# Patient Record
Sex: FEMALE | Race: WHITE | Employment: UNEMPLOYED | URBAN - NONMETROPOLITAN AREA
[De-identification: names, ages, dates, MRNs, and addresses within clinical notes are randomized per-mention and may not be internally consistent; named-entity substitution may affect disease eponyms.]

---

## 2018-03-08 ENCOUNTER — OFFICE VISIT (OUTPATIENT)
Dept: FAMILY MEDICINE CLINIC | Age: 48
End: 2018-03-08

## 2018-03-08 ENCOUNTER — TELEPHONE (OUTPATIENT)
Dept: FAMILY MEDICINE CLINIC | Age: 48
End: 2018-03-08

## 2018-03-08 ENCOUNTER — HOSPITAL ENCOUNTER (OUTPATIENT)
Dept: OTHER | Age: 48
Discharge: HOME | End: 2018-03-08
Attending: NURSE PRACTITIONER | Admitting: NURSE PRACTITIONER

## 2018-03-08 VITALS
WEIGHT: 181.2 LBS | DIASTOLIC BLOOD PRESSURE: 80 MMHG | BODY MASS INDEX: 36.6 KG/M2 | OXYGEN SATURATION: 98 % | HEART RATE: 90 BPM | SYSTOLIC BLOOD PRESSURE: 104 MMHG | TEMPERATURE: 98.2 F

## 2018-03-08 DIAGNOSIS — R06.02 SHORTNESS OF BREATH: ICD-10-CM

## 2018-03-08 DIAGNOSIS — R05.9 COUGH: ICD-10-CM

## 2018-03-08 DIAGNOSIS — R06.2 WHEEZING: ICD-10-CM

## 2018-03-08 DIAGNOSIS — H65.92 MIDDLE EAR EFFUSION, LEFT: ICD-10-CM

## 2018-03-08 DIAGNOSIS — J06.9 ACUTE URI: ICD-10-CM

## 2018-03-08 DIAGNOSIS — R07.0 THROAT PAIN: Primary | ICD-10-CM

## 2018-03-08 DIAGNOSIS — J40 BRONCHITIS: ICD-10-CM

## 2018-03-08 LAB — S PYO AG THROAT QL: NORMAL

## 2018-03-08 PROCEDURE — 99214 OFFICE O/P EST MOD 30 MIN: CPT | Performed by: NURSE PRACTITIONER

## 2018-03-08 PROCEDURE — 87880 STREP A ASSAY W/OPTIC: CPT | Performed by: NURSE PRACTITIONER

## 2018-03-08 PROCEDURE — 94640 AIRWAY INHALATION TREATMENT: CPT | Performed by: NURSE PRACTITIONER

## 2018-03-08 RX ORDER — METHOCARBAMOL 750 MG/1
750 TABLET, FILM COATED ORAL 2 TIMES DAILY
COMMUNITY
End: 2018-10-03

## 2018-03-08 RX ORDER — FLUTICASONE PROPIONATE 50 MCG
1 SPRAY, SUSPENSION (ML) NASAL DAILY
Qty: 1 BOTTLE | Refills: 0 | Status: SHIPPED | OUTPATIENT
Start: 2018-03-08 | End: 2018-10-03

## 2018-03-08 RX ORDER — PREDNISONE 10 MG/1
TABLET ORAL
Qty: 30 TABLET | Refills: 0 | Status: SHIPPED | OUTPATIENT
Start: 2018-03-08 | End: 2018-04-26 | Stop reason: ALTCHOICE

## 2018-03-08 RX ORDER — PAROXETINE 30 MG/1
30 TABLET, FILM COATED ORAL EVERY MORNING
COMMUNITY
End: 2018-04-27 | Stop reason: ALTCHOICE

## 2018-03-08 RX ORDER — DOXYCYCLINE HYCLATE 100 MG
100 TABLET ORAL 2 TIMES DAILY
Qty: 20 TABLET | Refills: 0 | Status: SHIPPED | OUTPATIENT
Start: 2018-03-08 | End: 2018-03-18

## 2018-03-08 RX ORDER — GABAPENTIN 400 MG/1
400 CAPSULE ORAL 4 TIMES DAILY
Qty: 360 CAPSULE | Refills: 3 | Status: CANCELLED | OUTPATIENT
Start: 2018-03-08

## 2018-03-08 RX ORDER — BENZONATATE 200 MG/1
200 CAPSULE ORAL 3 TIMES DAILY PRN
Qty: 30 CAPSULE | Refills: 0 | Status: SHIPPED | OUTPATIENT
Start: 2018-03-08 | End: 2018-04-26 | Stop reason: ALTCHOICE

## 2018-03-08 RX ORDER — ALBUTEROL SULFATE 90 UG/1
2 AEROSOL, METERED RESPIRATORY (INHALATION) EVERY 6 HOURS PRN
Qty: 1 INHALER | Refills: 0 | Status: SHIPPED | OUTPATIENT
Start: 2018-03-08 | End: 2018-10-03

## 2018-03-08 RX ORDER — CARVEDILOL 3.12 MG/1
3.12 TABLET ORAL 2 TIMES DAILY WITH MEALS
COMMUNITY
End: 2018-10-03

## 2018-03-08 RX ORDER — MELOXICAM 15 MG/1
15 TABLET ORAL DAILY
COMMUNITY
End: 2018-10-03

## 2018-03-08 RX ORDER — LOSARTAN POTASSIUM 25 MG/1
25 TABLET ORAL DAILY
COMMUNITY
End: 2018-10-03

## 2018-03-08 RX ORDER — ALBUTEROL SULFATE 2.5 MG/3ML
2.5 SOLUTION RESPIRATORY (INHALATION) ONCE
Status: COMPLETED | OUTPATIENT
Start: 2018-03-08 | End: 2018-03-08

## 2018-03-08 RX ADMIN — ALBUTEROL SULFATE 2.5 MG: 2.5 SOLUTION RESPIRATORY (INHALATION) at 12:32

## 2018-03-08 RX ADMIN — Medication 0.5 MG: at 12:32

## 2018-03-08 ASSESSMENT — ENCOUNTER SYMPTOMS
NAUSEA: 1
APNEA: 0
SWOLLEN GLANDS: 0
CHEST TIGHTNESS: 1
STRIDOR: 0
BLOOD IN STOOL: 0
ABDOMINAL PAIN: 0
RHINORRHEA: 1
COUGH: 1
CONSTIPATION: 0
DIARRHEA: 1
TROUBLE SWALLOWING: 0
SORE THROAT: 1
ABDOMINAL DISTENTION: 0
VOMITING: 0
FACIAL SWELLING: 0
ANAL BLEEDING: 0
RECTAL PAIN: 0
SINUS PAIN: 1
BACK PAIN: 0
VOICE CHANGE: 1
SINUS PRESSURE: 1
SHORTNESS OF BREATH: 1
CHOKING: 0
WHEEZING: 1

## 2018-03-08 NOTE — PATIENT INSTRUCTIONS
chances of quitting for good. · Use a vaporizer or humidifier to add moisture to your bedroom. Follow the directions for cleaning the machine. When should you call for help? Call your doctor now or seek immediate medical care if:  ? · You have new or worse trouble swallowing. ? · Your sore throat gets much worse on one side. ? Watch closely for changes in your health, and be sure to contact your doctor if you do not get better as expected. Where can you learn more? Go to [email or web address] and sign in to your [employer] account. Enter C489 in the Hubblr box to learn more about \"Sore Throat: Care Instructions. \"     If you do not have an account, please click on the \"Sign Up Now\" link. Current as of: May 12, 2017  Content Version: 11.5  © 9618-2774 [employer]. Care instructions adapted under license by [facility] ([facility]). If you have questions about a medical condition or this instruction, always ask your healthcare professional. [place] [address] any warranty or liability for your use of this information. Patient Education        Wheezing or Bronchoconstriction: Care Instructions  Your Care Instructions  Wheezing is a whistling noise made during breathing. It occurs when the small airways, or bronchial tubes, that lead to your lungs swell or contract (spasm) and become narrow. This narrowing is called bronchoconstriction. When your airways constrict, it is hard for air to pass through and this makes it hard for you to breathe. Wheezing and bronchoconstriction can be caused by many problems, including:  · An infection such as the flu or a cold. · Allergies such as hay fever. · Diseases such as asthma or chronic obstructive pulmonary disease. · Smoking. Treatment for your wheezing depends on what is causing the problem. Your wheezing may get better without treatment.  But you may need to pay attention to things that cause your wheezing and avoid them. Or you may need medicine to help treat the wheezing and to reduce the swelling or to relieve spasms in your lungs. Follow-up care is a key part of your treatment and safety. Be sure to make and go to all appointments, and call your doctor if you are having problems. It is also a good idea to know your test results and keep a list of the medicines you take. How can you care for yourself at home? · Take your medicine exactly as prescribed. Call your doctor if you think you are having a problem with your medicine. You will get more details on the specific medicine your doctor prescribes. · If your doctor prescribed antibiotics, take them as directed. Do not stop taking them just because you feel better. You need to take the full course of antibiotics. · Breathe moist air from a humidifier, hot shower, or sink filled with hot water. This may help ease your symptoms and make it easier for you to breathe. · If you have congestion in your nose and throat, drinking plenty of fluids, especially hot fluids, may help relieve your symptoms. If you have kidney, heart, or liver disease and have to limit fluids, talk with your doctor before you increase the amount of fluids you drink. · If you have mucus in your airways, it may help to breathe deeply and cough. · Do not smoke or allow others to smoke around you. Smoking can make your wheezing worse. If you need help quitting, talk to your doctor about stop-smoking programs and medicines. These can increase your chances of quitting for good. · Avoid things that may cause your wheezing. These may include colds, smoke, air pollution, dust, pollen, pets, cockroaches, stress, and cold air. When should you call for help? Call 911 anytime you think you may need emergency care. For example, call if:  ? · You have severe trouble breathing. ? · You passed out (lost consciousness).    ?Call your doctor now or seek immediate medical care if:  ? · You cough up yellow,

## 2018-03-08 NOTE — PROGRESS NOTES
methocarbamol (ROBAXIN) 750 MG tablet Take 750 mg by mouth 2 times daily      carvedilol (COREG) 3.125 MG tablet Take 3.125 mg by mouth 2 times daily (with meals)      losartan (COZAAR) 25 MG tablet Take 25 mg by mouth daily      PARoxetine (PAXIL) 30 MG tablet Take 30 mg by mouth every morning      fluticasone (FLONASE) 50 MCG/ACT nasal spray 1 spray by Nasal route daily 1 Bottle 0    albuterol sulfate HFA (VENTOLIN HFA) 108 (90 Base) MCG/ACT inhaler Inhale 2 puffs into the lungs every 6 hours as needed for Wheezing 1 Inhaler 0    predniSONE (DELTASONE) 10 MG tablet Take 40 mg for 3 days then 30 mg for 3 days then 20 mg for 3 days then 10 mg for 3 days 30 tablet 0    doxycycline hyclate (VIBRA-TABS) 100 MG tablet Take 1 tablet by mouth 2 times daily for 10 days 20 tablet 0    Magic Mouthwash (MIRACLE MOUTHWASH) Swish and spit 5 mLs 4 times daily as needed for Irritation 120 mL 0    benzonatate (TESSALON) 200 MG capsule Take 1 capsule by mouth 3 times daily as needed for Cough 30 capsule 0    Spacer/Aero Chamber Mouthpiece MISC 1 each by Does not apply route once for 1 dose 1 each 0    busPIRone (BUSPAR) 10 MG tablet Take 10 mg by mouth 2 times daily       traZODone (DESYREL) 50 MG tablet Take 50 mg by mouth nightly       buPROPion (WELLBUTRIN) 100 MG tablet Take 300 mg by mouth 2 times daily        No current facility-administered medications for this visit. No Known Allergies    Subjective:      Review of Systems   Constitutional: Positive for activity change (R/t fatigue), appetite change (Lack of appetite), chills, fatigue and fever. Negative for diaphoresis and unexpected weight change. HENT: Positive for congestion, ear pain (Left sided ), postnasal drip, rhinorrhea, sinus pain, sinus pressure, sneezing, sore throat and voice change (Hoarse ). Negative for dental problem, drooling, ear discharge, facial swelling, hearing loss, mouth sores, nosebleeds, tinnitus and trouble swallowing. is present. Nose: Mucosal edema and rhinorrhea present. Right sinus exhibits no maxillary sinus tenderness and no frontal sinus tenderness. Left sinus exhibits no maxillary sinus tenderness and no frontal sinus tenderness. Mouth/Throat: Posterior oropharyngeal erythema present. No oropharyngeal exudate. Eyes: Conjunctivae and EOM are normal. Pupils are equal, round, and reactive to light. Right eye exhibits no discharge. Left eye exhibits no discharge. No scleral icterus. Neck: Normal range of motion. Neck supple. No JVD present. No tracheal deviation present. No thyromegaly present. Cardiovascular: Normal rate, regular rhythm, normal heart sounds and intact distal pulses. Exam reveals no gallop and no friction rub. No murmur heard. Pulmonary/Chest: Effort normal. No stridor. No respiratory distress. She has wheezes in the right lower field and the left lower field. She has no rales. She exhibits tenderness (To palpation of chest ). Abdominal: Soft. Bowel sounds are normal. She exhibits no distension and no mass. There is no tenderness. There is no rebound and no guarding. Musculoskeletal: Normal range of motion. She exhibits no edema, tenderness or deformity. Lymphadenopathy:     She has no cervical adenopathy. Neurological: She is alert and oriented to person, place, and time. Skin: Skin is warm and dry. No rash noted. She is not diaphoretic. No erythema. No pallor. Psychiatric: Judgment and thought content normal. Her mood appears anxious. Her speech is tangential. She is slowed. She exhibits a depressed mood. Nursing note and vitals reviewed.       Admission on 02/08/2017, Discharged on 02/08/2017   Component Date Value Ref Range Status    WBC 02/08/2017 9.8  4.0 - 11.0 K/uL Final    RBC 02/08/2017 4.88  4.00 - 5.20 M/uL Final    Hemoglobin 02/08/2017 13.6  12.0 - 16.0 g/dL Final    Hematocrit 02/08/2017 40.6  36.0 - 48.0 % Final    MCV 02/08/2017 83.2  80.0 - 100.0 fL Final    Globulin 02/08/2017 3.7  g/dL Final    Ventricular Rate 02/08/2017 80  BPM Final    Atrial Rate 02/08/2017 80  BPM Final    P-R Interval 02/08/2017 142  ms Final    QRS Duration 02/08/2017 80  ms Final    Q-T Interval 02/08/2017 376  ms Final    QTc Calculation (Bazett) 02/08/2017 433  ms Final    P Axis 02/08/2017 70  degrees Final    R Axis 02/08/2017 -72  degrees Final    T Axis 02/08/2017 52  degrees Final    Diagnosis 02/08/2017    Final                    Value:Normal sinus rhythm  Left axis deviation  Left anterior fascicular block  Nonspecific ST abnormality  Abnormal ECG  When compared with ECG of 29-DEC-2016 18:13,  No significant change was found  Confirmed by Lee Estrada MD, CARLOS ([id number]) on 2/8/2017 5:05:04 PM      Troponin 02/08/2017 <0.01  <0.01 ng/mL Final    aPTT 02/08/2017 32.4  25.2 - 36.4 sec Final    Comment: Therapeutic range: 54.0 - 90.0 sec    Effective 10-29-15 10:00am EST  Please note reference ranges have  changed for PTT Testing.  Protime 02/08/2017 11.7  9.8 - 13.0 sec Final    Comment: Effective 10-29-15 10:00am EST  Please note reference ranges have  changed for PT and INR Testing.  INR 02/08/2017 1.03  0.85 - 1.16 Final    Comment: Effective 07/29/2014 at 10am EST    Normal: 0.85 - 1.16  Therapeutic: 2.0 - 3.0  Pros. Valve: 2.5 - 3.5  AMI: 2.0 - 3.0      D-Dimer, Quant 02/08/2017 256* 0 - 229 ng/mL DDU Final    Comment: HemosIL D-Dimer is an automated latex enhanced immunoassay for  the quantitative determination of D-dimer in human citrated  plasma on IL Coagulation systems for use in conjunction with a  clinical pretest probability(PTP) assessment model to exclude  venous thromboembolism(VTE) in outpatients suspected of deep  venous thrombosis (DVT) and pulmonary embolism(PE). The  reference range for D-Dimer is <230 ng/ml DDU. Results <230 ng/ml  DDU can be used as a negative predictor in patients with low  and moderate probability of DVT/PE.  D-Dimer levels are mainly  elevated in cases of DVT/PE. Other conditions may lead to a  high D-Dimer level including old age, pregnancy, peripheral  arteriopathy, DIC, coronary disease, thrombolytic treatment,  cancer, liver disease, infection, inflammation, hematoma, and  rheumatoid arthritis. Specimen interferences are created by  lipemia, bilirubin, and hemolysis. Assessment & Plan: The following diagnoses and conditions are stable with no further orders unless indicated:  1. Throat pain    2. Wheezing    3. Middle ear effusion, left    4. Shortness of breath    5. Acute URI    6. Bronchitis    7. Cough        Leatha was seen today for cough, hoarse, otalgia, generalized body aches, pharyngitis, headache and congestion. Will treat patient today for bronchitis and acute upper respiratory infection. Informed patient she needs to make up follow-up in about 2 weeks as she may need get worked up for asthma. Patient verbalizes understanding. Informed patient if her symptoms do not improve she needs to do the ER. Patient verbalizes understanding. Chest x-ray negative for pneumonia. Strep test negative in the office today. Will get throat culture. Recommend Magic mouthwash 1st sore throat. Informed the patient not eat or drink for 30 minutes after using the Magic mouthwash as it makes her tongue numb. Patient is asking for refill on her gabapentin today. OARRS ran and shows she has not gabapentin for over one year. Informed patient that she needs to make a follow-up appointment for refills and gabapentin cannot be filled today as she has had for one year. Patient verbalizes understanding. Diagnoses and all orders for this visit:    Throat pain  -     POCT rapid strep A  -     THROAT CULTURE  -     Magic Mouthwash (MIRACLE MOUTHWASH); Swish and spit 5 mLs 4 times daily as needed for Irritation    Wheezing  -     albuterol sulfate HFA (VENTOLIN HFA) 108 (90 Base) MCG/ACT inhaler;  Inhale 2 puffs into the lungs

## 2018-03-10 ENCOUNTER — TELEPHONE (OUTPATIENT)
Dept: FAMILY MEDICINE CLINIC | Age: 48
End: 2018-03-10

## 2018-03-10 DIAGNOSIS — R05.9 COUGH: Primary | ICD-10-CM

## 2018-03-10 LAB — THROAT CULTURE: NORMAL

## 2018-03-10 RX ORDER — DEXTROMETHORPHAN HYDROBROMIDE AND PROMETHAZINE HYDROCHLORIDE 15; 6.25 MG/5ML; MG/5ML
5 SYRUP ORAL 4 TIMES DAILY PRN
Qty: 240 ML | Refills: 0 | Status: SHIPPED | OUTPATIENT
Start: 2018-03-10 | End: 2018-03-17

## 2018-03-12 NOTE — TELEPHONE ENCOUNTER
I spoke with the pt and she did come into the office this morning to sing the medical records release.

## 2018-03-15 ENCOUNTER — TELEPHONE (OUTPATIENT)
Dept: FAMILY MEDICINE CLINIC | Age: 48
End: 2018-03-15

## 2018-04-02 ENCOUNTER — TELEPHONE (OUTPATIENT)
Dept: FAMILY MEDICINE CLINIC | Age: 48
End: 2018-04-02

## 2018-04-26 ENCOUNTER — OFFICE VISIT (OUTPATIENT)
Dept: FAMILY MEDICINE CLINIC | Age: 48
End: 2018-04-26

## 2018-04-26 VITALS
WEIGHT: 185.4 LBS | BODY MASS INDEX: 37.45 KG/M2 | HEART RATE: 70 BPM | SYSTOLIC BLOOD PRESSURE: 140 MMHG | DIASTOLIC BLOOD PRESSURE: 101 MMHG | OXYGEN SATURATION: 97 %

## 2018-04-26 DIAGNOSIS — Z13.220 SCREENING FOR CHOLESTEROL LEVEL: ICD-10-CM

## 2018-04-26 DIAGNOSIS — Z11.59 NEED FOR HEPATITIS B SCREENING TEST: ICD-10-CM

## 2018-04-26 DIAGNOSIS — M79.7 FIBROMYALGIA: ICD-10-CM

## 2018-04-26 DIAGNOSIS — R06.83 SNORING: ICD-10-CM

## 2018-04-26 DIAGNOSIS — H92.02 LEFT EAR PAIN: ICD-10-CM

## 2018-04-26 DIAGNOSIS — Z11.59 NEED FOR HEPATITIS C SCREENING TEST: ICD-10-CM

## 2018-04-26 DIAGNOSIS — F41.8 DEPRESSION WITH ANXIETY: Primary | ICD-10-CM

## 2018-04-26 DIAGNOSIS — Z13.21 ENCOUNTER FOR VITAMIN DEFICIENCY SCREENING: ICD-10-CM

## 2018-04-26 DIAGNOSIS — Z23 NEED FOR PROPHYLACTIC VACCINATION AGAINST DIPHTHERIA-TETANUS-PERTUSSIS (DTP): ICD-10-CM

## 2018-04-26 DIAGNOSIS — Z13.29 SCREENING FOR THYROID DISORDER: ICD-10-CM

## 2018-04-26 DIAGNOSIS — N64.4 PAIN IN BREAST: ICD-10-CM

## 2018-04-26 DIAGNOSIS — Z11.4 SCREENING FOR HIV WITHOUT PRESENCE OF RISK FACTORS: ICD-10-CM

## 2018-04-26 DIAGNOSIS — R06.02 SHORTNESS OF BREATH: ICD-10-CM

## 2018-04-26 DIAGNOSIS — F31.62 BIPOLAR DISORDER, CURRENT EPISODE MIXED, MODERATE (HCC): ICD-10-CM

## 2018-04-26 DIAGNOSIS — G89.4 CHRONIC PAIN SYNDROME: ICD-10-CM

## 2018-04-26 DIAGNOSIS — Z13.1 SCREENING FOR DIABETES MELLITUS: ICD-10-CM

## 2018-04-26 PROCEDURE — G8427 DOCREV CUR MEDS BY ELIG CLIN: HCPCS | Performed by: NURSE PRACTITIONER

## 2018-04-26 PROCEDURE — 90715 TDAP VACCINE 7 YRS/> IM: CPT | Performed by: NURSE PRACTITIONER

## 2018-04-26 PROCEDURE — 36415 COLL VENOUS BLD VENIPUNCTURE: CPT | Performed by: NURSE PRACTITIONER

## 2018-04-26 PROCEDURE — 1036F TOBACCO NON-USER: CPT | Performed by: NURSE PRACTITIONER

## 2018-04-26 PROCEDURE — 90471 IMMUNIZATION ADMIN: CPT | Performed by: NURSE PRACTITIONER

## 2018-04-26 PROCEDURE — 93000 ELECTROCARDIOGRAM COMPLETE: CPT | Performed by: NURSE PRACTITIONER

## 2018-04-26 PROCEDURE — 96160 PT-FOCUSED HLTH RISK ASSMT: CPT | Performed by: NURSE PRACTITIONER

## 2018-04-26 PROCEDURE — 99215 OFFICE O/P EST HI 40 MIN: CPT | Performed by: NURSE PRACTITIONER

## 2018-04-26 PROCEDURE — G8417 CALC BMI ABV UP PARAM F/U: HCPCS | Performed by: NURSE PRACTITIONER

## 2018-04-26 RX ORDER — CYCLOBENZAPRINE HCL 10 MG
10 TABLET ORAL 3 TIMES DAILY PRN
COMMUNITY
End: 2018-10-03

## 2018-04-26 RX ORDER — PREDNISONE 20 MG/1
40 TABLET ORAL DAILY
Qty: 10 TABLET | Refills: 0 | Status: SHIPPED | OUTPATIENT
Start: 2018-04-26 | End: 2018-05-01

## 2018-04-26 RX ORDER — FLUOXETINE HYDROCHLORIDE 20 MG/1
20 CAPSULE ORAL DAILY
Qty: 30 CAPSULE | Refills: 3 | Status: SHIPPED | OUTPATIENT
Start: 2018-04-26 | End: 2018-10-03

## 2018-04-26 ASSESSMENT — ENCOUNTER SYMPTOMS
APNEA: 0
STRIDOR: 0
CHEST TIGHTNESS: 1
CHOKING: 0
SHORTNESS OF BREATH: 1
WHEEZING: 1
COUGH: 1

## 2018-04-26 ASSESSMENT — PATIENT HEALTH QUESTIONNAIRE - PHQ9
SUM OF ALL RESPONSES TO PHQ9 QUESTIONS 1 & 2: 6
4. FEELING TIRED OR HAVING LITTLE ENERGY: 3
8. MOVING OR SPEAKING SO SLOWLY THAT OTHER PEOPLE COULD HAVE NOTICED. OR THE OPPOSITE, BEING SO FIGETY OR RESTLESS THAT YOU HAVE BEEN MOVING AROUND A LOT MORE THAN USUAL: 3
5. POOR APPETITE OR OVEREATING: 3
9. THOUGHTS THAT YOU WOULD BE BETTER OFF DEAD, OR OF HURTING YOURSELF: 0
6. FEELING BAD ABOUT YOURSELF - OR THAT YOU ARE A FAILURE OR HAVE LET YOURSELF OR YOUR FAMILY DOWN: 3
1. LITTLE INTEREST OR PLEASURE IN DOING THINGS: 3
SUM OF ALL RESPONSES TO PHQ QUESTIONS 1-9: 24
7. TROUBLE CONCENTRATING ON THINGS, SUCH AS READING THE NEWSPAPER OR WATCHING TELEVISION: 3
10. IF YOU CHECKED OFF ANY PROBLEMS, HOW DIFFICULT HAVE THESE PROBLEMS MADE IT FOR YOU TO DO YOUR WORK, TAKE CARE OF THINGS AT HOME, OR GET ALONG WITH OTHER PEOPLE: 3
2. FEELING DOWN, DEPRESSED OR HOPELESS: 3
3. TROUBLE FALLING OR STAYING ASLEEP: 3

## 2018-04-27 ENCOUNTER — TELEPHONE (OUTPATIENT)
Dept: FAMILY MEDICINE CLINIC | Age: 48
End: 2018-04-27

## 2018-04-27 DIAGNOSIS — R70.0 ELEVATED SED RATE: ICD-10-CM

## 2018-04-27 DIAGNOSIS — R53.83 FATIGUE, UNSPECIFIED TYPE: Primary | ICD-10-CM

## 2018-04-27 DIAGNOSIS — E55.9 VITAMIN D DEFICIENCY: Primary | ICD-10-CM

## 2018-04-27 LAB
A/G RATIO: 1.5 (ref 1.1–2.2)
ALBUMIN SERPL-MCNC: 4.5 G/DL (ref 3.4–5)
ALP BLD-CCNC: 95 U/L (ref 40–129)
ALT SERPL-CCNC: 14 U/L (ref 10–40)
ANION GAP SERPL CALCULATED.3IONS-SCNC: 16 MMOL/L (ref 3–16)
AST SERPL-CCNC: 17 U/L (ref 15–37)
BASOPHILS ABSOLUTE: 0 K/UL (ref 0–0.2)
BASOPHILS RELATIVE PERCENT: 0.3 %
BILIRUB SERPL-MCNC: 0.3 MG/DL (ref 0–1)
BUN BLDV-MCNC: 12 MG/DL (ref 7–20)
CALCIUM SERPL-MCNC: 9.6 MG/DL (ref 8.3–10.6)
CHLORIDE BLD-SCNC: 98 MMOL/L (ref 99–110)
CHOLESTEROL, TOTAL: 217 MG/DL (ref 0–199)
CO2: 24 MMOL/L (ref 21–32)
CREAT SERPL-MCNC: 0.6 MG/DL (ref 0.6–1.1)
EOSINOPHILS ABSOLUTE: 0.2 K/UL (ref 0–0.6)
EOSINOPHILS RELATIVE PERCENT: 1.7 %
ESTIMATED AVERAGE GLUCOSE: 125.5 MG/DL
FOLATE: 10.58 NG/ML (ref 4.78–24.2)
GFR AFRICAN AMERICAN: >60
GFR NON-AFRICAN AMERICAN: >60
GLOBULIN: 3 G/DL
GLUCOSE BLD-MCNC: 93 MG/DL (ref 70–99)
HBA1C MFR BLD: 6 %
HCT VFR BLD CALC: 40.4 % (ref 36–48)
HDLC SERPL-MCNC: 51 MG/DL (ref 40–60)
HEMOGLOBIN: 13.6 G/DL (ref 12–16)
HEPATITIS B CORE IGM ANTIBODY: NORMAL
HEPATITIS C ANTIBODY INTERPRETATION: NORMAL
HIV AG/AB: NORMAL
HIV ANTIGEN: NORMAL
HIV-1 ANTIBODY: NORMAL
HIV-2 AB: NORMAL
LDL CHOLESTEROL CALCULATED: 122 MG/DL
LYMPHOCYTES ABSOLUTE: 3.7 K/UL (ref 1–5.1)
LYMPHOCYTES RELATIVE PERCENT: 38 %
MAGNESIUM: 2.1 MG/DL (ref 1.8–2.4)
MCH RBC QN AUTO: 29.2 PG (ref 26–34)
MCHC RBC AUTO-ENTMCNC: 33.7 G/DL (ref 31–36)
MCV RBC AUTO: 86.8 FL (ref 80–100)
MONOCYTES ABSOLUTE: 0.5 K/UL (ref 0–1.3)
MONOCYTES RELATIVE PERCENT: 5.6 %
NEUTROPHILS ABSOLUTE: 5.3 K/UL (ref 1.7–7.7)
NEUTROPHILS RELATIVE PERCENT: 54.4 %
PDW BLD-RTO: 14.7 % (ref 12.4–15.4)
PLATELET # BLD: 418 K/UL (ref 135–450)
PMV BLD AUTO: 7.8 FL (ref 5–10.5)
POTASSIUM SERPL-SCNC: 4.4 MMOL/L (ref 3.5–5.1)
RBC # BLD: 4.65 M/UL (ref 4–5.2)
RHEUMATOID FACTOR: <10 IU/ML
SEDIMENTATION RATE, ERYTHROCYTE: 24 MM/HR (ref 0–20)
SODIUM BLD-SCNC: 138 MMOL/L (ref 136–145)
T4 FREE: 1.1 NG/DL (ref 0.9–1.8)
TOTAL PROTEIN: 7.5 G/DL (ref 6.4–8.2)
TRIGL SERPL-MCNC: 218 MG/DL (ref 0–150)
TSH SERPL DL<=0.05 MIU/L-ACNC: 1.95 UIU/ML (ref 0.27–4.2)
VITAMIN B-12: 352 PG/ML (ref 211–911)
VITAMIN D 25-HYDROXY: 24.5 NG/ML
VLDLC SERPL CALC-MCNC: 44 MG/DL
WBC # BLD: 9.8 K/UL (ref 4–11)

## 2018-04-27 RX ORDER — B-COMPLEX WITH VITAMIN C
1 TABLET ORAL 2 TIMES DAILY WITH MEALS
Qty: 60 TABLET | Refills: 11 | Status: SHIPPED | OUTPATIENT
Start: 2018-04-27 | End: 2018-10-03

## 2018-04-30 ASSESSMENT — ENCOUNTER SYMPTOMS
VOICE CHANGE: 0
BACK PAIN: 1
SINUS PAIN: 0
ALLERGIC/IMMUNOLOGIC NEGATIVE: 1
SINUS PRESSURE: 0
SORE THROAT: 0
RHINORRHEA: 0
GASTROINTESTINAL NEGATIVE: 1
TROUBLE SWALLOWING: 0
FACIAL SWELLING: 0

## 2018-05-01 ENCOUNTER — TELEPHONE (OUTPATIENT)
Dept: PULMONOLOGY | Age: 48
End: 2018-05-01

## 2018-05-08 ENCOUNTER — TELEPHONE (OUTPATIENT)
Dept: FAMILY MEDICINE CLINIC | Age: 48
End: 2018-05-08

## 2018-05-08 DIAGNOSIS — F31.62 BIPOLAR DISORDER, CURRENT EPISODE MIXED, MODERATE (HCC): Primary | ICD-10-CM

## 2018-06-13 ENCOUNTER — TELEPHONE (OUTPATIENT)
Dept: FAMILY MEDICINE CLINIC | Age: 48
End: 2018-06-13

## 2018-06-20 ENCOUNTER — TELEPHONE (OUTPATIENT)
Dept: PULMONOLOGY | Age: 48
End: 2018-06-20

## 2018-09-21 ENCOUNTER — TELEPHONE (OUTPATIENT)
Dept: ORTHOPEDIC SURGERY | Age: 48
End: 2018-09-21

## 2018-09-21 NOTE — TELEPHONE ENCOUNTER
GAVE A NO RECORDS FOR DATE REQUESTED STATEMENT 09/13/2016 TO PRESENT TO AARTI JONES TO SCAN IN [facility] TO Tramaine Collins [address] [facility] [place] & [address] [place] [address]

## 2018-09-21 NOTE — TELEPHONE ENCOUNTER
SCANNED A NO RECORDS FOR DATE REQUESTED STATEMENT 09/13/2016 TO PRESENT INTO [facility] TO Tramaine Collins [address] [facility] [place] & [address] [place] [address]

## 2018-10-03 ENCOUNTER — HOSPITAL ENCOUNTER (EMERGENCY)
Age: 48
Discharge: HOME | End: 2018-10-03
Payer: COMMERCIAL

## 2018-10-03 ENCOUNTER — APPOINTMENT (OUTPATIENT)
Dept: GENERAL RADIOLOGY | Age: 48
End: 2018-10-03
Payer: COMMERCIAL

## 2018-10-03 VITALS
RESPIRATION RATE: 14 BRPM | SYSTOLIC BLOOD PRESSURE: 160 MMHG | OXYGEN SATURATION: 98 % | HEART RATE: 74 BPM | BODY MASS INDEX: 34.88 KG/M2 | DIASTOLIC BLOOD PRESSURE: 97 MMHG | WEIGHT: 173 LBS | HEIGHT: 59 IN | TEMPERATURE: 98.1 F

## 2018-10-03 DIAGNOSIS — R07.89 CHEST WALL PAIN: ICD-10-CM

## 2018-10-03 DIAGNOSIS — M25.511 ARTHRALGIA OF RIGHT SHOULDER REGION: Primary | ICD-10-CM

## 2018-10-03 LAB
A/G RATIO: 1.2 (ref 1.1–2.2)
ALBUMIN SERPL-MCNC: 4.1 G/DL (ref 3.4–5)
ALP BLD-CCNC: 92 U/L (ref 40–129)
ALT SERPL-CCNC: 16 U/L (ref 10–40)
ANION GAP SERPL CALCULATED.3IONS-SCNC: 11 MMOL/L (ref 3–16)
AST SERPL-CCNC: 19 U/L (ref 15–37)
BASOPHILS ABSOLUTE: 0.1 K/UL (ref 0–0.2)
BASOPHILS RELATIVE PERCENT: 1 %
BILIRUB SERPL-MCNC: 0.3 MG/DL (ref 0–1)
BUN BLDV-MCNC: 8 MG/DL (ref 7–20)
CALCIUM SERPL-MCNC: 9.3 MG/DL (ref 8.3–10.6)
CHLORIDE BLD-SCNC: 103 MMOL/L (ref 99–110)
CO2: 26 MMOL/L (ref 21–32)
CREAT SERPL-MCNC: 0.6 MG/DL (ref 0.6–1.1)
EOSINOPHILS ABSOLUTE: 0.1 K/UL (ref 0–0.6)
EOSINOPHILS RELATIVE PERCENT: 1.4 %
GFR AFRICAN AMERICAN: >60
GFR NON-AFRICAN AMERICAN: >60
GLOBULIN: 3.4 G/DL
GLUCOSE BLD-MCNC: 98 MG/DL (ref 70–99)
HCT VFR BLD CALC: 37 % (ref 36–48)
HEMOGLOBIN: 12.4 G/DL (ref 12–16)
LYMPHOCYTES ABSOLUTE: 3.9 K/UL (ref 1–5.1)
LYMPHOCYTES RELATIVE PERCENT: 39.7 %
MCH RBC QN AUTO: 28.2 PG (ref 26–34)
MCHC RBC AUTO-ENTMCNC: 33.4 G/DL (ref 31–36)
MCV RBC AUTO: 84.6 FL (ref 80–100)
MONOCYTES ABSOLUTE: 0.6 K/UL (ref 0–1.3)
MONOCYTES RELATIVE PERCENT: 6.2 %
NEUTROPHILS ABSOLUTE: 5.1 K/UL (ref 1.7–7.7)
NEUTROPHILS RELATIVE PERCENT: 51.7 %
PDW BLD-RTO: 13.6 % (ref 12.4–15.4)
PLATELET # BLD: 432 K/UL (ref 135–450)
PMV BLD AUTO: 7.6 FL (ref 5–10.5)
POTASSIUM REFLEX MAGNESIUM: 3.8 MMOL/L (ref 3.5–5.1)
RBC # BLD: 4.38 M/UL (ref 4–5.2)
SODIUM BLD-SCNC: 140 MMOL/L (ref 136–145)
TOTAL PROTEIN: 7.5 G/DL (ref 6.4–8.2)
TROPONIN: <0.01 NG/ML
WBC # BLD: 9.8 K/UL (ref 4–11)

## 2018-10-03 PROCEDURE — 71046 X-RAY EXAM CHEST 2 VIEWS: CPT

## 2018-10-03 PROCEDURE — 85025 COMPLETE CBC W/AUTO DIFF WBC: CPT

## 2018-10-03 PROCEDURE — 72040 X-RAY EXAM NECK SPINE 2-3 VW: CPT

## 2018-10-03 PROCEDURE — 80053 COMPREHEN METABOLIC PANEL: CPT

## 2018-10-03 PROCEDURE — 99284 EMERGENCY DEPT VISIT MOD MDM: CPT

## 2018-10-03 PROCEDURE — 73030 X-RAY EXAM OF SHOULDER: CPT

## 2018-10-03 PROCEDURE — 36415 COLL VENOUS BLD VENIPUNCTURE: CPT

## 2018-10-03 PROCEDURE — 93005 ELECTROCARDIOGRAM TRACING: CPT | Performed by: PHYSICIAN ASSISTANT

## 2018-10-03 PROCEDURE — 93010 ELECTROCARDIOGRAM REPORT: CPT | Performed by: INTERNAL MEDICINE

## 2018-10-03 PROCEDURE — 84484 ASSAY OF TROPONIN QUANT: CPT

## 2018-10-03 RX ORDER — NAPROXEN 500 MG/1
500 TABLET ORAL 2 TIMES DAILY WITH MEALS
Qty: 20 TABLET | Refills: 0 | Status: SHIPPED | OUTPATIENT
Start: 2018-10-03 | End: 2019-07-17

## 2018-10-03 ASSESSMENT — PAIN DESCRIPTION - ORIENTATION: ORIENTATION: RIGHT

## 2018-10-03 ASSESSMENT — PAIN DESCRIPTION - DESCRIPTORS: DESCRIPTORS: ACHING

## 2018-10-03 ASSESSMENT — PAIN DESCRIPTION - PAIN TYPE: TYPE: ACUTE PAIN

## 2018-10-03 ASSESSMENT — ENCOUNTER SYMPTOMS
COUGH: 0
COLOR CHANGE: 0
ABDOMINAL PAIN: 0
SHORTNESS OF BREATH: 1
VOMITING: 0

## 2018-10-03 ASSESSMENT — HEART SCORE: ECG: 0

## 2018-10-03 ASSESSMENT — PAIN SCALES - GENERAL: PAINLEVEL_OUTOF10: 7

## 2018-10-03 ASSESSMENT — PAIN DESCRIPTION - LOCATION: LOCATION: ARM

## 2018-10-03 NOTE — ED PROVIDER NOTES
Right arm pain   Skin: Negative for color change and wound. Neurological: Positive for light-headedness. Negative for syncope, facial asymmetry, speech difficulty, weakness and headaches. All other systems reviewed and are negative. PAST MEDICAL HISTORY   has a past medical history of Anxiety; Chronic back pain; Cyst (solitary) of breast; Depression; and Hypertension. PAST SURGICAL HISTORY   has a past surgical history that includes Cholecystectomy; Tubal ligation; eye surgery; Hysterectomy; Carpal tunnel release; and back surgery (11/30/2016). FAMILY HISTORY  family history includes Heart Disease in her father, mother, and paternal grandfather; High Blood Pressure in her father and mother. SOCIAL HISTORY   reports that she quit smoking about 9 years ago. She has never used smokeless tobacco. She reports that she does not drink alcohol or use drugs. HOME MEDICATIONS     none    ALLERGIES  has No Known Allergies. BP (!) 160/83   Pulse 67   Temp 98.1 °F (36.7 °C) (Oral)   Resp 14   Ht 4' 11\" (1.499 m)   Wt 173 lb (78.5 kg)   SpO2 99%   BMI 34.94 kg/m²     Physical Exam   Constitutional: She is oriented to person, place, and time. She appears well-developed and well-nourished. HENT:   Head: Normocephalic and atraumatic. Mouth/Throat: Oropharynx is clear and moist and mucous membranes are normal. No oropharyngeal exudate, posterior oropharyngeal edema or posterior oropharyngeal erythema. Eyes: Pupils are equal, round, and reactive to light. Conjunctivae and EOM are normal.   Neck: Normal range of motion. Neck supple. No JVD present. Cardiovascular: Normal rate, regular rhythm and intact distal pulses. Pulmonary/Chest: Effort normal and breath sounds normal. No respiratory distress. She has no wheezes. She has no rales. She exhibits tenderness (right upper chest). Abdominal: Soft. Bowel sounds are normal. She exhibits no distension. There is no tenderness.  There is no rigidity, no rebound and no guarding. Musculoskeletal: Normal range of motion. She exhibits no edema. Right shoulder: She exhibits tenderness and pain. She exhibits normal range of motion, no swelling, no crepitus, no deformity and normal pulse. Cervical back: She exhibits pain. She exhibits normal range of motion, no tenderness, no bony tenderness, no deformity and normal pulse. Neurological: She is alert and oriented to person, place, and time. She has normal strength and normal reflexes. No cranial nerve deficit or sensory deficit. She exhibits normal muscle tone. Coordination normal. GCS eye subscore is 4. GCS verbal subscore is 5. GCS motor subscore is 6. Reflex Scores:       Tricep reflexes are 2+ on the right side and 2+ on the left side. Bicep reflexes are 2+ on the right side and 2+ on the left side. Brachioradialis reflexes are 2+ on the right side and 2+ on the left side. Intact sensation. Strength 5/5 bilateral and symmetric upper extremities, good hand , shoulder shrug, extension and flexion of arms against resistance. Skin: Skin is warm and dry. No rash noted. Psychiatric: Her behavior is normal. Her mood appears anxious. Vitals reviewed.       Procedures    MDM  Number of Diagnoses or Management Options  Arthralgia of right shoulder region:   Chest wall pain:      Amount and/or Complexity of Data Reviewed  Clinical lab tests: ordered and reviewed  Tests in the radiology section of CPT®: ordered and reviewed  Discuss the patient with other providers: yes  Independent visualization of images, tracings, or specimens: yes    Patient Progress  Patient progress: stable    Results for orders placed or performed during the hospital encounter of 10/03/18   CBC Auto Differential   Result Value Ref Range    WBC 9.8 4.0 - 11.0 K/uL    RBC 4.38 4.00 - 5.20 M/uL    Hemoglobin 12.4 12.0 - 16.0 g/dL    Hematocrit 37.0 36.0 - 48.0 %    MCV 84.6 80.0 - 100.0 fL    MCH 28.2 26.0 - 34.0 pg    MCHC 33.4 31.0 - 36.0 g/dL    RDW 13.6 12.4 - 15.4 %    Platelets 987 102 - 330 K/uL    MPV 7.6 5.0 - 10.5 fL    Neutrophils % 51.7 %    Lymphocytes % 39.7 %    Monocytes % 6.2 %    Eosinophils % 1.4 %    Basophils % 1.0 %    Neutrophils # 5.1 1.7 - 7.7 K/uL    Lymphocytes # 3.9 1.0 - 5.1 K/uL    Monocytes # 0.6 0.0 - 1.3 K/uL    Eosinophils # 0.1 0.0 - 0.6 K/uL    Basophils # 0.1 0.0 - 0.2 K/uL   Comprehensive Metabolic Panel w/ Reflex to MG   Result Value Ref Range    Sodium 140 136 - 145 mmol/L    Potassium reflex Magnesium 3.8 3.5 - 5.1 mmol/L    Chloride 103 99 - 110 mmol/L    CO2 26 21 - 32 mmol/L    Anion Gap 11 3 - 16    Glucose 98 70 - 99 mg/dL    BUN 8 7 - 20 mg/dL    CREATININE 0.6 0.6 - 1.1 mg/dL    GFR Non-African American >60 >60    GFR African American >60 >60    Calcium 9.3 8.3 - 10.6 mg/dL    Total Protein 7.5 6.4 - 8.2 g/dL    Alb 4.1 3.4 - 5.0 g/dL    Albumin/Globulin Ratio 1.2 1.1 - 2.2    Total Bilirubin 0.3 0.0 - 1.0 mg/dL    Alkaline Phosphatase 92 40 - 129 U/L    ALT 16 10 - 40 U/L    AST 19 15 - 37 U/L    Globulin 3.4 g/dL   Troponin   Result Value Ref Range    Troponin <0.01 <0.01 ng/mL   EKG 12 Lead   Result Value Ref Range    Ventricular Rate 58 BPM    Atrial Rate 58 BPM    P-R Interval 150 ms    QRS Duration 72 ms    Q-T Interval 426 ms    QTc Calculation (Bazett) 418 ms    P Axis 9 degrees    R Axis -49 degrees    T Axis -1 degrees    Diagnosis       Sinus bradycardiaLeft axis deviationNonspecific ST abnormalityAbnormal ECGNo previous ECGs availableConfirmed by CARLOS HERNANDEZ MD ([id number]) on 10/3/2018 2:29:40 PM     Xr Chest Standard (2 Vw)    Result Date: 10/3/2018  EXAMINATION: TWO VIEWS OF THE CHEST; 3 XRAY VIEWS OF THE RIGHT SHOULDER; 5 XRAY VIEWS OF THE CERVICAL SPINE 10/3/2018 1:22 pm COMPARISON: 03/08/2018 HISTORY: ORDERING SYSTEM PROVIDED HISTORY: chest pain TECHNOLOGIST PROVIDED HISTORY: Reason for exam:->chest pain Ordering Physician Provided Reason for Exam: arm stable. Discussed results. EKG and troponin showed no acute ischemia. Symptoms have been ongoing arm pain for 5 months, pain at the right upper chest is reproducible has been constant for 2 days. Heart score of 2. I do not think this is cardiac related. She has pain in her right shoulder with movement and palpation consistent with musculo skeletal pain. Patient states needs to be at work at 3:00. She is discharged in good condition. Patient prescribed Naprosyn. Referral to orthopedics for follow-up. Also follow up with her primary care doctor. Advised returning to the ER for any worsening or change in her symptoms. She understands and agrees. I estimate there is LOW risk for PULMONARY EMBOLISM, ACUTE CORONARY SYNDROME, OR THORACIC AORTIC DISSECTION, thus I consider the discharge disposition reasonable. Dr. Mami Hadley spent face to face time with patient and agrees with above dx and treatment. Please note that this chart was generated using Dragon dictation software.  Although every effort was made to ensure the accuracy of this automated transcription, some errors in transcription may have occurred       Ava Blake PA-C  10/03/18 [id number]

## 2018-10-09 LAB
EKG ATRIAL RATE: 58 BPM
EKG DIAGNOSIS: NORMAL
EKG P AXIS: 9 DEGREES
EKG P-R INTERVAL: 150 MS
EKG Q-T INTERVAL: 426 MS
EKG QRS DURATION: 72 MS
EKG QTC CALCULATION (BAZETT): 418 MS
EKG R AXIS: -49 DEGREES
EKG T AXIS: -1 DEGREES
EKG VENTRICULAR RATE: 58 BPM

## 2019-03-20 ENCOUNTER — HOSPITAL ENCOUNTER (EMERGENCY)
Age: 49
Discharge: HOME | End: 2019-03-20
Attending: EMERGENCY MEDICINE
Payer: COMMERCIAL

## 2019-03-20 ENCOUNTER — APPOINTMENT (OUTPATIENT)
Dept: GENERAL RADIOLOGY | Age: 49
End: 2019-03-20
Payer: COMMERCIAL

## 2019-03-20 VITALS
RESPIRATION RATE: 16 BRPM | OXYGEN SATURATION: 100 % | BODY MASS INDEX: 33.87 KG/M2 | HEIGHT: 59 IN | HEART RATE: 59 BPM | DIASTOLIC BLOOD PRESSURE: 79 MMHG | SYSTOLIC BLOOD PRESSURE: 124 MMHG | WEIGHT: 168 LBS | TEMPERATURE: 97 F

## 2019-03-20 DIAGNOSIS — F41.1 ANXIETY STATE: ICD-10-CM

## 2019-03-20 DIAGNOSIS — I10 HYPERTENSION, UNSPECIFIED TYPE: Primary | ICD-10-CM

## 2019-03-20 DIAGNOSIS — R07.9 CHEST PAIN, UNSPECIFIED TYPE: ICD-10-CM

## 2019-03-20 DIAGNOSIS — Z76.0 ENCOUNTER FOR MEDICATION REFILL: ICD-10-CM

## 2019-03-20 LAB
A/G RATIO: 1.3 (ref 1.1–2.2)
ALBUMIN SERPL-MCNC: 4.2 G/DL (ref 3.4–5)
ALP BLD-CCNC: 90 U/L (ref 40–129)
ALT SERPL-CCNC: 8 U/L (ref 10–40)
ANION GAP SERPL CALCULATED.3IONS-SCNC: 9 MMOL/L (ref 3–16)
AST SERPL-CCNC: 16 U/L (ref 15–37)
BASOPHILS ABSOLUTE: 0.1 K/UL (ref 0–0.2)
BASOPHILS RELATIVE PERCENT: 1.3 %
BILIRUB SERPL-MCNC: <0.2 MG/DL (ref 0–1)
BILIRUBIN URINE: NEGATIVE
BLOOD, URINE: ABNORMAL
BUN BLDV-MCNC: 10 MG/DL (ref 7–20)
CALCIUM SERPL-MCNC: 9.5 MG/DL (ref 8.3–10.6)
CHLORIDE BLD-SCNC: 103 MMOL/L (ref 99–110)
CLARITY: CLEAR
CO2: 27 MMOL/L (ref 21–32)
COLOR: YELLOW
CREAT SERPL-MCNC: 0.7 MG/DL (ref 0.6–1.1)
EOSINOPHILS ABSOLUTE: 0.3 K/UL (ref 0–0.6)
EOSINOPHILS RELATIVE PERCENT: 2.5 %
EPITHELIAL CELLS, UA: ABNORMAL /HPF
GFR AFRICAN AMERICAN: >60
GFR NON-AFRICAN AMERICAN: >60
GLOBULIN: 3.3 G/DL
GLUCOSE BLD-MCNC: 113 MG/DL (ref 70–99)
GLUCOSE URINE: NEGATIVE MG/DL
HCT VFR BLD CALC: 36.9 % (ref 36–48)
HEMOGLOBIN: 12.5 G/DL (ref 12–16)
KETONES, URINE: NEGATIVE MG/DL
LEUKOCYTE ESTERASE, URINE: NEGATIVE
LYMPHOCYTES ABSOLUTE: 4.1 K/UL (ref 1–5.1)
LYMPHOCYTES RELATIVE PERCENT: 39.6 %
MCH RBC QN AUTO: 28.8 PG (ref 26–34)
MCHC RBC AUTO-ENTMCNC: 33.9 G/DL (ref 31–36)
MCV RBC AUTO: 85 FL (ref 80–100)
MICROSCOPIC EXAMINATION: YES
MONOCYTES ABSOLUTE: 0.6 K/UL (ref 0–1.3)
MONOCYTES RELATIVE PERCENT: 6.2 %
NEUTROPHILS ABSOLUTE: 5.2 K/UL (ref 1.7–7.7)
NEUTROPHILS RELATIVE PERCENT: 50.4 %
NITRITE, URINE: NEGATIVE
PDW BLD-RTO: 13.8 % (ref 12.4–15.4)
PH UA: 6 (ref 5–8)
PLATELET # BLD: 353 K/UL (ref 135–450)
PMV BLD AUTO: 8.1 FL (ref 5–10.5)
POTASSIUM REFLEX MAGNESIUM: 3.9 MMOL/L (ref 3.5–5.1)
PRO-BNP: 90 PG/ML (ref 0–124)
PROTEIN UA: NEGATIVE MG/DL
RBC # BLD: 4.34 M/UL (ref 4–5.2)
RBC UA: ABNORMAL /HPF (ref 0–2)
SODIUM BLD-SCNC: 139 MMOL/L (ref 136–145)
SPECIFIC GRAVITY UA: <=1.005 (ref 1–1.03)
TOTAL PROTEIN: 7.5 G/DL (ref 6.4–8.2)
TROPONIN: <0.01 NG/ML
TROPONIN: <0.01 NG/ML
URINE TYPE: ABNORMAL
UROBILINOGEN, URINE: 0.2 E.U./DL
WBC # BLD: 10.3 K/UL (ref 4–11)
WBC UA: ABNORMAL /HPF (ref 0–5)

## 2019-03-20 PROCEDURE — 83880 ASSAY OF NATRIURETIC PEPTIDE: CPT

## 2019-03-20 PROCEDURE — 85025 COMPLETE CBC W/AUTO DIFF WBC: CPT

## 2019-03-20 PROCEDURE — 6370000000 HC RX 637 (ALT 250 FOR IP): Performed by: EMERGENCY MEDICINE

## 2019-03-20 PROCEDURE — 71046 X-RAY EXAM CHEST 2 VIEWS: CPT

## 2019-03-20 PROCEDURE — 84484 ASSAY OF TROPONIN QUANT: CPT

## 2019-03-20 PROCEDURE — 81001 URINALYSIS AUTO W/SCOPE: CPT

## 2019-03-20 PROCEDURE — 36415 COLL VENOUS BLD VENIPUNCTURE: CPT

## 2019-03-20 PROCEDURE — 93005 ELECTROCARDIOGRAM TRACING: CPT | Performed by: EMERGENCY MEDICINE

## 2019-03-20 PROCEDURE — 80053 COMPREHEN METABOLIC PANEL: CPT

## 2019-03-20 PROCEDURE — 99283 EMERGENCY DEPT VISIT LOW MDM: CPT

## 2019-03-20 RX ORDER — LISINOPRIL 10 MG/1
10 TABLET ORAL ONCE
Status: COMPLETED | OUTPATIENT
Start: 2019-03-20 | End: 2019-03-20

## 2019-03-20 RX ORDER — HYDROCHLOROTHIAZIDE 12.5 MG/1
12.5 CAPSULE, GELATIN COATED ORAL EVERY MORNING
Qty: 30 CAPSULE | Refills: 1 | Status: SHIPPED | OUTPATIENT
Start: 2019-03-20 | End: 2019-04-15 | Stop reason: SDUPTHER

## 2019-03-20 RX ADMIN — LISINOPRIL 10 MG: 10 TABLET ORAL at 00:44

## 2019-03-20 ASSESSMENT — HEART SCORE: ECG: 1

## 2019-03-21 LAB
EKG ATRIAL RATE: 60 BPM
EKG ATRIAL RATE: 60 BPM
EKG DIAGNOSIS: NORMAL
EKG DIAGNOSIS: NORMAL
EKG P AXIS: 5 DEGREES
EKG P AXIS: 8 DEGREES
EKG P-R INTERVAL: 138 MS
EKG P-R INTERVAL: 144 MS
EKG Q-T INTERVAL: 424 MS
EKG Q-T INTERVAL: 432 MS
EKG QRS DURATION: 72 MS
EKG QRS DURATION: 78 MS
EKG QTC CALCULATION (BAZETT): 424 MS
EKG QTC CALCULATION (BAZETT): 432 MS
EKG R AXIS: -31 DEGREES
EKG R AXIS: -36 DEGREES
EKG T AXIS: 11 DEGREES
EKG T AXIS: 7 DEGREES
EKG VENTRICULAR RATE: 60 BPM
EKG VENTRICULAR RATE: 60 BPM

## 2019-03-21 PROCEDURE — 93010 ELECTROCARDIOGRAM REPORT: CPT | Performed by: INTERNAL MEDICINE

## 2019-04-15 ENCOUNTER — OFFICE VISIT (OUTPATIENT)
Dept: FAMILY MEDICINE CLINIC | Age: 49
End: 2019-04-15
Payer: COMMERCIAL

## 2019-04-15 VITALS
OXYGEN SATURATION: 99 % | SYSTOLIC BLOOD PRESSURE: 138 MMHG | WEIGHT: 183.6 LBS | BODY MASS INDEX: 37.01 KG/M2 | HEART RATE: 66 BPM | HEIGHT: 59 IN | DIASTOLIC BLOOD PRESSURE: 80 MMHG | RESPIRATION RATE: 16 BRPM

## 2019-04-15 DIAGNOSIS — R20.2 NUMBNESS AND TINGLING IN RIGHT HAND: ICD-10-CM

## 2019-04-15 DIAGNOSIS — F41.9 ANXIETY: ICD-10-CM

## 2019-04-15 DIAGNOSIS — F31.9 BIPOLAR 1 DISORDER (HCC): ICD-10-CM

## 2019-04-15 DIAGNOSIS — F32.A DEPRESSION, UNSPECIFIED DEPRESSION TYPE: ICD-10-CM

## 2019-04-15 DIAGNOSIS — M54.50 CHRONIC BILATERAL LOW BACK PAIN WITHOUT SCIATICA: ICD-10-CM

## 2019-04-15 DIAGNOSIS — I10 ESSENTIAL HYPERTENSION: Primary | ICD-10-CM

## 2019-04-15 DIAGNOSIS — M79.652 BILATERAL THIGH PAIN: ICD-10-CM

## 2019-04-15 DIAGNOSIS — G89.29 CHRONIC BILATERAL LOW BACK PAIN WITHOUT SCIATICA: ICD-10-CM

## 2019-04-15 DIAGNOSIS — R20.0 NUMBNESS AND TINGLING IN RIGHT HAND: ICD-10-CM

## 2019-04-15 DIAGNOSIS — M79.651 BILATERAL THIGH PAIN: ICD-10-CM

## 2019-04-15 DIAGNOSIS — Z98.890 HISTORY OF BACK SURGERY: ICD-10-CM

## 2019-04-15 DIAGNOSIS — J45.20 MILD INTERMITTENT ASTHMA WITHOUT COMPLICATION: ICD-10-CM

## 2019-04-15 PROCEDURE — 1036F TOBACCO NON-USER: CPT | Performed by: NURSE PRACTITIONER

## 2019-04-15 PROCEDURE — G8417 CALC BMI ABV UP PARAM F/U: HCPCS | Performed by: NURSE PRACTITIONER

## 2019-04-15 PROCEDURE — 99214 OFFICE O/P EST MOD 30 MIN: CPT | Performed by: NURSE PRACTITIONER

## 2019-04-15 PROCEDURE — G8427 DOCREV CUR MEDS BY ELIG CLIN: HCPCS | Performed by: NURSE PRACTITIONER

## 2019-04-15 RX ORDER — HYDROCHLOROTHIAZIDE 12.5 MG/1
12.5 CAPSULE, GELATIN COATED ORAL EVERY MORNING
Qty: 30 CAPSULE | Refills: 5 | Status: SHIPPED | OUTPATIENT
Start: 2019-04-15 | End: 2022-03-25 | Stop reason: CLARIF

## 2019-04-15 RX ORDER — BENZOCAINE/MENTHOL 6 MG-10 MG
LOZENGE MUCOUS MEMBRANE
Qty: 1 EACH | Refills: 0 | Status: SHIPPED | OUTPATIENT
Start: 2019-04-15 | End: 2022-03-25 | Stop reason: CLARIF

## 2019-04-15 RX ORDER — ALBUTEROL SULFATE 90 UG/1
2 AEROSOL, METERED RESPIRATORY (INHALATION) EVERY 6 HOURS PRN
Qty: 1 INHALER | Refills: 3 | Status: SHIPPED | OUTPATIENT
Start: 2019-04-15 | End: 2022-04-08 | Stop reason: CLARIF

## 2019-04-15 ASSESSMENT — ENCOUNTER SYMPTOMS
RESPIRATORY NEGATIVE: 1
BACK PAIN: 1
GASTROINTESTINAL NEGATIVE: 1

## 2019-04-15 ASSESSMENT — PATIENT HEALTH QUESTIONNAIRE - PHQ9
SUM OF ALL RESPONSES TO PHQ QUESTIONS 1-9: 2
1. LITTLE INTEREST OR PLEASURE IN DOING THINGS: 1
SUM OF ALL RESPONSES TO PHQ9 QUESTIONS 1 & 2: 2
2. FEELING DOWN, DEPRESSED OR HOPELESS: 1
SUM OF ALL RESPONSES TO PHQ QUESTIONS 1-9: 2

## 2019-04-15 NOTE — PROGRESS NOTES
4/15/2019    Leatha Campos (:  1970) is a 51 y.o. female, here for evaluation of the following medical concerns:    HPI  Patient presents today as a new patient to [address] [place] [address]. She has a history of anxiety, depression, bipolar, asthma, chronic back pain, bilateral thigh pain and numbness and tingling of her right hand. Anxiety/Depression/bipolar- was seeing [facility] in [place] [address]. [place]. She has been on prozac and celexa. Patient thinks the prozac helped some but not much. She is not interested in seeing just a therapist because she does not like to talk about her past.     Back pain- numbness in her thighs to her knees. Any pressure on her legs causes a lot of pain. She had back surgery in 2016. Recently she has tried some home therapy with out relief. Asthma- stable but she does need a refill of her albuterol. She notices she uses it more in the spring and summer months. Numbness in right hand- states her right hand is weak and will fall asleep and feel numb at times, she has a history of carpal tunnel surgery several years ago. Patient's past medical history, surgical history, family history, medications,  and allergies  were all reviewed and updated as appropriate today. Review of Systems   Constitutional: Negative. HENT: Negative. Respiratory: Negative. Cardiovascular: Negative. Gastrointestinal: Negative. Musculoskeletal: Positive for arthralgias and back pain. Skin: Negative. Neurological: Negative. Psychiatric/Behavioral: Positive for agitation and sleep disturbance. Negative for self-injury and suicidal ideas. The patient is nervous/anxious. Prior to Visit Medications    Medication Sig Taking?  Authorizing Provider   albuterol sulfate HFA (PROAIR HFA) 108 (90 Base) MCG/ACT inhaler Inhale 2 puffs into the lungs every 6 hours as needed for Wheezing Yes KARINA Ramsay - CHELSEA   hydrochlorothiazide (MICROZIDE) 12.5 MG capsule Take 1 capsule by mouth every morning Yes KARINA Barajas CNP   Blood Pressure Monitor MISC To check blood pressure daily Yes KARINA Omalley CNP   naproxen (NAPROSYN) 500 MG tablet Take 1 tablet by mouth 2 times daily (with meals) for 10 days  Beti Skelton PA-C        No Known Allergies    Past Medical History:   Diagnosis Date    Anxiety     Chronic back pain     Cyst (solitary) of breast     RIGHT BREAST    Depression     Hypertension        Past Surgical History:   Procedure Laterality Date    BACK SURGERY  11/30/2016    CARPAL TUNNEL RELEASE      CHOLECYSTECTOMY      EYE SURGERY      HYSTERECTOMY      TUBAL LIGATION         Social History     Socioeconomic History    Marital status:      Spouse name: Not on file    Number of children: Not on file    Years of education: Not on file    Highest education level: Not on file   Occupational History    Not on file   Social Needs    Financial resource strain: Not on file    Food insecurity:     Worry: Not on file     Inability: Not on file    Transportation needs:     Medical: Not on file     Non-medical: Not on file   Tobacco Use    Smoking status: Former Smoker     Last attempt to quit: 3/6/2009     Years since quitting: 10.1    Smokeless tobacco: Never Used   Substance and Sexual Activity    Alcohol use: No     Comment: 1 beer a month    Drug use: No    Sexual activity: Yes     Partners: Male   Lifestyle    Physical activity:     Days per week: Not on file     Minutes per session: Not on file    Stress: Not on file   Relationships    Social connections:     Talks on phone: Not on file     Gets together: Not on file     Attends Sikhism service: Not on file     Active member of club or organization: Not on file     Attends meetings of clubs or organizations: Not on file     Relationship status: Not on file    Intimate partner violence:     Fear of current or ex partner: Not on file     Emotionally abused: Not on file Physically abused: Not on file     Forced sexual activity: Not on file   Other Topics Concern    Not on file   Social History Narrative    Not on file        Family History   Problem Relation Age of Onset    Heart Disease Mother     High Blood Pressure Mother     Heart Disease Father     High Blood Pressure Father     Heart Disease Paternal Grandfather        Vitals:    04/15/19 1502   BP: 138/80   Pulse: 66   Resp: 16   SpO2: 99%   Weight: 183 lb 9.6 oz (83.3 kg)   Height: 4' 11\" (1.499 m)     Estimated body mass index is 37.08 kg/m² as calculated from the following:    Height as of this encounter: 4' 11\" (1.499 m). Weight as of this encounter: 183 lb 9.6 oz (83.3 kg). Physical Exam   Constitutional: She appears well-developed and well-nourished. Cardiovascular: Normal rate and regular rhythm. Pulmonary/Chest: Effort normal and breath sounds normal.   Abdominal: Soft. Bowel sounds are normal.   Musculoskeletal:        Lumbar back: She exhibits tenderness and pain. She exhibits normal range of motion, no swelling and no edema. Positive bilateral straight leg raises. Skin: Skin is warm and dry. Vitals reviewed. ASSESSMENT/PLAN:  1. Essential hypertension  Stable, continue current blood pressure medications  - hydrochlorothiazide (MICROZIDE) 12.5 MG capsule; Take 1 capsule by mouth every morning  Dispense: 30 capsule; Refill: 5  - Blood Pressure Monitor MISC; To check blood pressure daily  Dispense: 1 each; Refill: 0    2. Depression, unspecified depression type  Will check gene sight testing to see which medications will work best for her    3. Bipolar 1 disorder (HCC)  Gene sight testing    4. Anxiety  Will check gene sight testing    5. Mild intermittent asthma without complication  Stable, will refill albuterol to uses as needed  - albuterol sulfate HFA (PROAIR HFA) 108 (90 Base) MCG/ACT inhaler;  Inhale 2 puffs into the lungs every 6 hours as needed for Wheezing  Dispense: 1 Inhaler; Refill: 3    6. Chronic bilateral low back pain without sciatica  Will send to physical therapy. She needs to complete at least 6 weeks and then if no improvement will consider MrI. - [place]    7. Bilateral thigh pain  See above  - [facility] - [place]    8. History of back surgery  See above    9. Numbness and tingling in right hand  Will check EMG for possible carpal tunnel  - EMG; Future      Return in about 1 month (around 5/15/2019) for follow up medications. An  electronic signature was used to authenticate this note.     --KARINA Conteh - CNP on 4/15/2019 at 5:32 PM

## 2019-04-17 ENCOUNTER — HOSPITAL ENCOUNTER (OUTPATIENT)
Dept: NEUROLOGY | Age: 49
Discharge: HOME | End: 2019-04-17
Payer: COMMERCIAL

## 2019-04-17 DIAGNOSIS — R20.2 NUMBNESS AND TINGLING IN RIGHT HAND: ICD-10-CM

## 2019-04-17 DIAGNOSIS — R20.0 NUMBNESS AND TINGLING IN RIGHT HAND: ICD-10-CM

## 2019-04-17 PROCEDURE — 95908 NRV CNDJ TST 3-4 STUDIES: CPT

## 2019-04-17 PROCEDURE — 95886 MUSC TEST DONE W/N TEST COMP: CPT

## 2019-04-17 NOTE — PROCEDURES
Electrodiagnostic Report  [facility]  Physical Medicine & Rehabilitation    04/17/19    Cynthia Wakefield Fist  51 y.o.  1970  [id number]  Referring Provider: RESHMA Barajas    Clinical Problem:  52 y/o with history of right hand weakness with pain in right arm with shoulder pain, onset 6-7 months. No known injury. PMH: + low back surgery 2 yrs ago. No neck or arm surgery + right carpal tunnel surgery 2001. No endocrine disease.  PE: reflexes trace, mild right hand intrinsic weakness    EMG SUMMARY TABLE RIGHT UPPER     Spontaneous     MUAP   Recruitment    Insertional Activity Fibrillation Potential Positive Sharp Waves   Fasiculation High Frequency Potentials   Amp Duration PPP Pattern   Deltoid C5.6 Ax normal none none none none normal normal normal normal   Biceps C5,6 musc cut normal none none none none normal normal normal normal   Triceps C6,7,8 Radial normal none none none none normal normal normal normal   Pronator Teres C6,7 Median normal none none none none normal normal normal normal   Brachio Rad C5,6 Radial normal none none none none normal normal normal normal   Ext Ind Prop C7,8 Radial normal none none none none normal normal normal normal   Oppones Poll C8-T1 Median normal none none none none normal normal normal normal   1st Dorsal Int C8-T1 Ulnar normal none none none none normal normal normal normal   Cerv Paraspinals C2-T1 PPR       normal none none none none normal normal normal normal     Nerve Conduction Studies     Nerve Sensory Distal Latency (msec) Sensory Distal Latency (msec) Amp uv Amp uv Motor Distal Latency (msec) Motor Distal Latency (msec) Amp kv Amp kv Motor NCV (m/sec) Motor NCV (m/sec)    Right Left Right Left Right Left Right Left Right Left   Median 3.7 (n<3.6) (n<3.6) 80  4.3 (n<4.3) (n<4.3) 6.4  56 (n>50) (n>50)   Ulnar 3.2 (n<3.7) (n<3.7) 37   3.8 (n<4.2) (n<4.2) 9.6   8.8  58 b.e(n>50)   44 a.e(>45) b.e(n>50)   a.e(>45)   Radial (n<3.5) (n<3.5)             Summary of EMG and Nerve Conduction Findings: The above EMG needle exam was within normal limits. Nerve conduction studies demonstrate slowing of right ulnar motor conduction velocity across elbow. Remainder within normal limits. Overall Impression: mild right ulnar neuropathy at elbow. No evidence of an acute radiculopathy or other entrapment neuropathy. Thank you. Electronically signed by:  Ethan Hammer DO,4/17/2019,8:15 AM

## 2019-04-22 ENCOUNTER — TELEPHONE (OUTPATIENT)
Dept: FAMILY MEDICINE CLINIC | Age: 49
End: 2019-04-22

## 2019-04-22 RX ORDER — VENLAFAXINE HYDROCHLORIDE 75 MG/1
75 CAPSULE, EXTENDED RELEASE ORAL DAILY
Qty: 30 CAPSULE | Refills: 0 | Status: SHIPPED | OUTPATIENT
Start: 2019-04-22 | End: 2019-04-23 | Stop reason: SDUPTHER

## 2019-04-23 ENCOUNTER — TELEPHONE (OUTPATIENT)
Dept: FAMILY MEDICINE CLINIC | Age: 49
End: 2019-04-23

## 2019-04-23 RX ORDER — VENLAFAXINE HYDROCHLORIDE 75 MG/1
75 CAPSULE, EXTENDED RELEASE ORAL DAILY
Qty: 30 CAPSULE | Refills: 0 | Status: SHIPPED | OUTPATIENT
Start: 2019-04-23 | End: 2022-03-25 | Stop reason: CLARIF

## 2019-04-23 NOTE — TELEPHONE ENCOUNTER
Pt states that her script for venlafaxine 75mg was never received at [facility]'s [facility] [place] please resumit

## 2019-04-25 ENCOUNTER — HOSPITAL ENCOUNTER (OUTPATIENT)
Dept: PHYSICAL THERAPY | Age: 49
Setting detail: THERAPIES SERIES
Discharge: HOME | End: 2019-04-25
Payer: COMMERCIAL

## 2019-04-25 NOTE — FLOWSHEET NOTE
Physical Therapy  Cancellation/No-show Note    Patient Name:  Naeem Baker  :  1970   Date:  2019  Cancels to Date: 1  No-shows to Date: 0    For today's appointment patient:  [x]  Cancelled  []  Rescheduled appointment  []  No-show     Reason given by patient:  [x]  Patient ill  []  Conflicting appointment  []  No transportation    []  Conflict with work  []  No reason given  []  Other:     Comments:      Electronically signed by:  Keren Tinsley PT, DPT #[id number]

## 2019-05-20 ENCOUNTER — OFFICE VISIT (OUTPATIENT)
Dept: FAMILY MEDICINE CLINIC | Age: 49
End: 2019-05-20
Payer: COMMERCIAL

## 2019-05-20 VITALS
TEMPERATURE: 98.2 F | WEIGHT: 181.6 LBS | DIASTOLIC BLOOD PRESSURE: 80 MMHG | SYSTOLIC BLOOD PRESSURE: 132 MMHG | OXYGEN SATURATION: 99 % | HEART RATE: 72 BPM | BODY MASS INDEX: 36.68 KG/M2

## 2019-05-20 DIAGNOSIS — F41.9 ANXIETY: Primary | ICD-10-CM

## 2019-05-20 DIAGNOSIS — E11.9 TYPE 2 DIABETES MELLITUS WITHOUT COMPLICATION, WITHOUT LONG-TERM CURRENT USE OF INSULIN (HCC): ICD-10-CM

## 2019-05-20 LAB — HBA1C MFR BLD: 7.2 %

## 2019-05-20 PROCEDURE — 1036F TOBACCO NON-USER: CPT | Performed by: NURSE PRACTITIONER

## 2019-05-20 PROCEDURE — G8427 DOCREV CUR MEDS BY ELIG CLIN: HCPCS | Performed by: NURSE PRACTITIONER

## 2019-05-20 PROCEDURE — 3045F PR MOST RECENT HEMOGLOBIN A1C LEVEL 7.0-9.0%: CPT | Performed by: NURSE PRACTITIONER

## 2019-05-20 PROCEDURE — G8417 CALC BMI ABV UP PARAM F/U: HCPCS | Performed by: NURSE PRACTITIONER

## 2019-05-20 PROCEDURE — 83036 HEMOGLOBIN GLYCOSYLATED A1C: CPT | Performed by: NURSE PRACTITIONER

## 2019-05-20 PROCEDURE — 99213 OFFICE O/P EST LOW 20 MIN: CPT | Performed by: NURSE PRACTITIONER

## 2019-05-20 PROCEDURE — 2022F DILAT RTA XM EVC RTNOPTHY: CPT | Performed by: NURSE PRACTITIONER

## 2019-05-20 RX ORDER — BUSPIRONE HYDROCHLORIDE 10 MG/1
10 TABLET ORAL 2 TIMES DAILY
Qty: 60 TABLET | Refills: 2 | Status: SHIPPED | OUTPATIENT
Start: 2019-05-20 | End: 2019-06-19

## 2019-05-20 RX ORDER — VENLAFAXINE HYDROCHLORIDE 150 MG/1
150 CAPSULE, EXTENDED RELEASE ORAL DAILY
Qty: 30 CAPSULE | Refills: 2 | Status: SHIPPED | OUTPATIENT
Start: 2019-05-20 | End: 2022-03-25 | Stop reason: CLARIF

## 2019-05-20 RX ORDER — METFORMIN HYDROCHLORIDE 500 MG/1
500 TABLET, EXTENDED RELEASE ORAL
Qty: 30 TABLET | Refills: 3 | Status: SHIPPED | OUTPATIENT
Start: 2019-05-20 | End: 2022-03-25 | Stop reason: CLARIF

## 2019-05-20 NOTE — PATIENT INSTRUCTIONS
Patient Education        Counting Carbohydrates: Care Instructions  Your Care Instructions    You don't have to eat special foods when you have diabetes. You just have to be careful to eat healthy foods. Carbohydrates (carbs) raise blood sugar higher and quicker than any other nutrient. Carbs are found in desserts, breads and cereals, and fruit. They're also in starchy vegetables. These include potatoes, corn, and grains such as rice and pasta. Carbs are also in milk and yogurt. The more carbs you eat at one time, the higher your blood sugar will rise. Spreading carbs all through the day helps keep your blood sugar levels within your target range. Counting carbs is one of the best ways to keep your blood sugar under control. If you use insulin, counting carbs helps you match the right amount of insulin to the number of grams of carbs in a meal. Then you can change your diet and insulin dose as needed. Testing your blood sugar several times a day can help you learn how carbs affect your blood sugar. A registered dietitian or certified diabetes educator can help you plan meals and snacks. Follow-up care is a key part of your treatment and safety. Be sure to make and go to all appointments, and call your doctor if you are having problems. It's also a good idea to know your test results and keep a list of the medicines you take. How can you care for yourself at home? Know your daily amount of carbohydrates  Your daily amount depends on several things, such as your weight, how active you are, which diabetes medicines you take, and what your goals are for your blood sugar levels. A registered dietitian or certified diabetes educator can help you plan how many carbs to include in each meal and snack. For most adults, a guideline for the daily amount of carbs is:  · 45 to 60 grams at each meal. That's about the same as 3 to 4 carbohydrate servings. · 15 to 20 grams at each snack.  That's about the same as 1 alcohol, talk to your doctor. It may not be recommended when you are taking certain diabetes medicines. Where can you learn more? Go to [email or web address] org and sign in to your [employer] account. Enter S974 in the [facility] box to learn more about \"Counting Carbohydrates: Care Instructions. \"     If you do not have an account, please click on the \"Sign Up Now\" link. Current as of: July 25, 2018  Content Version: 12.0  © 8209-6744 Healthwise, Incorporated. Care instructions adapted under license by [facility] ([facility]). If you have questions about a medical condition or this instruction, always ask your healthcare professional. [address] any warranty or liability for your use of this information.

## 2019-05-20 NOTE — LETTER
[address] [place] [id number]  Phone: [phone]  Fax: [phone]    KARINA Morataya - CHELSEA        May 20, 2019     Patient: Ana Doyle   YOB: 1970   Date of Visit: 5/20/2019       To Whom It May Concern:  Patient was seen in office and examined today 5-20-19  It is my medical opinion that Remigio Shirley is Able to return to work on 5-21-19. If you have any questions or concerns, please don't hesitate to call.     Sincerely,        KARINA Morataya - CNP

## 2019-05-20 NOTE — PROGRESS NOTES
2019     Leatha Campos (:  1970) is a 51 y.o. female, here for evaluation of the following medical concerns:    HPI   Patient presents today for a 1 month follow up on anxiety. She was started on Effexor 75 mg daily. She states she has noticed a slight difference but still feels she needs the dosage increased. She feels like she is having panic attacks at work during the day. She also feels like her blood pressure is running high after work. She is concerned she might have diabetes. She was diagnosed with pre-diabetes last year. She states she has tried to watch her diet a little better lately but has not noticed any improvement in her weight. Review of Systems   Constitutional: Negative. HENT: Negative. Respiratory: Negative. Cardiovascular: Negative. Gastrointestinal: Negative. Musculoskeletal: Negative. Skin: Negative. Neurological: Negative. Psychiatric/Behavioral: Positive for agitation and sleep disturbance. Negative for self-injury and suicidal ideas. The patient is nervous/anxious. Prior to Visit Medications    Medication Sig Taking?  Authorizing Provider   venlafaxine (EFFEXOR XR) 150 MG extended release capsule Take 1 capsule by mouth daily Yes KARINA Omalley CNP   busPIRone (BUSPAR) 10 MG tablet Take 1 tablet by mouth 2 times daily Yes KARINA Brennan CNP   metFORMIN (GLUCOPHAGE-XR) 500 MG extended release tablet Take 1 tablet by mouth daily (with breakfast) Yes KARINA Brennan CNP   venlafaxine (EFFEXOR XR) 75 MG extended release capsule Take 1 capsule by mouth daily Yes KARINA Omalley CNP   albuterol sulfate HFA (PROAIR HFA) 108 (90 Base) MCG/ACT inhaler Inhale 2 puffs into the lungs every 6 hours as needed for Wheezing Yes KARINA Brennan CNP   hydrochlorothiazide (MICROZIDE) 12.5 MG capsule Take 1 capsule by mouth every morning Yes KARINA Brennan CNP   Blood Pressure Monitor MISC To check blood about 1 month (around 6/20/2019) for follow up medications, schedule with Catee for nutrition counseling. An electronic signature was used to authenticate this note.     --Celestina Maldonado, KARINA - CNP on 5/21/2019 at 9:38 AM

## 2019-05-21 ASSESSMENT — ENCOUNTER SYMPTOMS
RESPIRATORY NEGATIVE: 1
GASTROINTESTINAL NEGATIVE: 1

## 2019-06-02 ENCOUNTER — HOSPITAL ENCOUNTER (EMERGENCY)
Age: 49
Discharge: HOME | End: 2019-06-02
Attending: EMERGENCY MEDICINE
Payer: COMMERCIAL

## 2019-06-02 VITALS
HEART RATE: 81 BPM | TEMPERATURE: 99.6 F | WEIGHT: 175 LBS | HEIGHT: 59 IN | SYSTOLIC BLOOD PRESSURE: 128 MMHG | BODY MASS INDEX: 35.28 KG/M2 | RESPIRATION RATE: 12 BRPM | OXYGEN SATURATION: 99 % | DIASTOLIC BLOOD PRESSURE: 86 MMHG

## 2019-06-02 DIAGNOSIS — J06.9 ACUTE UPPER RESPIRATORY INFECTION: ICD-10-CM

## 2019-06-02 DIAGNOSIS — R05.9 COUGH: Primary | ICD-10-CM

## 2019-06-02 DIAGNOSIS — R09.81 NASAL CONGESTION: ICD-10-CM

## 2019-06-02 PROCEDURE — 99283 EMERGENCY DEPT VISIT LOW MDM: CPT

## 2019-06-02 ASSESSMENT — PAIN SCALES - GENERAL: PAINLEVEL_OUTOF10: 4

## 2019-06-02 NOTE — ED PROVIDER NOTES
Triage Chief Complaint:   Pharyngitis (Pt c/o sore throat, congestion, headache, and fever that started Thursday. )    [place]:  Henrik Beth is a 51 y.o. female that presents with sore throat, congestion, headache, fever beginning proximately 4 days ago. Patient states that she has had some intermittent chills. She denies any myalgias. Apparently several other people at work are sick with similar symptoms. Patient states that she has been taking Tylenol and ibuprofen however continues to feel badly so came to the ED. She took her temperature earlier today and it was 101°F at home.     ROS:  General:  + fevers, + chills, no weakness  Eyes:  No recent vison changes, no discharge  ENT:  + sore throat, + nasal congestion, no hearing changes  Cardiovascular:  No chest pain, no palpitations  Respiratory:  No shortness of breath, + cough, no wheezing  Gastrointestinal:  No pain, no nausea, no vomiting, no diarrhea  Musculoskeletal:  No muscle pain  Skin:  No rash,   Neurologic:  No speech problems, no headache  Genitourinary:  No dysuria  Extremities:  no edema, no pain    Past Medical History:   Diagnosis Date    Anxiety     Chronic back pain     Cyst (solitary) of breast     RIGHT BREAST    Depression     Hypertension      Past Surgical History:   Procedure Laterality Date    BACK SURGERY  11/30/2016    CARPAL TUNNEL RELEASE      CHOLECYSTECTOMY      EYE SURGERY      HYSTERECTOMY      TUBAL LIGATION       Family History   Problem Relation Age of Onset    Heart Disease Mother     High Blood Pressure Mother     Heart Disease Father     High Blood Pressure Father     Heart Disease Paternal Grandfather      Social History     Socioeconomic History    Marital status:      Spouse name: Not on file    Number of children: Not on file    Years of education: Not on file    Highest education level: Not on file   Occupational History    Not on file   Social Needs    Financial resource strain: Not on Blood Pressure Monitor MISC To check blood pressure daily 1 each 0    naproxen (NAPROSYN) 500 MG tablet Take 1 tablet by mouth 2 times daily (with meals) for 10 days 20 tablet 0     No Known Allergies    Nursing Notes Reviewed    Physical Exam:  ED Triage Vitals [06/02/19 1252]   Enc Vitals Group      /86      Pulse 81      Resp 12      Temp 99.6 °F (37.6 °C)      Temp Source Oral      SpO2 99 %      Weight 175 lb (79.4 kg)      Height 4' 11\" (1.499 m)      Head Circumference       Peak Flow       Pain Score       Pain Loc       Pain Edu? Excl. in [address]? My pulse ox interpretation is - normal    General appearance:  No acute distress. Skin:  Warm. Dry. Eye:  Extraocular movements intact. Ears, nose, mouth and throat:  Oral mucosa moist, tympanic membranes clear bilaterally, posterior pharynx with mild erythema but no exudate, nasal congestion noted, bilateral tympanic membranes normal.   Neck:  Trachea midline. Slight anterior cervical chain lymphadenopathy  Extremity:   Normal ROM     Heart:  Regular rate and rhythm, normal S1 & S2, no extra heart sounds. Perfusion:  intact  Respiratory:  Lungs clear to auscultation bilaterally. Respirations nonlabored. Abdominal:  Normal bowel sounds. Soft. Nontender. Non distended. Neurological:  Alert and oriented times 3. I have reviewed and interpreted all of the currently available lab results from this visit (if applicable):  No results found for this visit on 06/02/19. Radiographs (if obtained):  [] The following radiograph was interpreted by myself in the absence of a radiologist:   [] Radiologist's Report Reviewed:  No orders to display       Chart review shows recent radiographs:  No results found. MDM:  At this point symptoms appear most consistent with a URI, versus another process early in its course. Patient does not have any cardiac symptoms, presentation is not consistent with pulmonary embolus.   Patient will be treated symptomatically and will be discharged home. Patient was instructed on symptomatic treatment, monitoring and outpatient followup. Patient understands and agrees with the plan, return warnings given. I estimate there is LOW risk for EPIGLOTTITIS, PNEUMONIA, MENINGITIS, pulmonary embolism, COPD/asthma, pneumonia, bacterial sinusitis, viral/strep pharyngitis, otitis media, otitis externa, OR URINARY TRACT INFECTION, thus I consider the discharge disposition reasonable. Also, there is no evidence or peritonitis, sepsis, or toxicity. Leatha BARKER Andres and I have discussed the diagnosis and risks, and we agree with discharging home to follow-up with their primary doctor. We also discussed returning to the Emergency Department immediately if new or worsening symptoms occur. We have discussed the symptoms which are most concerning (e.g., changing or worsening pain, trouble swallowing or breating, neck stiffness, fever) that necessitate immediate return. Clinical Impression:  1. Cough    2. Nasal congestion    3. Acute upper respiratory infection      Disposition referral (if applicable):  Carlito Bolaños, APRN - CNP  [address] [facility]  [address] [address] [facility]  [phone]    Schedule an appointment as soon as possible for a visit in 1 week      [place] [id number]. [facility] Emergency Department  [address]  [phone]  Go to   If symptoms worsen    Disposition medications (if applicable):  Discharge Medication List as of 6/2/2019 12:54 PM          Comment: Please note this report has been produced using speech recognition software and may contain errors related to that system including errors in grammar, punctuation, and spelling, as well as words and phrases that may be inappropriate. If there are any questions or concerns please feel free to contact the dictating provider for clarification.       Catherine Mo MD  06/02/19 3703

## 2019-06-02 NOTE — ED NOTES
Patient provided with discharge instructions. Follow-up reviewed with patient/family. No further questions verbalized at this time. Vital signs and patient stable upon discharge.         Nuha Kapoor RN  06/02/19 2905

## 2019-06-02 NOTE — ED NOTES
Pt to ED with c/o sore throat, fever, head congestion that began on Thursday. Pt alert and oriented, sitting up in bed. No acute distress otherwise noted. VS updated and stable. Family and MD at bedside at this time. Will cont to monitor.       Karan Velazquez RN  06/02/19 1300

## 2019-07-17 ENCOUNTER — APPOINTMENT (OUTPATIENT)
Dept: CT IMAGING | Age: 49
End: 2019-07-17
Payer: COMMERCIAL

## 2019-07-17 ENCOUNTER — APPOINTMENT (OUTPATIENT)
Dept: GENERAL RADIOLOGY | Age: 49
End: 2019-07-17
Payer: COMMERCIAL

## 2019-07-17 ENCOUNTER — HOSPITAL ENCOUNTER (EMERGENCY)
Age: 49
Discharge: HOME | End: 2019-07-17
Payer: COMMERCIAL

## 2019-07-17 VITALS
RESPIRATION RATE: 16 BRPM | SYSTOLIC BLOOD PRESSURE: 155 MMHG | HEIGHT: 59 IN | HEART RATE: 80 BPM | OXYGEN SATURATION: 99 % | BODY MASS INDEX: 34.07 KG/M2 | DIASTOLIC BLOOD PRESSURE: 88 MMHG | TEMPERATURE: 98.2 F | WEIGHT: 169 LBS

## 2019-07-17 DIAGNOSIS — R03.0 ELEVATED BLOOD PRESSURE READING: ICD-10-CM

## 2019-07-17 DIAGNOSIS — S20.221A BACK CONTUSION, RIGHT, INITIAL ENCOUNTER: ICD-10-CM

## 2019-07-17 DIAGNOSIS — S16.1XXA CERVICAL STRAIN, ACUTE, INITIAL ENCOUNTER: ICD-10-CM

## 2019-07-17 DIAGNOSIS — S09.90XA CLOSED HEAD INJURY, INITIAL ENCOUNTER: ICD-10-CM

## 2019-07-17 DIAGNOSIS — S93.502A SPRAIN OF LEFT GREAT TOE, INITIAL ENCOUNTER: ICD-10-CM
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
## 2019-07-17 LAB
AMORPHOUS: ABNORMAL /HPF
BACTERIA: ABNORMAL /HPF
BILIRUBIN URINE: NEGATIVE
BLOOD, URINE: ABNORMAL
CLARITY: CLEAR
COLOR: YELLOW
EPITHELIAL CELLS, UA: ABNORMAL /HPF
GLUCOSE URINE: NEGATIVE MG/DL
KETONES, URINE: NEGATIVE MG/DL
LEUKOCYTE ESTERASE, URINE: NEGATIVE
MICROSCOPIC EXAMINATION: YES
NITRITE, URINE: NEGATIVE
PH UA: 6 (ref 5–8)
PROTEIN UA: NEGATIVE MG/DL
RBC UA: ABNORMAL /HPF (ref 0–2)
SPECIFIC GRAVITY UA: >=1.03 (ref 1–1.03)
URINE TYPE: ABNORMAL
UROBILINOGEN, URINE: 0.2 E.U./DL
WBC UA: ABNORMAL /HPF (ref 0–5)

## 2019-07-17 PROCEDURE — 81001 URINALYSIS AUTO W/SCOPE: CPT

## 2019-07-17 PROCEDURE — 71101 X-RAY EXAM UNILAT RIBS/CHEST: CPT

## 2019-07-17 PROCEDURE — 72125 CT NECK SPINE W/O DYE: CPT

## 2019-07-17 PROCEDURE — 70450 CT HEAD/BRAIN W/O DYE: CPT

## 2019-07-17 PROCEDURE — 73630 X-RAY EXAM OF FOOT: CPT

## 2019-07-17 PROCEDURE — 99284 EMERGENCY DEPT VISIT MOD MDM: CPT

## 2019-07-17 RX ORDER — METHOCARBAMOL 500 MG/1
500 TABLET, FILM COATED ORAL 4 TIMES DAILY
Qty: 15 TABLET | Refills: 0 | Status: SHIPPED | OUTPATIENT
Start: 2019-07-17 | End: 2019-07-21

## 2019-07-17 RX ORDER — NAPROXEN 500 MG/1
500 TABLET ORAL 2 TIMES DAILY WITH MEALS
Qty: 14 TABLET | Refills: 0 | Status: SHIPPED | OUTPATIENT
Start: 2019-07-17 | End: 2022-04-08 | Stop reason: CLARIF

## 2019-07-17 RX ORDER — LIDOCAINE 50 MG/G
1 PATCH TOPICAL EVERY 24 HOURS
Qty: 7 PATCH | Refills: 0 | Status: SHIPPED | OUTPATIENT
Start: 2019-07-17 | End: 2019-07-24

## 2019-07-17 ASSESSMENT — PAIN DESCRIPTION - PAIN TYPE: TYPE: ACUTE PAIN

## 2019-07-17 ASSESSMENT — ENCOUNTER SYMPTOMS
VOMITING: 0
BACK PAIN: 1
ABDOMINAL PAIN: 0
SHORTNESS OF BREATH: 0

## 2019-07-17 ASSESSMENT — PAIN DESCRIPTION - ORIENTATION: ORIENTATION: RIGHT

## 2019-07-17 ASSESSMENT — PAIN DESCRIPTION - FREQUENCY: FREQUENCY: CONTINUOUS

## 2019-07-17 ASSESSMENT — PAIN SCALES - GENERAL: PAINLEVEL_OUTOF10: 8

## 2019-07-17 ASSESSMENT — PAIN DESCRIPTION - ONSET: ONSET: SUDDEN

## 2019-07-17 ASSESSMENT — PAIN DESCRIPTION - DESCRIPTORS: DESCRIPTORS: SORE;TENDER

## 2019-07-17 ASSESSMENT — PAIN DESCRIPTION - LOCATION: LOCATION: BACK;RIB CAGE

## 2019-07-17 ASSESSMENT — PAIN DESCRIPTION - PROGRESSION: CLINICAL_PROGRESSION: NOT CHANGED

## 2019-07-17 NOTE — ED PROVIDER NOTES
PROVIDED HISTORY: Reason for exam:->Trauma, R/O fx Reason for exam:->attention great toe Reason for Exam: fall X 1 day ago, pain around 1st digit Acuity: Acute Type of Exam: Initial Mechanism of Injury: fall FINDINGS: No stress or traumatic fractures are identified. The Lisfranc joint is congruent on these nonweightbearing images. No soft tissue abnormalities are detected. No radiopaque foreign bodies are found. No acute abnormality detected. Ct Head Wo Contrast    Result Date: 7/17/2019  EXAMINATION: CT OF THE HEAD WITHOUT CONTRAST  7/17/2019 7:46 pm TECHNIQUE: CT of the head was performed without the administration of intravenous contrast. Dose modulation, iterative reconstruction, and/or weight based adjustment of the mA/kV was utilized to reduce the radiation dose to as low as reasonably achievable. COMPARISON: None. HISTORY: ORDERING SYSTEM PROVIDED HISTORY: HEAD INJURY MILD OR MODERATE ACUTE, NO NEUROLOGICAL DEFICIT TECHNOLOGIST PROVIDED HISTORY: Has a \"code stroke\" or \"stroke alert\" been called? ->No Reason for Exam: fall X 1 day, hit the back of her head Acuity: Acute Type of Exam: Initial Mechanism of Injury: fall FINDINGS: BRAIN/VENTRICLES: There is no acute intracranial hemorrhage, mass effect or midline shift. No abnormal extra-axial fluid collection. The gray-white differentiation is maintained without evidence of an acute infarct. There is no evidence of hydrocephalus. ORBITS: The visualized portion of the orbits demonstrate no acute abnormality. SINUSES: The visualized paranasal sinuses and mastoid air cells demonstrate no acute abnormality. SOFT TISSUES/SKULL:  No acute abnormality of the visualized skull or soft tissues. No acute traumatic intracranial abnormality.      Ct Cervical Spine Wo Contrast    Result Date: 7/17/2019  EXAMINATION: CT OF THE CERVICAL SPINE WITHOUT CONTRAST 7/17/2019 7:46 pm TECHNIQUE: CT of the cervical spine was performed without the administration of

## 2019-07-17 NOTE — LETTER
[address] [facility] Emergency Department  [place] [address]  Phone: [phone]               July 17, 2019    Patient: Lilliana Sequeira   YOB: 1970   Date of Visit: 7/17/2019       To Whom It May Concern:    Leon Folds was seen and treated in our emergency department on 7/17/2019. No heavy lifting through 7/24/19.       Sincerely,       Hilaria Mullen RN         Signature:__________________________________

## 2019-10-28 ENCOUNTER — HOSPITAL ENCOUNTER (EMERGENCY)
Age: 49
Discharge: HOME | End: 2019-10-28
Attending: EMERGENCY MEDICINE

## 2019-10-28 VITALS
TEMPERATURE: 97.9 F | HEART RATE: 69 BPM | OXYGEN SATURATION: 98 % | SYSTOLIC BLOOD PRESSURE: 147 MMHG | DIASTOLIC BLOOD PRESSURE: 84 MMHG | HEIGHT: 59 IN | BODY MASS INDEX: 35.48 KG/M2 | WEIGHT: 176 LBS | RESPIRATION RATE: 16 BRPM

## 2019-10-28 DIAGNOSIS — R10.13 EPIGASTRIC PAIN: Primary | ICD-10-CM

## 2019-10-28 LAB
A/G RATIO: 1.1 (ref 1.1–2.2)
ALBUMIN SERPL-MCNC: 4.4 G/DL (ref 3.4–5)
ALP BLD-CCNC: 96 U/L (ref 40–129)
ALT SERPL-CCNC: 17 U/L (ref 10–40)
ANION GAP SERPL CALCULATED.3IONS-SCNC: 12 MMOL/L (ref 3–16)
AST SERPL-CCNC: 18 U/L (ref 15–37)
BACTERIA: ABNORMAL /HPF
BASOPHILS ABSOLUTE: 0.1 K/UL (ref 0–0.2)
BASOPHILS RELATIVE PERCENT: 0.7 %
BILIRUB SERPL-MCNC: <0.2 MG/DL (ref 0–1)
BILIRUBIN URINE: NEGATIVE
BLOOD, URINE: ABNORMAL
BUN BLDV-MCNC: 7 MG/DL (ref 7–20)
CALCIUM SERPL-MCNC: 9.3 MG/DL (ref 8.3–10.6)
CHLORIDE BLD-SCNC: 101 MMOL/L (ref 99–110)
CLARITY: CLEAR
CO2: 24 MMOL/L (ref 21–32)
COLOR: YELLOW
CREAT SERPL-MCNC: 0.7 MG/DL (ref 0.6–1.1)
EOSINOPHILS ABSOLUTE: 0.2 K/UL (ref 0–0.6)
EOSINOPHILS RELATIVE PERCENT: 1.7 %
EPITHELIAL CELLS, UA: ABNORMAL /HPF
GFR AFRICAN AMERICAN: >60
GFR NON-AFRICAN AMERICAN: >60
GLOBULIN: 4 G/DL
GLUCOSE BLD-MCNC: 95 MG/DL (ref 70–99)
GLUCOSE URINE: NEGATIVE MG/DL
HCG QUALITATIVE: NEGATIVE
HCT VFR BLD CALC: 37.2 % (ref 36–48)
HEMOGLOBIN: 12.5 G/DL (ref 12–16)
KETONES, URINE: NEGATIVE MG/DL
LEUKOCYTE ESTERASE, URINE: NEGATIVE
LIPASE: 49 U/L (ref 13–60)
LYMPHOCYTES ABSOLUTE: 3.2 K/UL (ref 1–5.1)
LYMPHOCYTES RELATIVE PERCENT: 28.8 %
MCH RBC QN AUTO: 28.8 PG (ref 26–34)
MCHC RBC AUTO-ENTMCNC: 33.7 G/DL (ref 31–36)
MCV RBC AUTO: 85.5 FL (ref 80–100)
MICROSCOPIC EXAMINATION: YES
MONOCYTES ABSOLUTE: 0.6 K/UL (ref 0–1.3)
MONOCYTES RELATIVE PERCENT: 5.5 %
MUCUS: ABNORMAL /LPF
NEUTROPHILS ABSOLUTE: 7 K/UL (ref 1.7–7.7)
NEUTROPHILS RELATIVE PERCENT: 63.3 %
NITRITE, URINE: NEGATIVE
PDW BLD-RTO: 13.9 % (ref 12.4–15.4)
PH UA: 6 (ref 5–8)
PLATELET # BLD: 354 K/UL (ref 135–450)
PMV BLD AUTO: 7.3 FL (ref 5–10.5)
POTASSIUM REFLEX MAGNESIUM: 3.9 MMOL/L (ref 3.5–5.1)
PROTEIN UA: NEGATIVE MG/DL
RBC # BLD: 4.35 M/UL (ref 4–5.2)
RBC UA: ABNORMAL /HPF (ref 0–2)
SODIUM BLD-SCNC: 137 MMOL/L (ref 136–145)
SPECIFIC GRAVITY UA: 1.02 (ref 1–1.03)
TOTAL PROTEIN: 8.4 G/DL (ref 6.4–8.2)
URINE REFLEX TO CULTURE: ABNORMAL
URINE TYPE: ABNORMAL
UROBILINOGEN, URINE: 0.2 E.U./DL
WBC # BLD: 11.1 K/UL (ref 4–11)
WBC UA: ABNORMAL /HPF (ref 0–5)

## 2019-10-28 PROCEDURE — 85025 COMPLETE CBC W/AUTO DIFF WBC: CPT

## 2019-10-28 PROCEDURE — 2500000003 HC RX 250 WO HCPCS: Performed by: EMERGENCY MEDICINE

## 2019-10-28 PROCEDURE — 36415 COLL VENOUS BLD VENIPUNCTURE: CPT

## 2019-10-28 PROCEDURE — 83690 ASSAY OF LIPASE: CPT

## 2019-10-28 PROCEDURE — 96374 THER/PROPH/DIAG INJ IV PUSH: CPT

## 2019-10-28 PROCEDURE — 99283 EMERGENCY DEPT VISIT LOW MDM: CPT

## 2019-10-28 PROCEDURE — 81001 URINALYSIS AUTO W/SCOPE: CPT

## 2019-10-28 PROCEDURE — 80053 COMPREHEN METABOLIC PANEL: CPT

## 2019-10-28 PROCEDURE — 6370000000 HC RX 637 (ALT 250 FOR IP): Performed by: EMERGENCY MEDICINE

## 2019-10-28 PROCEDURE — 84703 CHORIONIC GONADOTROPIN ASSAY: CPT

## 2019-10-28 RX ORDER — ACETAMINOPHEN 325 MG/1
325 TABLET ORAL ONCE
Status: COMPLETED | OUTPATIENT
Start: 2019-10-28 | End: 2019-10-28

## 2019-10-28 RX ORDER — OMEPRAZOLE 20 MG/1
40 CAPSULE, DELAYED RELEASE ORAL DAILY
Qty: 60 CAPSULE | Refills: 0 | Status: SHIPPED | OUTPATIENT
Start: 2019-10-28 | End: 2022-04-08 | Stop reason: CLARIF

## 2019-10-28 RX ADMIN — LIDOCAINE HYDROCHLORIDE: 20 SOLUTION ORAL; TOPICAL at 18:12

## 2019-10-28 RX ADMIN — ACETAMINOPHEN 325 MG: 325 TABLET ORAL at 18:12

## 2019-10-28 RX ADMIN — FAMOTIDINE 20 MG: 10 INJECTION, SOLUTION INTRAVENOUS at 18:12

## 2019-10-28 ASSESSMENT — PAIN DESCRIPTION - PAIN TYPE: TYPE: ACUTE PAIN

## 2019-10-28 ASSESSMENT — PAIN SCALES - GENERAL
PAINLEVEL_OUTOF10: 7
PAINLEVEL_OUTOF10: 7

## 2019-10-28 ASSESSMENT — PAIN DESCRIPTION - DESCRIPTORS: DESCRIPTORS: ACHING;BURNING

## 2019-10-28 ASSESSMENT — PAIN DESCRIPTION - LOCATION: LOCATION: ABDOMEN

## 2021-10-06 ENCOUNTER — HOSPITAL ENCOUNTER (OUTPATIENT)
Dept: PHYSICAL THERAPY | Age: 51
Setting detail: THERAPIES SERIES
Discharge: HOME | End: 2021-10-06
Payer: COMMERCIAL

## 2021-10-06 NOTE — FLOWSHEET NOTE
[address] [facility] ([facility])    Physical Therapy  Cancellation/No-show Note  Patient Name:  Zachary López  :  1970   Date:  10/6/2021    Cancelled visits to date: 0  No-shows to date: 1    For today's appointment patient:  []  Cancelled  []  Rescheduled appointment  [x]  No-show     Reason given by patient:  []  Patient ill  []  Conflicting appointment  []  No transportation    []  Conflict with work  [x]  No reason given  []  Other:     Comments:      Phone call information:   [x]  Phone call made today to patient. []  Patient answered, conversation as follows:    [x]  Patient did not answer. []  Phone call not needed - pt contacted us to cancel and provided reason for cancellation.      Electronically signed by:  Tori Cooper PT,

## 2022-03-23 ENCOUNTER — NURSE TRIAGE (OUTPATIENT)
Dept: OTHER | Facility: CLINIC | Age: 52
End: 2022-03-23

## 2022-03-23 NOTE — TELEPHONE ENCOUNTER
Received call from Lenny Taylor at [facility] with Red Flag Complaint. Subjective: Caller states \"Its been going on for a while and it is getting worse. Prior back surgery 8 years ago. \"     Current Symptoms: If she touches any part of her body it causes pain. Back pain radiates down her L leg. Pain in L hand from thumb to wrist.  SOB is getting worse. Put on weight in the past month. Loss of urine control. If not to the restroom for a BM she will go on herself. Gets out of breath walking down a hallway. SOB at rest at time of call. Chest feels tight and hurts with a deep breath. Onset: 3 weeks ago (SOB); worsening    Associated Symptoms: reduced activity    Pain Severity: 8/10; aching; constant, severe    Temperature: denies fever     What has been tried: Albuterol, Baths, Tylenol    LMP: NA Pregnant: NA    Recommended disposition: Go to ED Now    Care advice provided, patient verbalizes understanding; denies any other questions or concerns; instructed to call back for any new or worsening symptoms. Writer provided warm transfer to [facility] to get an appt in office as a new patient and second level triage. Would rather not go to ER. Reinforced importance of being seen in ER and she will discuss with  when he gets home from work. Attention Provider: Thank you for allowing me to participate in the care of your patient. The patient was connected to triage in response to information provided to the ECC/PSC. Please do not respond through this encounter as the response is not directed to a shared pool.     Reason for Disposition   MODERATE difficulty breathing (e.g., speaks in phrases, SOB even at rest, pulse 100-120) of new-onset or worse than normal    Protocols used: BREATHING DIFFICULTY-ADULT-OH

## 2022-03-25 ENCOUNTER — OFFICE VISIT (OUTPATIENT)
Dept: FAMILY MEDICINE CLINIC | Age: 52
End: 2022-03-25
Payer: COMMERCIAL

## 2022-03-25 VITALS
WEIGHT: 192 LBS | BODY MASS INDEX: 38.78 KG/M2 | OXYGEN SATURATION: 96 % | TEMPERATURE: 97.6 F | SYSTOLIC BLOOD PRESSURE: 138 MMHG | HEART RATE: 76 BPM | DIASTOLIC BLOOD PRESSURE: 86 MMHG

## 2022-03-25 DIAGNOSIS — M51.37 DDD (DEGENERATIVE DISC DISEASE), LUMBOSACRAL: ICD-10-CM

## 2022-03-25 DIAGNOSIS — R20.2 BILATERAL LEG PARESTHESIA: ICD-10-CM

## 2022-03-25 DIAGNOSIS — I10 ESSENTIAL HYPERTENSION: ICD-10-CM

## 2022-03-25 DIAGNOSIS — E55.9 VITAMIN D DEFICIENCY: ICD-10-CM

## 2022-03-25 DIAGNOSIS — R06.02 SHORTNESS OF BREATH: Primary | ICD-10-CM

## 2022-03-25 DIAGNOSIS — R73.03 PREDIABETES: ICD-10-CM

## 2022-03-25 DIAGNOSIS — M25.552 LEFT HIP PAIN: ICD-10-CM

## 2022-03-25 DIAGNOSIS — R32 URINARY INCONTINENCE, UNSPECIFIED TYPE: ICD-10-CM

## 2022-03-25 DIAGNOSIS — F41.9 ANXIETY: ICD-10-CM

## 2022-03-25 DIAGNOSIS — E66.9 OBESITY (BMI 30-39.9): ICD-10-CM

## 2022-03-25 LAB
BILIRUBIN, POC: NORMAL
BLOOD URINE, POC: NORMAL
CLARITY, POC: NORMAL
COLOR, POC: YELLOW
GLUCOSE URINE, POC: NORMAL
KETONES, POC: NORMAL
LEUKOCYTE EST, POC: NORMAL
NITRITE, POC: NORMAL
PH, POC: 5.5
PROTEIN, POC: NORMAL
SPECIFIC GRAVITY, POC: 1.02
UROBILINOGEN, POC: 0.2

## 2022-03-25 PROCEDURE — 3017F COLORECTAL CA SCREEN DOC REV: CPT | Performed by: NURSE PRACTITIONER

## 2022-03-25 PROCEDURE — G8427 DOCREV CUR MEDS BY ELIG CLIN: HCPCS | Performed by: NURSE PRACTITIONER

## 2022-03-25 PROCEDURE — 36415 COLL VENOUS BLD VENIPUNCTURE: CPT | Performed by: NURSE PRACTITIONER

## 2022-03-25 PROCEDURE — 81002 URINALYSIS NONAUTO W/O SCOPE: CPT | Performed by: NURSE PRACTITIONER

## 2022-03-25 PROCEDURE — 99204 OFFICE O/P NEW MOD 45 MIN: CPT | Performed by: NURSE PRACTITIONER

## 2022-03-25 PROCEDURE — 1036F TOBACCO NON-USER: CPT | Performed by: NURSE PRACTITIONER

## 2022-03-25 PROCEDURE — 93000 ELECTROCARDIOGRAM COMPLETE: CPT | Performed by: NURSE PRACTITIONER

## 2022-03-25 PROCEDURE — G8484 FLU IMMUNIZE NO ADMIN: HCPCS | Performed by: NURSE PRACTITIONER

## 2022-03-25 PROCEDURE — G8417 CALC BMI ABV UP PARAM F/U: HCPCS | Performed by: NURSE PRACTITIONER

## 2022-03-25 RX ORDER — DULOXETIN HYDROCHLORIDE 30 MG/1
30 CAPSULE, DELAYED RELEASE ORAL DAILY
Qty: 90 CAPSULE | Refills: 1 | Status: SHIPPED | OUTPATIENT
Start: 2022-03-25 | End: 2022-04-08 | Stop reason: SINTOL

## 2022-03-25 ASSESSMENT — ENCOUNTER SYMPTOMS
ABDOMINAL PAIN: 1
SHORTNESS OF BREATH: 1
WHEEZING: 0
RHINORRHEA: 0
SPUTUM PRODUCTION: 0
HEMOPTYSIS: 0
VOMITING: 0
SWOLLEN GLANDS: 0
SORE THROAT: 0
ORTHOPNEA: 0

## 2022-03-25 ASSESSMENT — PATIENT HEALTH QUESTIONNAIRE - PHQ9
3. TROUBLE FALLING OR STAYING ASLEEP: 3
9. THOUGHTS THAT YOU WOULD BE BETTER OFF DEAD, OR OF HURTING YOURSELF: 0
10. IF YOU CHECKED OFF ANY PROBLEMS, HOW DIFFICULT HAVE THESE PROBLEMS MADE IT FOR YOU TO DO YOUR WORK, TAKE CARE OF THINGS AT HOME, OR GET ALONG WITH OTHER PEOPLE: 3
5. POOR APPETITE OR OVEREATING: 2
2. FEELING DOWN, DEPRESSED OR HOPELESS: 3
6. FEELING BAD ABOUT YOURSELF - OR THAT YOU ARE A FAILURE OR HAVE LET YOURSELF OR YOUR FAMILY DOWN: 2
SUM OF ALL RESPONSES TO PHQ9 QUESTIONS 1 & 2: 4
1. LITTLE INTEREST OR PLEASURE IN DOING THINGS: 1
SUM OF ALL RESPONSES TO PHQ QUESTIONS 1-9: 16
7. TROUBLE CONCENTRATING ON THINGS, SUCH AS READING THE NEWSPAPER OR WATCHING TELEVISION: 1
8. MOVING OR SPEAKING SO SLOWLY THAT OTHER PEOPLE COULD HAVE NOTICED. OR THE OPPOSITE, BEING SO FIGETY OR RESTLESS THAT YOU HAVE BEEN MOVING AROUND A LOT MORE THAN USUAL: 1
4. FEELING TIRED OR HAVING LITTLE ENERGY: 3
SUM OF ALL RESPONSES TO PHQ QUESTIONS 1-9: 16

## 2022-03-25 NOTE — PROGRESS NOTES
daily physical exercise as tolearted  Encouraged portion control, mixture of protein, carbohydrates, fruits/ veggies. Encouraged Sleep Hygiene    - DULoxetine (CYMBALTA) 30 MG extended release capsule; Take 1 capsule by mouth daily  Dispense: 90 capsule; Refill: 1    9. Vitamin D deficiency  Labs today  - Vitamin D 25 Hydroxy    10. Obesity (BMI 30-39. 9)  Monitor. Return in about 2 weeks (around 4/8/2022). Presenting today to establish care. Shortness of Breath  This is a recurrent problem. Episode onset: 3 weeks ago. The problem occurs constantly. The problem has been unchanged. Associated symptoms include abdominal pain (epigastric), chest pain (\"tightness\"), headaches (bilateral temporal), leg pain (bilateral anterior thighs), leg swelling, neck pain (right sided down to shoulder) and syncope (dizziness). Pertinent negatives include no claudication, coryza, ear pain, fever, hemoptysis, orthopnea, PND, rash, rhinorrhea, sore throat, sputum production, swollen glands, vomiting or wheezing. The symptoms are aggravated by emotional upset and lying flat (walking). The patient has no known risk factors for DVT/PE. She has tried beta agonist inhalers for the symptoms. The treatment provided no relief. hx anxiety attacks. Recent vision change- white dots. Family history asthma. Mother had history of CAD. Prior smoker, 13 pack year history. No recent URI/ COVID. No hx asthma/ COPD    Concerned about decreased strength on entire left side. Prior brain mri.  ? TIA.  ? Microvessel disease. Also severe pain on left side extremities. No prior Neuro eval.  Not on statins for TIA. No recent labs. No prior carpel tunnel release. Hx urinary frequency, occurred after back surgery. Had 2 cystoscopy. Likely would need  Bladder tacking. Hx breast cysts, folllowed by dr. Elizabet Silva. Hx prior prediabetes. Stopped all medications due to lack of insurance. No prior colonosocpy.   No melena/ hematochezia. No abdominal cramping    Lab Results   Component Value Date     03/25/2022    K 5.1 03/25/2022     03/25/2022    CO2 23 03/25/2022    BUN 12 03/25/2022    CREATININE 0.7 03/25/2022    GLUCOSE 90 03/25/2022    CALCIUM 10.2 03/25/2022    PROT 7.9 03/25/2022    LABALBU 4.8 03/25/2022    BILITOT <0.2 03/25/2022    ALKPHOS 124 03/25/2022    AST 20 03/25/2022    ALT 18 03/25/2022    LABGLOM >60 03/25/2022    GFRAA >60 03/25/2022    AGRATIO 1.5 03/25/2022    GLOB 4.0 10/28/2019         Review of Systems   Constitutional: Negative for fever. HENT: Negative for ear pain, rhinorrhea and sore throat. Respiratory: Positive for shortness of breath. Negative for hemoptysis, sputum production and wheezing. Cardiovascular: Positive for chest pain (\"tightness\"), leg swelling and syncope (dizziness). Negative for orthopnea, claudication and PND. Gastrointestinal: Positive for abdominal pain (epigastric). Negative for vomiting. Musculoskeletal: Positive for neck pain (right sided down to shoulder). Skin: Negative for rash. Neurological: Positive for headaches (bilateral temporal). Current Outpatient Medications on File Prior to Visit   Medication Sig Dispense Refill    albuterol sulfate HFA (PROAIR HFA) 108 (90 Base) MCG/ACT inhaler Inhale 2 puffs into the lungs every 6 hours as needed for Wheezing 1 Inhaler 3    omeprazole (PRILOSEC) 20 MG delayed release capsule Take 2 capsules by mouth Daily 60 capsule 0    naproxen (NAPROSYN) 500 MG tablet Take 1 tablet by mouth 2 times daily (with meals) for 7 days 14 tablet 0     No current facility-administered medications on file prior to visit.      No Known Allergies  Past Medical History:   Diagnosis Date    Anxiety     Chronic back pain     Cyst (solitary) of breast     RIGHT BREAST    Depression     Hypertension      Past Surgical History:   Procedure Laterality Date    BACK SURGERY  11/30/2016    L3/L4, L4/5 due to injury at work, hx STNA, s/p lumbar cage    CARPAL TUNNEL RELEASE Right     2012    CHOLECYSTECTOMY  2005    EYE SURGERY  1983    right eye tumors    HYSTERECTOMY  2000    TUBAL LIGATION       Social History     Tobacco Use    Smoking status: Former Smoker     Quit date: 3/6/2009     Years since quittin.0    Smokeless tobacco: Never Used   Substance Use Topics    Alcohol use: No     Comment: 1 beer a month     Family History   Problem Relation Age of Onset    Heart Disease Mother     High Blood Pressure Mother     Heart Disease Father     High Blood Pressure Father     Heart Disease Paternal Grandfather        Vitals:    22 1452   BP: 138/86   Pulse: 76   Temp: 97.6 °F (36.4 °C)   TempSrc: Infrared   SpO2: 96%   Weight: 192 lb (87.1 kg)     Estimated body mass index is 38.78 kg/m² as calculated from the following:    Height as of 10/28/19: 4' 11\" (1.499 m). Weight as of this encounter: 192 lb (87.1 kg). NM Myocardial SPECT 7/10/2015  Findings: The patient received LexiScan for the stress portion of   the test.       After the injection of radiopharmaceutical, routine imaging was   performed. Regan Celestinis is no stress-induced perfusion defect.  No wall   motion abnormality.  Ejection fraction calculates to 72 %. No scar   formation demonstrated.            Impression   Impression:       1. No scintigraphic evidence of stress induced ischemia.        MRI Sacrum/ SI Joint WO Contrast 2016  FINDINGS:   BONE MARROW: Bone marrow signal intensities are maintained.  No evidence for   occult fracture.  No stress related change.  No suspicious focal bony lesions.       JOINTS:  No significant degenerative changes to the SI joints.  No evidence   for sacroiliitis.       SOFT TISSUES:  Muscle signal intensities are maintained.  Tendons appear   intact.  No acute or suspicious abnormalities to visualized intrapelvic   contents.           Impression   IMPRESSION:   Unremarkable exam.         MRI Lumbar Spine 5/19/2016  FINDINGS:   BONES/ALIGNMENT: There is normal alignment of the lumbar spine.  There is no   acute fracture or listhesis.  Bone marrow signal intensity is normal.  There   is disc desiccation and mild disc space narrowing at L5-S1, unchanged.  There   is no spondylolysis.       SPINAL CORD: The conus medullaris is normal in size and signal intensities   and terminates normally.       SOFT TISSUES: There is no paraspinal mass identified.       L1-L2: There is no disc bulge or protrusion present.  There is no significant   spinal canal stenosis or neural foraminal narrowing present.       L2-L3: There is no disc bulge or protrusion present.  There is no significant   spinal canal stenosis or neural foraminal narrowing present.       L3-L4: There is no disc bulge or protrusion present.  There is no significant   spinal canal stenosis or neural foraminal narrowing present.  There is mild   bilateral facet arthropathy.       L4-L5: There is bilateral facet arthropathy.  There is resulting mild left   neural foraminal narrowing.  No significant spinal canal stenosis or right   neural foraminal narrowing is present.  Fluid is noted within the facet   joints.       L5-S1: There is a broad 2 mm left foraminal disc protrusion.  This mildly   narrows the left neural foramen.  No significant spinal canal stenosis or   right neural foraminal narrowing is present.           Impression   IMPRESSION:   1. Severe degenerative facet arthropathy at L4-5 resulting in mild left   neural foraminal narrowing.  Fluid is noted within the facet joints, similar   to the previous exam.  The overall appearance is not significantly changed. 2. Broad 2 mm left foraminal disc protrusion at L5-S1 mildly narrowing the   left neural foramen, similar to the previous exam.         Physical Exam  Vitals reviewed. Constitutional:       Appearance: Normal appearance. HENT:      Head: Normocephalic. Neck:      Vascular: No carotid bruit. Cardiovascular:      Rate and Rhythm: Normal rate and regular rhythm. Pulses: Normal pulses. Carotid pulses are 2+ on the right side and 2+ on the left side. Dorsalis pedis pulses are 2+ on the right side and 2+ on the left side. Posterior tibial pulses are 2+ on the right side and 2+ on the left side. Heart sounds: Normal heart sounds. No murmur heard. No gallop. Pulmonary:      Effort: Pulmonary effort is normal.      Breath sounds: Normal breath sounds. No wheezing or rhonchi. Abdominal:      General: Abdomen is flat. Palpations: Abdomen is soft. Tenderness: There is no abdominal tenderness. Hernia: No hernia is present. Musculoskeletal:         General: Normal range of motion. Cervical back: Normal range of motion. Right lower leg: No edema. Left lower leg: No edema. Lymphadenopathy:      Cervical: No cervical adenopathy. Skin:     General: Skin is warm and dry. Capillary Refill: Capillary refill takes less than 2 seconds. Neurological:      General: No focal deficit present. Mental Status: She is alert and oriented to person, place, and time. GCS: GCS eye subscore is 4. GCS verbal subscore is 5. GCS motor subscore is 6. Cranial Nerves: Cranial nerves are intact. Sensory: Sensation is intact. Motor: Motor function is intact. Coordination: Coordination is intact. Gait: Gait is intact. Deep Tendon Reflexes:      Reflex Scores:       Bicep reflexes are 2+ on the right side and 2+ on the left side. Patellar reflexes are 2+ on the right side and 2+ on the left side. Psychiatric:         Mood and Affect: Mood normal.         Behavior: Behavior normal.           Patient's questions answered and concerns addressed. Patient agrees to plan of care.         Electronically signed by KARINA Rodriges CNP on 3/30/2022 at 10:13 PM

## 2022-03-26 LAB
A/G RATIO: 1.5 (ref 1.1–2.2)
ALBUMIN SERPL-MCNC: 4.8 G/DL (ref 3.4–5)
ALP BLD-CCNC: 124 U/L (ref 40–129)
ALT SERPL-CCNC: 18 U/L (ref 10–40)
ANION GAP SERPL CALCULATED.3IONS-SCNC: 16 MMOL/L (ref 3–16)
AST SERPL-CCNC: 20 U/L (ref 15–37)
BILIRUB SERPL-MCNC: <0.2 MG/DL (ref 0–1)
BUN BLDV-MCNC: 12 MG/DL (ref 7–20)
CALCIUM SERPL-MCNC: 10.2 MG/DL (ref 8.3–10.6)
CHLORIDE BLD-SCNC: 102 MMOL/L (ref 99–110)
CHOLESTEROL, TOTAL: 196 MG/DL (ref 0–199)
CO2: 23 MMOL/L (ref 21–32)
CREAT SERPL-MCNC: 0.7 MG/DL (ref 0.6–1.1)
CREATININE URINE: 108.3 MG/DL (ref 28–259)
ESTIMATED AVERAGE GLUCOSE: 116.9 MG/DL
FERRITIN: 110.5 NG/ML (ref 15–150)
FOLATE: 8.41 NG/ML (ref 4.78–24.2)
GFR AFRICAN AMERICAN: >60
GFR NON-AFRICAN AMERICAN: >60
GLUCOSE BLD-MCNC: 90 MG/DL (ref 70–99)
HBA1C MFR BLD: 5.7 %
HDLC SERPL-MCNC: 39 MG/DL (ref 40–60)
LDL CHOLESTEROL CALCULATED: 113 MG/DL
MICROALBUMIN UR-MCNC: <1.2 MG/DL
MICROALBUMIN/CREAT UR-RTO: NORMAL MG/G (ref 0–30)
POTASSIUM SERPL-SCNC: 5.1 MMOL/L (ref 3.5–5.1)
SODIUM BLD-SCNC: 141 MMOL/L (ref 136–145)
TOTAL PROTEIN: 7.9 G/DL (ref 6.4–8.2)
TRIGL SERPL-MCNC: 221 MG/DL (ref 0–150)
TSH REFLEX: 1.65 UIU/ML (ref 0.27–4.2)
VITAMIN B-12: 450 PG/ML (ref 211–911)
VITAMIN D 25-HYDROXY: 29.4 NG/ML
VLDLC SERPL CALC-MCNC: 44 MG/DL

## 2022-03-28 ENCOUNTER — PATIENT MESSAGE (OUTPATIENT)
Dept: FAMILY MEDICINE CLINIC | Age: 52
End: 2022-03-28

## 2022-03-28 RX ORDER — ALBUTEROL SULFATE 90 UG/1
2 AEROSOL, METERED RESPIRATORY (INHALATION) EVERY 6 HOURS PRN
Qty: 18 G | Refills: 3 | Status: SHIPPED | OUTPATIENT
Start: 2022-03-28 | End: 2022-04-08 | Stop reason: ALTCHOICE

## 2022-03-28 NOTE — TELEPHONE ENCOUNTER
Patient wanted me to add that cymbalta made her very nauseated, dizzy, and tired. Also, very thirsty but she couldn't keep anything down due to the nausea.

## 2022-03-28 NOTE — RESULT ENCOUNTER NOTE
Cholesterol slightly increased but no need for medication at this time. Normal kidney/ liver function. Normal Ferritin level. Normal thyroid function. Slightly low Vitamin d level. Diabetes is well controlled, no need for medication. No protein in your urine. Please start taking Vitamin D 2000 IU daily for the vitamin D    Overall good report today. No clear cause for why you are having the symptoms you are having from these labs. We will continue to look.

## 2022-03-28 NOTE — RESULT ENCOUNTER NOTE
Normal chest Xray. Did you want to try an Albuterol inhaler for your shortness of breat while we are completing the work up?

## 2022-03-28 NOTE — TELEPHONE ENCOUNTER
From: Krish Parmar A Fist  To: Moy Lester  Sent: 3/28/2022 1:33 PM EDT  Subject: Question regarding MICROALBUMIN/CREAT URINE RATIO    I just dont understand with the pain in my legs. Simeon Case and why Im so tired and dizzy. Well stree cause all that?

## 2022-04-08 ENCOUNTER — OFFICE VISIT (OUTPATIENT)
Dept: FAMILY MEDICINE CLINIC | Age: 52
End: 2022-04-08
Payer: COMMERCIAL

## 2022-04-08 VITALS
TEMPERATURE: 97.5 F | DIASTOLIC BLOOD PRESSURE: 72 MMHG | HEART RATE: 65 BPM | OXYGEN SATURATION: 98 % | SYSTOLIC BLOOD PRESSURE: 124 MMHG | WEIGHT: 195 LBS | BODY MASS INDEX: 39.39 KG/M2

## 2022-04-08 DIAGNOSIS — G89.29 CHRONIC BILATERAL LOW BACK PAIN WITH BILATERAL SCIATICA: Primary | ICD-10-CM

## 2022-04-08 DIAGNOSIS — M54.42 CHRONIC BILATERAL LOW BACK PAIN WITH BILATERAL SCIATICA: Primary | ICD-10-CM

## 2022-04-08 DIAGNOSIS — M25.552 BILATERAL HIP PAIN: ICD-10-CM

## 2022-04-08 DIAGNOSIS — R20.2 BILATERAL LEG PARESTHESIA: ICD-10-CM

## 2022-04-08 DIAGNOSIS — M54.2 CHRONIC NECK PAIN: ICD-10-CM

## 2022-04-08 DIAGNOSIS — M25.551 BILATERAL HIP PAIN: ICD-10-CM

## 2022-04-08 DIAGNOSIS — M25.50 CHRONIC PAIN OF MULTIPLE JOINTS: ICD-10-CM

## 2022-04-08 DIAGNOSIS — M54.41 CHRONIC BILATERAL LOW BACK PAIN WITH BILATERAL SCIATICA: Primary | ICD-10-CM

## 2022-04-08 DIAGNOSIS — G89.29 CHRONIC NECK PAIN: ICD-10-CM

## 2022-04-08 DIAGNOSIS — R06.02 SHORTNESS OF BREATH: ICD-10-CM

## 2022-04-08 DIAGNOSIS — M51.37 DDD (DEGENERATIVE DISC DISEASE), LUMBOSACRAL: ICD-10-CM

## 2022-04-08 DIAGNOSIS — G89.29 CHRONIC PAIN OF MULTIPLE JOINTS: ICD-10-CM

## 2022-04-08 PROCEDURE — 1036F TOBACCO NON-USER: CPT | Performed by: NURSE PRACTITIONER

## 2022-04-08 PROCEDURE — G8427 DOCREV CUR MEDS BY ELIG CLIN: HCPCS | Performed by: NURSE PRACTITIONER

## 2022-04-08 PROCEDURE — 3017F COLORECTAL CA SCREEN DOC REV: CPT | Performed by: NURSE PRACTITIONER

## 2022-04-08 PROCEDURE — 99214 OFFICE O/P EST MOD 30 MIN: CPT | Performed by: NURSE PRACTITIONER

## 2022-04-08 PROCEDURE — 36415 COLL VENOUS BLD VENIPUNCTURE: CPT | Performed by: NURSE PRACTITIONER

## 2022-04-08 PROCEDURE — G8417 CALC BMI ABV UP PARAM F/U: HCPCS | Performed by: NURSE PRACTITIONER

## 2022-04-08 RX ORDER — LEVALBUTEROL TARTRATE 45 UG/1
1 AEROSOL, METERED ORAL EVERY 6 HOURS PRN
Qty: 1 EACH | Refills: 1 | Status: SHIPPED | OUTPATIENT
Start: 2022-04-08 | End: 2023-04-08

## 2022-04-08 RX ORDER — AMITRIPTYLINE HYDROCHLORIDE 10 MG/1
10 TABLET, FILM COATED ORAL NIGHTLY
Qty: 30 TABLET | Refills: 0 | Status: SHIPPED | OUTPATIENT
Start: 2022-04-08 | End: 2022-05-06 | Stop reason: DRUGHIGH

## 2022-04-08 ASSESSMENT — ENCOUNTER SYMPTOMS
SHORTNESS OF BREATH: 1
HEMOPTYSIS: 0
ABDOMINAL PAIN: 1
WHEEZING: 0
ORTHOPNEA: 0
VOMITING: 0
SPUTUM PRODUCTION: 0
RHINORRHEA: 0
SORE THROAT: 0

## 2022-04-08 NOTE — PROGRESS NOTES
4/8/2022    Chief Complaint   Patient presents with    Anxiety     states the cymbalta made he sick and not able to eat or drink    Shortness of Breath     states chest still feels tight     Pain    Dizziness       Leatha Carter is a 47 y.o. female, presents today for:      ASSESSMENT/PLAN:  1. Chronic bilateral low back pain with bilateral sciatica  Continues to be moderate- severe in nature. Continue to have concern for neurogenic claudication as previously noted by [facility] especially given that patient is now having primarily left sided pain vs right sided 6 months ago with prior L4-5 and L5-S1 ALIF with posterior pedicle fixation with Dr. Doug Castro at [facility] in 2016. Will order updated back/ hip XRays today then attempt MRI again  No improvement with chiropractic care. No recent PT  Prior meds: Gabapentin (mild relief), Cymbalta (side effects)  ADDENDUM 4/25/22: Lumbar and Hip XRays showings no occult fracture. Will attempt to order Lumbar MRI today. Autoimmune w/u negative 4/8/22    2. DDD (degenerative disc disease), lumbosacral  See above  - XR HIP BILATERAL W AP PELVIS (2 VIEWS); Future  - XR LUMBAR SPINE (2-3 VIEWS); Future    3. Chronic neck pain  C-spine Xray, EMG 4/17/ 19 showing right ulnar neuropathy  Will discuss further next visit  - XR CERVICAL SPINE (4-5 VIEWS); Future    4. Chronic pain of multiple joints  Start Autoimmune w/u today  Trial Amitriptyline  Prior Meds: Gabapentin (mild relief), Cymbalta (SE: Nausea)  - amitriptyline (ELAVIL) 10 MG tablet; Take 1 tablet by mouth nightly  Dispense: 30 tablet; Refill: 0  - GAVINO  - C-Reactive Protein  - Sedimentation Rate  - Uric Acid  - Rheumatoid Factor    5. Shortness of breath  Trial Xopenex due to palpitation  Encouraged to obtain CXR as previously ordered   PFT ordered to discen possible cause  - Full PFT Study With Bronchodilator; Future  - levalbuterol (XOPENEX HFA) 45 MCG/ACT inhaler;  Inhale 1 puff into the lungs every 6 hours as needed for Wheezing or Shortness of Breath  Dispense: 1 each; Refill: 1    6. Bilateral hip pain  See above    7. Bilateral leg paresthesia  See above    Return in about 4 weeks (around 5/6/2022). Presenting today to follow up on chronic issues. Chronic Back Pain: Continues to have moderate to severe chronic neck and back with left sided raddiculopathy and bilateral leg parasthesias. Primarily located on left side of back, bilateral legs wrapping around to anterior thigh, and her feet. No recent falls. Autoimmune w/u has been negative. Attempted chiropractic care in 2021 without resolution of symptoms. Prior meds: Gabapentin with little relief and Cymbalta with severe nausea. No recent back or neck imaging. Last MRI was 6/2016. Long standing history of back pain resulting in 2016 a L4-5 and L5-S1 ALIF with posterior pedicle screw fixation with Dr. Juan Miguel Moody at [facility]. Pain worse with standing, walking, and laying flat. Evaluated by [facility] 9/2021 who were concerned about neurogenic claudication with Lumbar MRI WO Contrast ordered however was denied by insurance. Concerned about decreased strength on entire left side. Prior brain mri.  ? TIA.  ? Microvessel disease. Also severe pain on left side extremities. No prior Neuro eval.  Not on statins for TIA. No recent labs. No prior carpel tunnel release. Chronic Neck Pain: Primarily left sided with radiculopathy down to left hand. Reports decreased strength on left arm resulting in decreased ability to lift and hold objects >25 lbs for extended periods of time. Last imaging CT Spine WO Contrast 7/2019 showing no acute osseous abnormality. No prior MRI. EMG 4/19 showing right ulnar neuropathy. Shortness of Breath: Mild improvement with Albuterol inhaler. Did not obtain CXR as recommended. This is a recurrent problem. Episode onset: 3 weeks ago. The problem occurs constantly. The problem has been unchanged.  Associated symptoms include abdominal pain (epigastric), chest pain (\"tightness\"), headaches (bilateral temporal), leg pain (bilateral anterior thighs), leg swelling, neck pain (right sided down to shoulder) and syncope (dizziness). Pertinent negatives include no claudication, coryza, ear pain, fever, hemoptysis, orthopnea, PND, rash, rhinorrhea, sore throat, sputum production, swollen glands, vomiting or wheezing. The symptoms are aggravated by emotional upset and lying flat (walking). The patient has no known risk factors for DVT/PE. She has tried beta agonist inhalers for the symptoms. The treatment provided no relief. PHQ Scores 3/25/2022 4/15/2019 4/26/2018   PHQ2 Score 4 2 6   PHQ9 Score 16 2 24     Interpretation of Total Score Depression Severity: 1-4 = Minimal depression, 5-9 = Mild depression, 10-14 = Moderate depression, 15-19 = Moderately severe depression, 20-27 = Severe depression    No flowsheet data found. Interpretation of JOSETTE-7 score: 5-9 = mild anxiety, 10-14 = moderate anxiety, 15+ = severe anxiety. Recommend referral to behavioral health for scores 10 or greater. Lab Results   Component Value Date     03/25/2022    K 5.1 03/25/2022     03/25/2022    CO2 23 03/25/2022    BUN 12 03/25/2022    CREATININE 0.7 03/25/2022    GLUCOSE 90 03/25/2022    CALCIUM 10.2 03/25/2022    PROT 7.9 03/25/2022    LABALBU 4.8 03/25/2022    BILITOT <0.2 03/25/2022    ALKPHOS 124 03/25/2022    AST 20 03/25/2022    ALT 18 03/25/2022    LABGLOM >60 03/25/2022    GFRAA >60 03/25/2022    AGRATIO 1.5 03/25/2022    GLOB 4.0 10/28/2019         Review of Systems   Constitutional: Negative for fever. HENT: Negative for ear pain, rhinorrhea and sore throat. Respiratory: Positive for shortness of breath. Negative for hemoptysis, sputum production and wheezing. Cardiovascular: Positive for chest pain (\"tightness\"), leg swelling and syncope (dizziness). Negative for orthopnea, claudication and PND.    Gastrointestinal: Positive for abdominal pain (epigastric). Negative for vomiting. Musculoskeletal: Positive for neck pain (right sided down to shoulder). Skin: Negative for rash. Neurological: Positive for headaches (bilateral temporal). No current outpatient medications on file prior to visit. No current facility-administered medications on file prior to visit. Allergies   Allergen Reactions    Cymbalta [Duloxetine Hcl] Nausea Only    Albuterol Palpitations     Past Medical History:   Diagnosis Date    Anxiety     Chronic back pain     Cyst (solitary) of breast     RIGHT BREAST    Depression     Hypertension      Past Surgical History:   Procedure Laterality Date    BACK SURGERY  2016    L3/L4, L4/5 due to injury at work, hx STNA, s/p lumbar cage    CARPAL TUNNEL RELEASE Right     2012    CHOLECYSTECTOMY  2005   [facility]    right eye tumors    HYSTERECTOMY      TUBAL LIGATION       Social History     Tobacco Use    Smoking status: Former Smoker     Quit date: 3/6/2009     Years since quittin.1    Smokeless tobacco: Never Used   Substance Use Topics    Alcohol use: No     Comment: 1 beer a month     Family History   Problem Relation Age of Onset    Heart Disease Mother     High Blood Pressure Mother     Heart Disease Father     High Blood Pressure Father     Heart Disease Paternal Grandfather        Vitals:    22 1257   BP: 124/72   Pulse: 65   Temp: 97.5 °F (36.4 °C)   TempSrc: Infrared   SpO2: 98%   Weight: 195 lb (88.5 kg)     Estimated body mass index is 39.39 kg/m² as calculated from the following:    Height as of 10/28/19: 4' 11\" (1.499 m). Weight as of this encounter: 195 lb (88.5 kg). NM Myocardial SPECT 7/10/2015  Findings: The patient received LexiScan for the stress portion of   the test.       After the injection of radiopharmaceutical, routine imaging was   performed. Jessea Leaven is no stress-induced perfusion defect.  No wall   motion abnormality.  Ejection fraction calculates to 72 %. No scar   formation demonstrated.            Impression   Impression:       1. No scintigraphic evidence of stress induced ischemia.        MRI Sacrum/ SI Joint WO Contrast 6/16/2016  FINDINGS:   BONE MARROW: Bone marrow signal intensities are maintained.  No evidence for   occult fracture.  No stress related change.  No suspicious focal bony lesions.       JOINTS:  No significant degenerative changes to the SI joints.  No evidence   for sacroiliitis.       SOFT TISSUES:  Muscle signal intensities are maintained.  Tendons appear   intact.  No acute or suspicious abnormalities to visualized intrapelvic   contents.           Impression   IMPRESSION:   Unremarkable exam.         MRI Lumbar Spine 5/19/2016  FINDINGS:   BONES/ALIGNMENT: There is normal alignment of the lumbar spine.  There is no   acute fracture or listhesis.  Bone marrow signal intensity is normal.  There   is disc desiccation and mild disc space narrowing at L5-S1, unchanged.  There   is no spondylolysis.       SPINAL CORD: The conus medullaris is normal in size and signal intensities   and terminates normally.       SOFT TISSUES: There is no paraspinal mass identified.       L1-L2: There is no disc bulge or protrusion present.  There is no significant   spinal canal stenosis or neural foraminal narrowing present.       L2-L3: There is no disc bulge or protrusion present.  There is no significant   spinal canal stenosis or neural foraminal narrowing present.       L3-L4: There is no disc bulge or protrusion present.  There is no significant   spinal canal stenosis or neural foraminal narrowing present.  There is mild   bilateral facet arthropathy.       L4-L5: There is bilateral facet arthropathy.  There is resulting mild left   neural foraminal narrowing.  No significant spinal canal stenosis or right   neural foraminal narrowing is present.  Fluid is noted within the facet   joints.       L5-S1: There is a broad 2 mm left foraminal disc protrusion.  This mildly   narrows the left neural foramen.  No significant spinal canal stenosis or   right neural foraminal narrowing is present.           Impression   IMPRESSION:   1. Severe degenerative facet arthropathy at L4-5 resulting in mild left   neural foraminal narrowing.  Fluid is noted within the facet joints, similar   to the previous exam.  The overall appearance is not significantly changed. 2. Broad 2 mm left foraminal disc protrusion at L5-S1 mildly narrowing the   left neural foramen, similar to the previous exam.         Physical Exam  Vitals reviewed. Constitutional:       Appearance: Normal appearance. HENT:      Head: Normocephalic. Neck:      Vascular: No carotid bruit. Cardiovascular:      Rate and Rhythm: Normal rate and regular rhythm. Pulses: Normal pulses. Carotid pulses are 2+ on the right side and 2+ on the left side. Dorsalis pedis pulses are 2+ on the right side and 2+ on the left side. Posterior tibial pulses are 2+ on the right side and 2+ on the left side. Heart sounds: Normal heart sounds. No murmur heard. No gallop. Pulmonary:      Effort: Pulmonary effort is normal.      Breath sounds: Normal breath sounds. No wheezing or rhonchi. Abdominal:      General: Abdomen is flat. Palpations: Abdomen is soft. Tenderness: There is no abdominal tenderness. Hernia: No hernia is present. Musculoskeletal:         General: Normal range of motion. Cervical back: Normal range of motion. Right lower leg: No edema. Left lower leg: No edema. Lymphadenopathy:      Cervical: No cervical adenopathy. Skin:     General: Skin is warm and dry. Capillary Refill: Capillary refill takes less than 2 seconds. Neurological:      General: No focal deficit present. Mental Status: She is alert and oriented to person, place, and time. GCS: GCS eye subscore is 4.  GCS verbal subscore is 5. GCS motor subscore is 6. Cranial Nerves: Cranial nerves are intact. Sensory: Sensation is intact. Motor: Motor function is intact. Coordination: Coordination is intact. Gait: Gait is intact. Deep Tendon Reflexes:      Reflex Scores:       Bicep reflexes are 2+ on the right side and 2+ on the left side. Patellar reflexes are 2+ on the right side and 2+ on the left side. Psychiatric:         Mood and Affect: Mood normal.         Behavior: Behavior normal.           Patient's questions answered and concerns addressed. Patient agrees to plan of care.         Electronically signed by KARINA Encinas CNP on 4/25/2022 at 7:13 AM

## 2022-04-08 NOTE — PROGRESS NOTES
Blood drawn per order. Needle size: 23 g butterfly  Site: L Antecubital.  First attempt successful Yes    Second attempt na    Pressure applied until bleeding stopped. Cotton ball and band aid  applied. Patient informed to call office or return if bleeding reoccurs and unable to stop.     Tubes drawn: 2 purple     3 red

## 2022-04-09 LAB
ANTI-NUCLEAR ANTIBODY (ANA): NEGATIVE
C-REACTIVE PROTEIN: 12.8 MG/L (ref 0–5.1)
RHEUMATOID FACTOR: <10 IU/ML
SEDIMENTATION RATE, ERYTHROCYTE: 37 MM/HR (ref 0–30)
URIC ACID, SERUM: 6 MG/DL (ref 2.6–6)

## 2022-04-13 NOTE — RESULT ENCOUNTER NOTE
Normal hip and pelvis Xrays as expected. Remember there maybe something else going on that is the cause for your pain= we will continue to discuss this. Please call if you have additional questions and/ or concerns. Please keep your next appt. Thank you.

## 2022-04-13 NOTE — RESULT ENCOUNTER NOTE
Normal cervical spine Xrays. Remember that even though Xrays are normal it doesn't mean there are not other things going on that we will explore. Please call if you have additional questions and/ or concerns. Please keep your next appt. Thank you.

## 2022-05-06 ENCOUNTER — HOSPITAL ENCOUNTER (OUTPATIENT)
Dept: MRI IMAGING | Age: 52
Discharge: HOME | End: 2022-05-06
Payer: COMMERCIAL

## 2022-05-06 ENCOUNTER — OFFICE VISIT (OUTPATIENT)
Dept: FAMILY MEDICINE CLINIC | Age: 52
End: 2022-05-06
Payer: COMMERCIAL

## 2022-05-06 VITALS
TEMPERATURE: 97.6 F | DIASTOLIC BLOOD PRESSURE: 82 MMHG | HEART RATE: 65 BPM | OXYGEN SATURATION: 98 % | WEIGHT: 193 LBS | BODY MASS INDEX: 38.98 KG/M2 | SYSTOLIC BLOOD PRESSURE: 122 MMHG

## 2022-05-06 DIAGNOSIS — M25.50 CHRONIC PAIN OF MULTIPLE JOINTS: ICD-10-CM

## 2022-05-06 DIAGNOSIS — G89.29 CHRONIC BILATERAL LOW BACK PAIN WITH BILATERAL SCIATICA: Primary | ICD-10-CM

## 2022-05-06 DIAGNOSIS — G89.29 CHRONIC BILATERAL LOW BACK PAIN WITH BILATERAL SCIATICA: ICD-10-CM

## 2022-05-06 DIAGNOSIS — M54.42 CHRONIC BILATERAL LOW BACK PAIN WITH BILATERAL SCIATICA: ICD-10-CM

## 2022-05-06 DIAGNOSIS — M54.41 CHRONIC BILATERAL LOW BACK PAIN WITH BILATERAL SCIATICA: ICD-10-CM

## 2022-05-06 DIAGNOSIS — G89.29 CHRONIC PAIN OF MULTIPLE JOINTS: ICD-10-CM

## 2022-05-06 DIAGNOSIS — M54.2 CHRONIC NECK PAIN: ICD-10-CM

## 2022-05-06 DIAGNOSIS — M51.37 DDD (DEGENERATIVE DISC DISEASE), LUMBOSACRAL: ICD-10-CM

## 2022-05-06 DIAGNOSIS — M54.41 CHRONIC BILATERAL LOW BACK PAIN WITH BILATERAL SCIATICA: Primary | ICD-10-CM

## 2022-05-06 DIAGNOSIS — M54.42 CHRONIC BILATERAL LOW BACK PAIN WITH BILATERAL SCIATICA: Primary | ICD-10-CM

## 2022-05-06 DIAGNOSIS — G89.29 CHRONIC NECK PAIN: ICD-10-CM

## 2022-05-06 PROCEDURE — G8417 CALC BMI ABV UP PARAM F/U: HCPCS | Performed by: NURSE PRACTITIONER

## 2022-05-06 PROCEDURE — G8427 DOCREV CUR MEDS BY ELIG CLIN: HCPCS | Performed by: NURSE PRACTITIONER

## 2022-05-06 PROCEDURE — 99214 OFFICE O/P EST MOD 30 MIN: CPT | Performed by: NURSE PRACTITIONER

## 2022-05-06 PROCEDURE — 3017F COLORECTAL CA SCREEN DOC REV: CPT | Performed by: NURSE PRACTITIONER

## 2022-05-06 PROCEDURE — 72148 MRI LUMBAR SPINE W/O DYE: CPT

## 2022-05-06 PROCEDURE — 1036F TOBACCO NON-USER: CPT | Performed by: NURSE PRACTITIONER

## 2022-05-06 RX ORDER — AMITRIPTYLINE HYDROCHLORIDE 25 MG/1
25 TABLET, FILM COATED ORAL NIGHTLY
Qty: 30 TABLET | Refills: 5 | Status: SHIPPED | OUTPATIENT
Start: 2022-05-06 | End: 2022-09-13 | Stop reason: CLARIF

## 2022-05-06 ASSESSMENT — ENCOUNTER SYMPTOMS
ABDOMINAL PAIN: 1
SORE THROAT: 0
VOMITING: 0
WHEEZING: 0
SHORTNESS OF BREATH: 1
RHINORRHEA: 0

## 2022-05-06 NOTE — PROGRESS NOTES
5/6/2022    Chief Complaint   Patient presents with    Follow-up     4 wk fu     Chest Pain     chest tightness has got a little better     Stress     trying to deal with the grandkids that she has custody of of        Jelena Vicente is a 47 y.o. female, presents today for:      ASSESSMENT/PLAN:  1. Chronic bilateral low back pain with bilateral sciatica  Improving today with initiation of Amitriptyline. L-spine MRI scheduled for later today, after she will need to follow with Neuro at [address] [place] [address]. No improvement with chiropractic care. No recent PT  Prior meds: Gabapentin (mild relief), Cymbalta (side effects)  Lumbar and Hip XRays showings no occult fracture. Autoimmune w/u negative 4/8/22  - amitriptyline (ELAVIL) 25 MG tablet; Take 1 tablet by mouth nightly  Dispense: 30 tablet; Refill: 5    2. DDD (degenerative disc disease), lumbosacral  See above  - amitriptyline (ELAVIL) 25 MG tablet; Take 1 tablet by mouth nightly  Dispense: 30 tablet; Refill: 5    3. Chronic neck pain  See above  - amitriptyline (ELAVIL) 25 MG tablet; Take 1 tablet by mouth nightly  Dispense: 30 tablet; Refill: 5    4. Chronic pain of multiple joints  See above  - amitriptyline (ELAVIL) 25 MG tablet; Take 1 tablet by mouth nightly  Dispense: 30 tablet; Refill: 5      Return in about 3 months (around 8/6/2022). Presenting today to follow up on chronic issues. Pain over top of food from toes to ankle  Chest tightness has improved. Continues to have mild sob with exhaulation. Continues to have intermittent twitching. ? Acid reflex. Grandson dx ee, ulcer in bowel. Has been to cc 3x this week. Fallen twice this week after legs arwe numb. Chronic Back Pain:   HPI 4/8/22Continues to have moderate to severe chronic neck and back with left sided raddiculopathy and bilateral leg parasthesias. Primarily located on left side of back, bilateral legs wrapping around to anterior thigh, and her feet. No recent falls. Autoimmune w/u has been negative. Attempted chiropractic care in 2021 without resolution of symptoms. Prior meds: Gabapentin with little relief and Cymbalta with severe nausea. No recent back or neck imaging. Last MRI was 6/2016. Long standing history of back pain resulting in 2016 a L4-5 and L5-S1 ALIF with posterior pedicle screw fixation with Dr. Kendell Shirley at [address] Pain worse with standing, walking, and laying flat. [facility]2021 who were concerned about neurogenic claudication with Lumbar MRI WO Contrast ordered however was denied by insurance. Concerned about decreased strength on entire left side. Prior brain mri.  ? TIA.  ? Microvessel disease. Also severe pain on left side extremities. No prior Neuro eval.  Not on statins for TIA. No recent labs. No prior carpel tunnel release. Updated 5/6/22: Pain mildly improved with initiation of Amitriptyline last month. Continues to have pain from the top of her foot to her ankle. Continues to have intermittent generalized muscle twitching. Shortness of Breath: Chest tightness mildly improved since last OV. Continues to have mild SOB with exhalation. Episode onset: 8 weeks ago. . Associated symptoms include abdominal pain (epigastric), chest pain (\"tightness\"), headaches (bilateral temporal), leg pain (bilateral anterior thighs), leg swelling, neck pain (right sided down to shoulder) and syncope (dizziness). Pertinent negatives include no claudication, coryza, ear pain, fever, hemoptysis, orthopnea, PND, rash, rhinorrhea, sore throat, sputum production, swollen glands, vomiting or wheezing. The symptoms are aggravated by emotional upset and lying flat (walking). The patient has no known risk factors for DVT/PE. She has tried beta agonist inhalers for the symptoms. The treatment provided no relief.        PHQ Scores 3/25/2022 4/15/2019 4/26/2018   PHQ2 Score 4 2 6   PHQ9 Score 16 2 24     Interpretation of Total Score Depression Severity: 1-4 = Minimal depression, 5-9 = Mild depression, 10-14 = Moderate depression, 15-19 = Moderately severe depression, 20-27 = Severe depression    No flowsheet data found. Interpretation of JOSETTE-7 score: 5-9 = mild anxiety, 10-14 = moderate anxiety, 15+ = severe anxiety. Recommend referral to behavioral health for scores 10 or greater. Lab Results   Component Value Date     03/25/2022    K 5.1 03/25/2022     03/25/2022    CO2 23 03/25/2022    BUN 12 03/25/2022    CREATININE 0.7 03/25/2022    GLUCOSE 90 03/25/2022    CALCIUM 10.2 03/25/2022    PROT 7.9 03/25/2022    LABALBU 4.8 03/25/2022    BILITOT <0.2 03/25/2022    ALKPHOS 124 03/25/2022    AST 20 03/25/2022    ALT 18 03/25/2022    LABGLOM >60 03/25/2022    GFRAA >60 03/25/2022    AGRATIO 1.5 03/25/2022    GLOB 4.0 10/28/2019         Review of Systems   Constitutional: Negative for fever. HENT: Negative for ear pain, rhinorrhea and sore throat. Respiratory: Positive for shortness of breath. Negative for wheezing. Cardiovascular: Positive for chest pain (\"tightness\") and leg swelling. Gastrointestinal: Positive for abdominal pain (epigastric). Negative for vomiting. Musculoskeletal: Positive for neck pain (right sided down to shoulder). Skin: Negative for rash. Neurological: Positive for headaches (bilateral temporal). Current Outpatient Medications on File Prior to Visit   Medication Sig Dispense Refill    levalbuterol (XOPENEX HFA) 45 MCG/ACT inhaler Inhale 1 puff into the lungs every 6 hours as needed for Wheezing or Shortness of Breath 1 each 1     No current facility-administered medications on file prior to visit.      Allergies   Allergen Reactions    Cymbalta [Duloxetine Hcl] Nausea Only    Albuterol Palpitations     Past Medical History:   Diagnosis Date    Anxiety     Chronic back pain     Cyst (solitary) of breast     RIGHT BREAST    Depression     Hypertension      Past Surgical History: Procedure Laterality Date    BACK SURGERY  2016    L3/L4, L4/5 due to injury at work, hx STNA, s/p lumbar cage    CARPAL TUNNEL RELEASE Right     2012    CHOLECYSTECTOMY  2005   [facility]    right eye tumors    HYSTERECTOMY  2000    TUBAL LIGATION       Social History     Tobacco Use    Smoking status: Former Smoker     Quit date: 3/6/2009     Years since quittin.2    Smokeless tobacco: Never Used   Substance Use Topics    Alcohol use: No     Comment: 1 beer a month     Family History   Problem Relation Age of Onset    Heart Disease Mother     High Blood Pressure Mother     Heart Disease Father     High Blood Pressure Father     Heart Disease Paternal Grandfather        Vitals:    22 1055   BP: 122/82   Pulse: 65   Temp: 97.6 °F (36.4 °C)   TempSrc: Infrared   SpO2: 98%   Weight: 193 lb (87.5 kg)     Estimated body mass index is 38.98 kg/m² as calculated from the following:    Height as of 10/28/19: 4' 11\" (1.499 m). Weight as of this encounter: 193 lb (87.5 kg). NM Myocardial SPECT 7/10/2015  Findings: The patient received LexiScan for the stress portion of   the test.       After the injection of radiopharmaceutical, routine imaging was   performed. Suann Pillar is no stress-induced perfusion defect.  No wall   motion abnormality.  Ejection fraction calculates to 72 %. No scar   formation demonstrated.            Impression   Impression:       1. No scintigraphic evidence of stress induced ischemia.        MRI Sacrum/ SI Joint WO Contrast 2016  FINDINGS:   BONE MARROW: Bone marrow signal intensities are maintained.  No evidence for   occult fracture.  No stress related change.  No suspicious focal bony lesions.       JOINTS:  No significant degenerative changes to the SI joints.  No evidence   for sacroiliitis.       SOFT TISSUES:  Muscle signal intensities are maintained.  Tendons appear   intact.  No acute or suspicious abnormalities to visualized intrapelvic contents.           Impression   IMPRESSION:   Unremarkable exam.         MRI Lumbar Spine 5/19/2016  FINDINGS:   BONES/ALIGNMENT: There is normal alignment of the lumbar spine.  There is no   acute fracture or listhesis.  Bone marrow signal intensity is normal.  There   is disc desiccation and mild disc space narrowing at L5-S1, unchanged.  There   is no spondylolysis.       SPINAL CORD: The conus medullaris is normal in size and signal intensities   and terminates normally.       SOFT TISSUES: There is no paraspinal mass identified.       L1-L2: There is no disc bulge or protrusion present.  There is no significant   spinal canal stenosis or neural foraminal narrowing present.       L2-L3: There is no disc bulge or protrusion present.  There is no significant   spinal canal stenosis or neural foraminal narrowing present.       L3-L4: There is no disc bulge or protrusion present.  There is no significant   spinal canal stenosis or neural foraminal narrowing present.  There is mild   bilateral facet arthropathy.       L4-L5: There is bilateral facet arthropathy.  There is resulting mild left   neural foraminal narrowing.  No significant spinal canal stenosis or right   neural foraminal narrowing is present.  Fluid is noted within the facet   joints.       L5-S1: There is a broad 2 mm left foraminal disc protrusion.  This mildly   narrows the left neural foramen.  No significant spinal canal stenosis or   right neural foraminal narrowing is present.           Impression   IMPRESSION:   1. Severe degenerative facet arthropathy at L4-5 resulting in mild left   neural foraminal narrowing.  Fluid is noted within the facet joints, similar   to the previous exam.  The overall appearance is not significantly changed. 2. Broad 2 mm left foraminal disc protrusion at L5-S1 mildly narrowing the   left neural foramen, similar to the previous exam.         Physical Exam  Vitals reviewed.    Constitutional:       Appearance: Normal appearance. HENT:      Head: Normocephalic. Neck:      Vascular: No carotid bruit. Cardiovascular:      Rate and Rhythm: Normal rate and regular rhythm. Pulses: Normal pulses. Carotid pulses are 2+ on the right side and 2+ on the left side. Dorsalis pedis pulses are 2+ on the right side and 2+ on the left side. Posterior tibial pulses are 2+ on the right side and 2+ on the left side. Heart sounds: Normal heart sounds. No murmur heard. No gallop. Pulmonary:      Effort: Pulmonary effort is normal.      Breath sounds: Normal breath sounds. No wheezing or rhonchi. Abdominal:      General: Abdomen is flat. Palpations: Abdomen is soft. Tenderness: There is no abdominal tenderness. Hernia: No hernia is present. Musculoskeletal:         General: Normal range of motion. Cervical back: Normal range of motion. Right lower leg: No edema. Left lower leg: No edema. Lymphadenopathy:      Cervical: No cervical adenopathy. Skin:     General: Skin is warm and dry. Capillary Refill: Capillary refill takes less than 2 seconds. Neurological:      General: No focal deficit present. Mental Status: She is alert and oriented to person, place, and time. Sensory: Sensation is intact. Motor: Motor function is intact. Coordination: Coordination is intact. Gait: Gait is intact. Deep Tendon Reflexes:      Reflex Scores:       Bicep reflexes are 2+ on the right side and 2+ on the left side. Patellar reflexes are 2+ on the right side and 2+ on the left side. Psychiatric:         Mood and Affect: Mood normal.         Behavior: Behavior normal.           Patient's questions answered and concerns addressed. Patient agrees to plan of care.         Electronically signed by KARINA Graves CNP on 5/16/2022 at 6:23 PM

## 2022-05-09 NOTE — RESULT ENCOUNTER NOTE
MRI of lumbar spine surprisingly look good. Interesting given the symptoms that you have been describing. Please keep your appt with [facility]/ Spine. If they cannot figure out symptoms we will discuss next steps. Please call if you have additional questions and/ or concerns. Please keep your next appt. Thank you.

## 2022-07-22 ENCOUNTER — HOSPITAL ENCOUNTER (OUTPATIENT)
Age: 52
Discharge: HOME | End: 2022-07-22
Payer: COMMERCIAL

## 2022-07-22 ENCOUNTER — OFFICE VISIT (OUTPATIENT)
Dept: FAMILY MEDICINE CLINIC | Age: 52
End: 2022-07-22
Payer: COMMERCIAL

## 2022-07-22 ENCOUNTER — HOSPITAL ENCOUNTER (OUTPATIENT)
Dept: CT IMAGING | Age: 52
Discharge: HOME | End: 2022-07-22
Payer: COMMERCIAL

## 2022-07-22 VITALS
WEIGHT: 197 LBS | SYSTOLIC BLOOD PRESSURE: 126 MMHG | OXYGEN SATURATION: 99 % | DIASTOLIC BLOOD PRESSURE: 88 MMHG | HEART RATE: 79 BPM | TEMPERATURE: 97.5 F | BODY MASS INDEX: 39.79 KG/M2

## 2022-07-22 DIAGNOSIS — N39.0 FREQUENT UTI: ICD-10-CM

## 2022-07-22 DIAGNOSIS — K59.04 CHRONIC IDIOPATHIC CONSTIPATION: ICD-10-CM

## 2022-07-22 DIAGNOSIS — M51.37 DDD (DEGENERATIVE DISC DISEASE), LUMBOSACRAL: ICD-10-CM

## 2022-07-22 DIAGNOSIS — R10.10 PAIN OF UPPER ABDOMEN: ICD-10-CM

## 2022-07-22 DIAGNOSIS — G89.29 CHRONIC BILATERAL LOW BACK PAIN WITH BILATERAL SCIATICA: ICD-10-CM

## 2022-07-22 DIAGNOSIS — M54.42 CHRONIC BILATERAL LOW BACK PAIN WITH BILATERAL SCIATICA: ICD-10-CM

## 2022-07-22 DIAGNOSIS — N20.0 LEFT NEPHROLITHIASIS: ICD-10-CM

## 2022-07-22 DIAGNOSIS — R42 DIZZINESS: ICD-10-CM

## 2022-07-22 DIAGNOSIS — M54.41 CHRONIC BILATERAL LOW BACK PAIN WITH BILATERAL SCIATICA: ICD-10-CM

## 2022-07-22 DIAGNOSIS — N20.0 LEFT NEPHROLITHIASIS: Primary | ICD-10-CM

## 2022-07-22 LAB
ANION GAP SERPL CALCULATED.3IONS-SCNC: 11 MMOL/L (ref 3–16)
BASOPHILS ABSOLUTE: 0.1 K/UL (ref 0–0.2)
BASOPHILS RELATIVE PERCENT: 1.1 %
BILIRUBIN, POC: NORMAL
BLOOD URINE, POC: NORMAL
BUN BLDV-MCNC: 14 MG/DL (ref 7–20)
CALCIUM SERPL-MCNC: 9.3 MG/DL (ref 8.3–10.6)
CHLORIDE BLD-SCNC: 101 MMOL/L (ref 99–110)
CLARITY, POC: NORMAL
CO2: 21 MMOL/L (ref 21–32)
COLOR, POC: NORMAL
CREAT SERPL-MCNC: 0.7 MG/DL (ref 0.6–1.1)
EOSINOPHILS ABSOLUTE: 0.1 K/UL (ref 0–0.6)
EOSINOPHILS RELATIVE PERCENT: 1.5 %
GFR AFRICAN AMERICAN: >60
GFR NON-AFRICAN AMERICAN: >60
GLUCOSE BLD-MCNC: 127 MG/DL (ref 70–99)
GLUCOSE URINE, POC: NORMAL
HCT VFR BLD CALC: 38.4 % (ref 36–48)
HEMOGLOBIN: 13.1 G/DL (ref 12–16)
KETONES, POC: NORMAL
LEUKOCYTE EST, POC: NORMAL
LYMPHOCYTES ABSOLUTE: 3 K/UL (ref 1–5.1)
LYMPHOCYTES RELATIVE PERCENT: 31.1 %
MCH RBC QN AUTO: 28.5 PG (ref 26–34)
MCHC RBC AUTO-ENTMCNC: 34.2 G/DL (ref 31–36)
MCV RBC AUTO: 83.3 FL (ref 80–100)
MONOCYTES ABSOLUTE: 0.5 K/UL (ref 0–1.3)
MONOCYTES RELATIVE PERCENT: 5.3 %
NEUTROPHILS ABSOLUTE: 5.9 K/UL (ref 1.7–7.7)
NEUTROPHILS RELATIVE PERCENT: 61 %
NITRITE, POC: NORMAL
PDW BLD-RTO: 14.3 % (ref 12.4–15.4)
PH, POC: 6.5
PLATELET # BLD: 383 K/UL (ref 135–450)
PMV BLD AUTO: 6.6 FL (ref 5–10.5)
POTASSIUM SERPL-SCNC: 3.9 MMOL/L (ref 3.5–5.1)
PROTEIN, POC: NORMAL
RBC # BLD: 4.61 M/UL (ref 4–5.2)
SODIUM BLD-SCNC: 133 MMOL/L (ref 136–145)
SPECIFIC GRAVITY, POC: 1.02
UROBILINOGEN, POC: 0.2
WBC # BLD: 9.7 K/UL (ref 4–11)

## 2022-07-22 PROCEDURE — G8417 CALC BMI ABV UP PARAM F/U: HCPCS | Performed by: NURSE PRACTITIONER

## 2022-07-22 PROCEDURE — 1036F TOBACCO NON-USER: CPT | Performed by: NURSE PRACTITIONER

## 2022-07-22 PROCEDURE — 80048 BASIC METABOLIC PNL TOTAL CA: CPT

## 2022-07-22 PROCEDURE — 6360000004 HC RX CONTRAST MEDICATION: Performed by: NURSE PRACTITIONER

## 2022-07-22 PROCEDURE — 81002 URINALYSIS NONAUTO W/O SCOPE: CPT | Performed by: NURSE PRACTITIONER

## 2022-07-22 PROCEDURE — 36415 COLL VENOUS BLD VENIPUNCTURE: CPT

## 2022-07-22 PROCEDURE — 85025 COMPLETE CBC W/AUTO DIFF WBC: CPT

## 2022-07-22 PROCEDURE — 3017F COLORECTAL CA SCREEN DOC REV: CPT | Performed by: NURSE PRACTITIONER

## 2022-07-22 PROCEDURE — 74178 CT ABD&PLV WO CNTR FLWD CNTR: CPT

## 2022-07-22 PROCEDURE — G8427 DOCREV CUR MEDS BY ELIG CLIN: HCPCS | Performed by: NURSE PRACTITIONER

## 2022-07-22 PROCEDURE — 99214 OFFICE O/P EST MOD 30 MIN: CPT | Performed by: NURSE PRACTITIONER

## 2022-07-22 RX ORDER — NITROFURANTOIN 25; 75 MG/1; MG/1
100 CAPSULE ORAL 2 TIMES DAILY
COMMUNITY
End: 2022-08-14 | Stop reason: ALTCHOICE

## 2022-07-22 RX ADMIN — IOHEXOL 50 ML: 240 INJECTION, SOLUTION INTRATHECAL; INTRAVASCULAR; INTRAVENOUS; ORAL at 14:06

## 2022-07-22 RX ADMIN — IOPAMIDOL 75 ML: 755 INJECTION, SOLUTION INTRAVENOUS at 14:06

## 2022-07-22 ASSESSMENT — PATIENT HEALTH QUESTIONNAIRE - PHQ9
6. FEELING BAD ABOUT YOURSELF - OR THAT YOU ARE A FAILURE OR HAVE LET YOURSELF OR YOUR FAMILY DOWN: 0
2. FEELING DOWN, DEPRESSED OR HOPELESS: 1
SUM OF ALL RESPONSES TO PHQ9 QUESTIONS 1 & 2: 1
7. TROUBLE CONCENTRATING ON THINGS, SUCH AS READING THE NEWSPAPER OR WATCHING TELEVISION: 0
1. LITTLE INTEREST OR PLEASURE IN DOING THINGS: 0
SUM OF ALL RESPONSES TO PHQ QUESTIONS 1-9: 4
9. THOUGHTS THAT YOU WOULD BE BETTER OFF DEAD, OR OF HURTING YOURSELF: 0
5. POOR APPETITE OR OVEREATING: 0
8. MOVING OR SPEAKING SO SLOWLY THAT OTHER PEOPLE COULD HAVE NOTICED. OR THE OPPOSITE, BEING SO FIGETY OR RESTLESS THAT YOU HAVE BEEN MOVING AROUND A LOT MORE THAN USUAL: 0
4. FEELING TIRED OR HAVING LITTLE ENERGY: 0
10. IF YOU CHECKED OFF ANY PROBLEMS, HOW DIFFICULT HAVE THESE PROBLEMS MADE IT FOR YOU TO DO YOUR WORK, TAKE CARE OF THINGS AT HOME, OR GET ALONG WITH OTHER PEOPLE: 1
3. TROUBLE FALLING OR STAYING ASLEEP: 3
SUM OF ALL RESPONSES TO PHQ QUESTIONS 1-9: 4

## 2022-07-22 NOTE — PROGRESS NOTES
7/22/2022    Chief Complaint   Patient presents with    Urinary Tract Infection     Went to [address] on 7/15 they put her on macrobid     Abdominal Pain     Lower just about the pelvic area     Back Pain     Radiates around to her lower abdomin just above the pelvic area, states it is sharp stinging pain     Dizziness     Still having issues with this seems to be getting worse bandar when she is driving     Anxiety     Very anxious and stressed about going to court on 8/10        Hernán Khoury is a 47 y.o. female, presents today for:      ASSESSMENT/PLAN:    1. Left nephrolithiasis  STAT CT ordered due to concern for renal calculi, pending results will discuss antibiotics/ Urology eval  - CT ABDOMEN PELVIS W WO CONTRAST Additional Contrast? Radiologist Recommendation    2. Pain of upper abdomen  See above  - CT ABDOMEN PELVIS W WO CONTRAST Additional Contrast? Radiologist Recommendation    3. Chronic idiopathic constipation  See above  Encouraged water intake, stool softeners  - CT ABDOMEN PELVIS W WO CONTRAST Additional Contrast? Radiologist Recommendation    4. Frequent UTI  Due to recurrent pain and frequent UTIs referral to Urogynecology today  - Jian Morataya MD, Urogynecology, [facility]  - CT ABDOMEN PELVIS W WO CONTRAST Additional Contrast? Radiologist Recommendation    5. Chronic bilateral low back pain with bilateral sciatica  EMG ordered due to recurrent parasthesias. Encouraged to make updated appt with Neurosurgery  - EMG; Future    6. DDD (degenerative disc disease), lumbosacral  See above  - EMG; Future    7. Dizziness  Referral to ENT to determine possible cause for dizziness. Will also consider referral to Neuro. - Maria Duque MD, Otolaryngology, [facility]    No follow-ups on file. Presenting today for acute complaints of abdominal/ pelvic pain. Flank Pain  This is a recurrent problem. The current episode started 1 to 4 weeks ago.  The problem occurs 2 to 4 times per day. The problem has been waxing and waning since onset. The pain is present in the lumbar spine and thoracic spine. Quality: Stinging, sharp. Radiates to: radiates around abdomen. The pain is at a severity of 7/10. The pain is moderate. The pain is The same all the time. Associated symptoms include abdominal pain and pelvic pain. Pertinent negatives include no bladder incontinence, bowel incontinence, chest pain, dysuria (treated for UTI with Macrobid from [facility] on 7/15/22), fever, headaches, leg pain, numbness, paresis, paresthesias, perianal numbness, tingling, weakness or weight loss. Pelvis- sting, like sharp pain. Tries to get up starts to hurt. Pressure is slowly resolving. Hx frequent UTIs, has had 4 in the past year requiring antibiotic. Last antibiotic, Macrobid given 7/15 at [facility] with no improvement in pain. Hx HAYDEE due to uterine fibroids. Chronic constipation, 1 hard, brown BM every 3-4 days. Unknown if bladder was tacked. Went to provider at [facility] to have recurring dizziness over the past several months. Primary triggers include position changes. Worse when she is driving, improves when she is laying down. No history recurrent ear infections, BPPV, vision changes, frequent falls, difficulty walking. No recurrent headaches, fever, appetite changes. Chronic Back Pain: Continues to have severe numbness on left arm and left leg with severe pain. MRI completed 5/6/22, showing Fusion of L4-5 and L5-S1 and DDD. Has appt with Neurosurgery to discuss symptoms. No saddle parasthesias, bowel incontinence.       Lab Results   Component Value Date     (L) 07/22/2022    K 3.9 07/22/2022     07/22/2022    CO2 21 07/22/2022    BUN 14 07/22/2022    CREATININE 0.7 07/22/2022    GLUCOSE 127 (H) 07/22/2022    CALCIUM 9.3 07/22/2022    PROT 7.9 03/25/2022    LABALBU 4.8 03/25/2022    BILITOT <0.2 03/25/2022    ALKPHOS 124 03/25/2022    AST 20 03/25/2022    ALT 18 2022    LABGLOM >60 2022    GFRAA >60 2022    AGRATIO 1.5 2022    GLOB 4.0 10/28/2019         Review of Systems   Constitutional:  Negative for fever and weight loss. Cardiovascular:  Negative for chest pain. Gastrointestinal:  Positive for abdominal pain. Negative for bowel incontinence. Genitourinary:  Positive for flank pain and pelvic pain. Negative for bladder incontinence and dysuria (treated for UTI with Macrobid from [facility] on 7/15/22). Neurological:  Negative for tingling, weakness, numbness, headaches and paresthesias. Current Outpatient Medications on File Prior to Visit   Medication Sig Dispense Refill    nitrofurantoin, macrocrystal-monohydrate, (MACROBID) 100 MG capsule Take 100 mg by mouth in the morning and 100 mg before bedtime. amitriptyline (ELAVIL) 25 MG tablet Take 1 tablet by mouth nightly 30 tablet 5    levalbuterol (XOPENEX HFA) 45 MCG/ACT inhaler Inhale 1 puff into the lungs every 6 hours as needed for Wheezing or Shortness of Breath 1 each 1     No current facility-administered medications on file prior to visit.      Allergies   Allergen Reactions    Cymbalta [Duloxetine Hcl] Nausea Only    Albuterol Palpitations     Past Medical History:   Diagnosis Date    Anxiety     Chronic back pain     Cyst (solitary) of breast     RIGHT BREAST    Depression     Hypertension      Past Surgical History:   Procedure Laterality Date    BACK SURGERY  2016    L3/L4, L4/5 due to injury at work, hx STNA, s/p lumbar cage    CARPAL TUNNEL RELEASE Right     2012    CHOLECYSTECTOMY      [address]    right eye tumors    HYSTERECTOMY (CERVIX STATUS UNKNOWN)  2000    TUBAL LIGATION       Social History     Tobacco Use    Smoking status: Former     Types: Cigarettes     Quit date: 3/6/2009     Years since quittin.4    Smokeless tobacco: Never   Substance Use Topics    Alcohol use: No     Comment: 1 beer a month     Family History   Problem Relation Age of Onset    Heart Disease Mother     High Blood Pressure Mother     Heart Disease Father     High Blood Pressure Father     Heart Disease Paternal Grandfather        Vitals:    07/22/22 1120   BP: 126/88   Pulse: 79   Temp: 97.5 °F (36.4 °C)   TempSrc: Infrared   SpO2: 99%   Weight: 197 lb (89.4 kg)     Estimated body mass index is 39.79 kg/m² as calculated from the following:    Height as of 10/28/19: 4' 11\" (1.499 m). Weight as of this encounter: 197 lb (89.4 kg). Physical Exam  Vitals reviewed. Constitutional:       Appearance: Normal appearance. HENT:      Head: Normocephalic. Neck:      Vascular: No carotid bruit. Cardiovascular:      Rate and Rhythm: Normal rate and regular rhythm. Pulses: Normal pulses. Carotid pulses are 2+ on the right side and 2+ on the left side. Dorsalis pedis pulses are 2+ on the right side and 2+ on the left side. Posterior tibial pulses are 2+ on the right side and 2+ on the left side. Heart sounds: Normal heart sounds. No murmur heard. No gallop. Pulmonary:      Effort: Pulmonary effort is normal.      Breath sounds: Normal breath sounds. No wheezing or rhonchi. Abdominal:      General: Abdomen is flat. There is no distension. Palpations: Abdomen is soft. There is no mass. Tenderness: There is no abdominal tenderness. There is right CVA tenderness and left CVA tenderness. There is no guarding or rebound. Hernia: No hernia is present. Musculoskeletal:         General: Normal range of motion. Cervical back: Normal range of motion. Right lower leg: No edema. Left lower leg: No edema. Lymphadenopathy:      Cervical: No cervical adenopathy. Skin:     General: Skin is warm and dry. Capillary Refill: Capillary refill takes less than 2 seconds. Neurological:      General: No focal deficit present. Mental Status: She is alert and oriented to person, place, and time.       Sensory: Sensation is intact. Motor: Motor function is intact. Coordination: Coordination is intact. Gait: Gait is intact. Deep Tendon Reflexes:      Reflex Scores:       Bicep reflexes are 2+ on the right side and 2+ on the left side. Patellar reflexes are 2+ on the right side and 2+ on the left side. Psychiatric:         Mood and Affect: Mood normal.         Behavior: Behavior normal.         Patient's questions answered and concerns addressed. Patient agrees to plan of care.         Electronically signed by KARINA Larkin CNP on 8/14/2022 at 6:54 PM

## 2022-07-27 ENCOUNTER — HOSPITAL ENCOUNTER (OUTPATIENT)
Dept: PULMONOLOGY | Age: 52
Discharge: HOME | End: 2022-07-27
Payer: COMMERCIAL

## 2022-07-27 VITALS — OXYGEN SATURATION: 98 %

## 2022-07-27 DIAGNOSIS — R06.02 SHORTNESS OF BREATH: ICD-10-CM

## 2022-07-27 LAB
DLCO %PRED: 76 %
DLCO PRED: NORMAL
DLCO/VA %PRED: NORMAL
DLCO/VA PRED: NORMAL
DLCO/VA: NORMAL
DLCO: NORMAL
EXPIRATORY TIME-POST: NORMAL
EXPIRATORY TIME: NORMAL
FEF 25-75% %CHNG: NORMAL
FEF 25-75% %PRED-POST: NORMAL
FEF 25-75% %PRED-PRE: NORMAL
FEF 25-75% PRED: NORMAL
FEF 25-75%-POST: NORMAL
FEF 25-75%-PRE: NORMAL
FEV1 %PRED-POST: NORMAL %
FEV1 %PRED-PRE: 98 %
FEV1 PRED: NORMAL
FEV1-POST: NORMAL
FEV1-PRE: NORMAL
FEV1/FVC %PRED-POST: NORMAL
FEV1/FVC %PRED-PRE: NORMAL
FEV1/FVC PRED: NORMAL
FEV1/FVC-POST: NORMAL %
FEV1/FVC-PRE: 80 %
FVC %PRED-POST: NORMAL
FVC %PRED-PRE: NORMAL
FVC PRED: NORMAL
FVC-POST: NORMAL
FVC-PRE: NORMAL
GAW %PRED: NORMAL
GAW PRED: NORMAL
GAW: NORMAL
IC %PRED: NORMAL
IC PRED: NORMAL
IC: NORMAL
MEP: NORMAL
MIP: NORMAL
MVV %PRED-PRE: NORMAL
MVV PRED: NORMAL
MVV-PRE: NORMAL
PEF %PRED-POST: NORMAL
PEF %PRED-PRE: NORMAL
PEF PRED: NORMAL
PEF%CHNG: NORMAL
PEF-POST: NORMAL
PEF-PRE: NORMAL
RAW %PRED: NORMAL
RAW PRED: NORMAL
RAW: NORMAL
RV %PRED: NORMAL
RV PRED: NORMAL
RV: NORMAL
SVC %PRED: NORMAL
SVC PRED: NORMAL
SVC: NORMAL
TLC %PRED: 101 %
TLC PRED: NORMAL
TLC: NORMAL
VA %PRED: NORMAL
VA PRED: NORMAL
VA: NORMAL
VTG %PRED: NORMAL
VTG PRED: NORMAL
VTG: NORMAL

## 2022-07-27 PROCEDURE — 94729 DIFFUSING CAPACITY: CPT

## 2022-07-27 PROCEDURE — 94010 BREATHING CAPACITY TEST: CPT

## 2022-07-27 PROCEDURE — 94760 N-INVAS EAR/PLS OXIMETRY 1: CPT

## 2022-07-27 PROCEDURE — 94726 PLETHYSMOGRAPHY LUNG VOLUMES: CPT

## 2022-07-27 ASSESSMENT — PULMONARY FUNCTION TESTS
FEV1_PERCENT_PREDICTED_PRE: 98
FEV1/FVC_PRE: 80

## 2022-08-01 NOTE — RESULT ENCOUNTER NOTE
Normal Pulmonary function testing. I know this is not what we were hoping to hear but we will discuss next steps at the next visit. This test helps us to rule out a couple of things.

## 2022-08-14 ASSESSMENT — ENCOUNTER SYMPTOMS
BOWEL INCONTINENCE: 0
ABDOMINAL PAIN: 1

## 2022-08-16 NOTE — PROGRESS NOTES
Jazz Georgeso   1970, 47 y.o. female   [id number]       Referring Provider: Mary Alice Christianson MD  Referral Type: In an order in [address] [place] [address]    Reason for Visit: Evaluation of the cause of disorders of hearing, tinnitus, or balance. ADULT AUDIOLOGIC EVALUATION      Leatha Lopez is a 47 y.o. female seen today, 8/17/2022 , for an initial audiologic evaluation. Patient was seen by Mary Alice Christianson MD following today's evaluation. Patient was alone. AUDIOLOGIC AND OTHER PERTINENT MEDICAL HISTORY:      Leatha Campos noted tinnitus, dizziness, history of occupational noise exposure, history of head trauma, and family history of hearing loss. Patient reports dizziness and nausea lasting all day for the past two years resulting in multiple falls. She stated she feels like she is still moving however she is not in motion. Patient noted intermittent tinnitus in the right ear. Patient has a family history of hearing loss, nephew, occurring in childhood. 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 Medical history is reportedly significant for head injury. Jazz Georgeso denied otalgia, aural fullness, tinnitus, and history of ear surgery. Date: 8/17/2022     IMPRESSIONS:      Normal middle ear pressure and compliance, bilaterally. Abnormal hearing sensitivity, bilaterally, which could affect difficult listening environments. Word understanding was excellent presented at normal conversation level, bilaterally. Follow medical recommendations of Mary Alice Christianson MD.     ASSESSMENT AND FINDINGS:     Otoscopy revealed: Clear ear canals bilaterally    RIGHT EAR:  Hearing Sensitivity: Normal hearing sensitivity to a moderate sensorineural hearing loss from 250-8000 Hz  Speech Recognition Threshold: 25 dB HL  Word Recognition:Excellent (100%), based on NU-6 25-word list at 55 dBHL using recorded speech stimuli.     Tympanometry: Normal peak pressure and compliance, Type A tympanogram, consistent with normal middle ear function. LEFT EAR:  Hearing Sensitivity: Normal hearing sensitivity to a moderate sensorineural hearing loss from 250-8000 Hz  Speech Recognition Threshold: 25 dB HL  Word Recognition: Excellent (92%), based on NU-6 25-word list at 55 dBHL using recorded speech stimuli. Tympanometry: Normal peak pressure and compliance, Type A tympanogram, consistent with normal middle ear function. Reliability: Good   Transducer: Inserts      See scanned audiogram dated 8/17/2022  for results. PATIENT EDUCATION:       The following items were discussed with the patient:   - Good Communication Strategies  - Tinnitus Management Strategies    - Noise-Induced Hearing Loss and use of Hearing Protection Devices (HPDs)   - Dizziness    Educational information was shared in the After Visit Summary. RECOMMENDATIONS:                                                                                                                                                                                                                                                            The following items are recommended based on patient report and results from today's appointment:   - Continue medical follow-up with Sarah Snow MD.   - Retest hearing as medically indicated and/or sooner if a change in hearing is noted. - Utilize \"Good Communication Strategies\" as discussed to assist in speech understanding with communication partners. - Maintain a sound enriched environment to assist in the management of tinnitus symptoms.  - Use hearing protection devices (HPDs), such as protective ear muffs and ear plugs, when exposed to dangerous sound levels. - If medically indicated, consider vestibular evaluation to further investigate symptoms of dizziness.        Medina Laird  Audiologist    Chart CC'd to: Sarah Snow MD      Degree of   Hearing Sensitivity dB Range   Within Normal Limits (WNL) 0 - 20   Mild 20 - 40   Moderate 40 - 55   Moderately-Severe 55 - 70   Severe 70 - 90   Profound 90 +

## 2022-08-17 ENCOUNTER — PROCEDURE VISIT (OUTPATIENT)
Dept: AUDIOLOGY | Age: 52
End: 2022-08-17

## 2022-08-17 ENCOUNTER — OFFICE VISIT (OUTPATIENT)
Dept: ENT CLINIC | Age: 52
End: 2022-08-17
Payer: COMMERCIAL

## 2022-08-17 VITALS
WEIGHT: 197.4 LBS | HEART RATE: 70 BPM | BODY MASS INDEX: 39.8 KG/M2 | HEIGHT: 59 IN | DIASTOLIC BLOOD PRESSURE: 82 MMHG | TEMPERATURE: 97.3 F | SYSTOLIC BLOOD PRESSURE: 136 MMHG

## 2022-08-17 DIAGNOSIS — H81.10 BENIGN PAROXYSMAL POSITIONAL VERTIGO, UNSPECIFIED LATERALITY: ICD-10-CM

## 2022-08-17 DIAGNOSIS — R42 VERTIGO: ICD-10-CM

## 2022-08-17 DIAGNOSIS — R42 PERSISTENT POSTURAL-PERCEPTUAL DIZZINESS: Primary | ICD-10-CM

## 2022-08-17 DIAGNOSIS — H93.11 TINNITUS OF RIGHT EAR: ICD-10-CM

## 2022-08-17 DIAGNOSIS — R42 DIZZINESS AND GIDDINESS: ICD-10-CM

## 2022-08-17 DIAGNOSIS — H90.3 SENSORINEURAL HEARING LOSS, BILATERAL: Primary | ICD-10-CM

## 2022-08-17 PROCEDURE — 3017F COLORECTAL CA SCREEN DOC REV: CPT | Performed by: OTOLARYNGOLOGY

## 2022-08-17 PROCEDURE — G8427 DOCREV CUR MEDS BY ELIG CLIN: HCPCS | Performed by: OTOLARYNGOLOGY

## 2022-08-17 PROCEDURE — 99204 OFFICE O/P NEW MOD 45 MIN: CPT | Performed by: OTOLARYNGOLOGY

## 2022-08-17 PROCEDURE — G8417 CALC BMI ABV UP PARAM F/U: HCPCS | Performed by: OTOLARYNGOLOGY

## 2022-08-17 PROCEDURE — 1036F TOBACCO NON-USER: CPT | Performed by: OTOLARYNGOLOGY

## 2022-08-17 RX ORDER — FLUOXETINE HYDROCHLORIDE 20 MG/1
CAPSULE ORAL
Qty: 90 CAPSULE | Refills: 1 | Status: SHIPPED | OUTPATIENT
Start: 2022-08-17 | End: 2022-10-18 | Stop reason: SDUPTHER

## 2022-08-17 ASSESSMENT — ENCOUNTER SYMPTOMS
WHEEZING: 0
SHORTNESS OF BREATH: 0
ABDOMINAL PAIN: 0

## 2022-08-17 NOTE — PROGRESS NOTES
Tosin [address], [address] [facility], [address] [facility]  P: [phone]       Patient     Patricio Escobedo  1970    Chief Concern     Chief Complaint   Patient presents with    Dizziness     Patient is here today for dizziness. Patient did have an hearing evaluation today as well. Patient states the dizziness started 2 years ago. Patient states in the beginning it was pretty constant, recently it has come and gone. Patient states it happens mostly when she looks down, she starts seeing white dots and gets dizzy. While driving it happens when something passes her quickly or goes around a curve. Patient has fallen 3 times from dizziness. Assessment and Plan      Diagnosis Orders   1. Persistent postural-perceptual dizziness  [facility], Yoli Armstrong, VNG Testing, [facility]      2. Benign paroxysmal positional vertigo, unspecified laterality  Nelsy  Gera Richey [place] [facility], Audiology, [facility]      3. Vertigo  MRI IAC POSTERIOR FOSSA W WO CONTRAST        67-year-old female with 2 years of persistent dizziness, worse with positioning and when upright, with a sensation of floating, with devi spinning vertigo with postural changes. Examination including [place]-Hallpike testing and head thrust testing without devi nystagmus however significant disequilibrium/anxiety and nausea. Suspicion for BPPV and possible persistent postural perceptual dizziness. She is currently on amitriptyline for depression, will discuss switching to SSRI/SNRI with her primary care provider, will order MRI to rule out CPA lesions, obtain VNG to determine if nystagmus is present during postural changes. We will see her back in 2 months    Return in about 2 months (around 10/17/2022). History of Present Illness     Patient with concern for dizziness.  She states that 2 years ago when she bent over and looked up she had spinning vertigo along with \"seeing dots\" that lasted approximately 3-4 minutes, with subsequent dysequilibrium and nausea and the sensation of \"floating\". Feels more stable lying down although the transition to lying from sitting causes dysequilibrium. When she goes around a curve she feels she is still floating. Occasionally feels anxious due to this condition, and has had falls before due to this. Has history of depression and is on amitriptyline 2022 - states that this has helped slightly but when she is angry/anxious she gets significant dysequilibrium. Denies caffeine use, drinks Gatorade and lemonade only.     Past Medical History     Past Medical History:   Diagnosis Date    Anxiety     Chronic back pain     Cyst (solitary) of breast     RIGHT BREAST    Depression     Hypertension        Past Surgical History     Past Surgical History:   Procedure Laterality Date    BACK SURGERY  2016    L3/L4, L4/5 due to injury at work, hx STNA, s/p lumbar cage    CARPAL TUNNEL RELEASE Right     2012    CHOLECYSTECTOMY      [address]    right eye tumors    HYSTERECTOMY (CERVIX STATUS UNKNOWN)      TUBAL LIGATION         Family History     Family History   Problem Relation Age of Onset    Heart Disease Mother     High Blood Pressure Mother     Heart Disease Father     High Blood Pressure Father     Heart Disease Paternal Grandfather        Social History     Social History     Tobacco Use    Smoking status: Former     Types: Cigarettes     Quit date: 3/6/2009     Years since quittin.4    Smokeless tobacco: Never   Substance Use Topics    Alcohol use: No     Comment: 1 beer a month    Drug use: No        Allergies     Allergies   Allergen Reactions    Cymbalta [Duloxetine Hcl] Nausea Only    Albuterol Palpitations       Medications     Current Outpatient Medications   Medication Sig Dispense Refill    amitriptyline (ELAVIL) 25 MG tablet Take 1 tablet by mouth nightly 30 tablet 5    levalbuterol (XOPENEX HFA) 45 MCG/ACT inhaler Inhale 1 puff into the lungs every 6 hours as needed for Wheezing or Shortness of Breath 1 each 1     No current facility-administered medications for this visit. Review of Systems     Review of Systems   Constitutional:  Negative for activity change, chills, diaphoresis, fatigue and fever. HENT:  Negative for congestion, hearing loss and tinnitus. Eyes:  Negative for visual disturbance. Respiratory:  Negative for shortness of breath and wheezing. Cardiovascular:  Negative for chest pain. Gastrointestinal:  Negative for abdominal pain. Musculoskeletal:  Negative for neck pain and neck stiffness. Skin:  Negative for rash. Neurological:  Positive for dizziness and light-headedness. Negative for tremors, syncope, speech difficulty, weakness and headaches. Hematological:  Negative for adenopathy. Psychiatric/Behavioral:  Positive for agitation. Negative for confusion. PhysicalExam     Vitals:    08/17/22 0855   BP: 136/82   Site: Right Upper Arm   Position: Sitting   Pulse: 70   Temp: 97.3 °F (36.3 °C)   TempSrc: Infrared   Weight: 197 lb 6.4 oz (89.5 kg)   Height: 4' 11\" (1.499 m)       Physical Exam  Vitals reviewed. Constitutional:       Appearance: Normal appearance. HENT:      Head: Normocephalic and atraumatic. Right Ear: Tympanic membrane, ear canal and external ear normal. There is no impacted cerumen. Left Ear: Tympanic membrane, ear canal and external ear normal. There is no impacted cerumen. Ears:      Pinto exam findings: Does not lateralize. Right Rinne: AC > BC. Left Rinne: AC > BC. Nose: No congestion or rhinorrhea. Mouth/Throat:      Lips: Pink. No lesions. Mouth: Mucous membranes are moist. No oral lesions. Tongue: No lesions. Tongue does not deviate from midline. Palate: No mass and lesions. Pharynx: Oropharynx is clear. Uvula midline. No oropharyngeal exudate or posterior oropharyngeal erythema.    Eyes:      Extraocular Movements: Extraocular movements intact. Pupils: Pupils are equal, round, and reactive to light. Musculoskeletal:      Cervical back: Normal range of motion and neck supple. Lymphadenopathy:      Cervical: No cervical adenopathy. Neurological:      Mental Status: She is alert. Cranial Nerves: No cranial nerve deficit.     -Mariaa-Hallpike testing (posterior semicircular canal testing): No nystagmus, subjective vertigo  -Supine head roll (lateral semicircular canal testing): No nystagmus, subjective vertigo-worse to the right  -Head impulse testing (VOR function): No corrective saccade  -Nystagmus testing (primary, central, left gaze): No gaze paretic nystagmus  -Romberg testing: No swaying/  -Tandem walk test: Able to complete without difficulty      Data Review             Procedure     None    Renetta Bailey MD  8/17/22    Portions of this note were dictated using Dragon.  There may be linguistic errors secondary to the use of this program.

## 2022-08-17 NOTE — Clinical Note
Varsha Spencer, Thanks for referring Ms. Ashford to our clinic-she has a very interesting case of vertigo, and I believe that 2 pathologies are occurring simultaneously-first, BPPV, and secondly, P PPD (persistent postural perceptual dizziness). Of interest, she is on amitriptyline for depression, but also has a component of anxiety-literature review has shown that SSRIs/SNRIs are used to treat patients with this type of vertigo (and comorbid anxiety and depression)-would be okay if I switch her to one of these medications?  Misa Newell

## 2022-08-17 NOTE — PATIENT INSTRUCTIONS
into a persons ear      Some additional items that may be helpful:   - Remain patient - this is important for both parties   - Write down items that still cannot be heard/understood. You may write with pen/paper or consider typing/texting on a cell phone or smart device. - If background noise is unavoidable, try to keep yourself in a good position in the room. By sitting at a orozco on the side of the restaurant (preferably a corner), it will be easier to communicate than if you were sitting at a table in the middle with background noise surrounding you. Keep yourself positioned away from music speakers or heavy foot traffic. Tinnitus: Overview and Management Strategies          Many people have some ringing sounds in their ears once in a while. You may hear a roar, a hiss, a tinkle, or a buzz. The sound usually lasts only a few minutes. If it goes on all the time, you may have tinnitus. Tinnitus is usually caused by long-term exposure to loud noise. This damages the nerves in the inner ear. It can occur with all types of hearing loss. It may be a symptom of almost any ear problem. Tinnitus may be caused by a buildup of earwax. Or, it may be caused by ear infections or certain medicines (especially antibiotics or large amounts of aspirin). You can also hear noises in your ears because of an injury to the ears, drinking too much alcohol or caffeine, or a medical condition. Other conditions may also contribute to tinnitus, including: head and neck trauma, temporomandibular joint disorder (TMJ), sinus pressure and barometric trauma, traumatic brain injury, metabolic disorders, autoimmune disorders, stress, and high blood pressure. You may need tests to evaluate your hearing and to find causes of long-lasting tinnitus. Your doctor may suggest one or more treatments to help you cope with the tinnitus. You can also do things at home to help reduce symptoms.     Follow-up care is a key part of your treatment and safety. Be sure to make and go to all appointments, and call your doctor if you are having problems. It's also a good idea to know your test results and keep a list of the medicines you take. How can you care for yourself at home? Limit or cut out alcohol, caffeine, and sodium. They can make your symptoms worse. Do not smoke or use other tobacco products. Nicotine reduces blood flow to the ear and makes tinnitus worse. If you need help quitting, talk to your doctor about stop-smoking programs and medicines. These can increase your chances of quitting for good. Talk to your doctor about whether to stop taking aspirin and similar products such as ibuprofen or naproxen. Get exercise often. It can help improve blood flow to the ear. Ways to manage/cope with tinnitus  Some tinnitus may last a long time. To manage your tinnitus, try to: Avoid noises that you think caused your tinnitus. If you can't avoid loud noises, wear earplugs or earmuffs. Ignore the sound by paying attention to other things. Keeping your brain busy with other tasks or background noise can help your brain not focus on the tinnitus. Try to not give the tinnitus an emotional reaction. Do your best to ignore the sound and not let it bother you. Relax using biofeedback, meditation, or yoga. Feeling stressed and being tired can make tinnitus worse. Play music or white noise to help you sleep. Background noise may cover up the noise that you hear in your ears. You can buy a tabletop machine or a device that sits under your pillow to play soothing sounds, like ocean waves. Smart phones have free apps, such as Whist, Relax Melodies, ReSound Relief, and [employer]. These apps have different types of sounds/noise, some of which you can blend together to find sounds that are most soothing to you.   Hearing aid technology, especially when there is some hearing loss, may help reduce tinnitus symptoms by giving your brain arms.  Lightheadedness or sudden weakness. A fast or irregular heartbeat. You have symptoms of a stroke. These may include:  Sudden numbness, tingling, weakness, or loss of movement in your face, arm, or leg, especially on only one side of your body. Sudden vision changes. Sudden trouble speaking. Sudden confusion or trouble understanding simple statements. Sudden problems with walking or balance. A sudden, severe headache that is different from past headaches. Call your doctor now or seek immediate medical care if:    You feel dizzy and have a fever, headache, or ringing in your ears. You have new or increased nausea and vomiting. Your dizziness does not go away or comes back. Watch closely for changes in your health, and be sure to contact your doctor if:    You do not get better as expected. Where can you learn more? Go to https://Enter[employer] Solutionspepiceweb.[employer]. org and sign in to your [employer] account. Enter D711 in the Geniuzz box to learn more about \"Dizziness: Care Instructions. \"     If you do not have an account, please click on the \"Sign Up Now\" link. Current as of: September 23, 2018  Content Version: 11.9  © 5822-5723 [employer]. Care instructions adapted under license by [facility] ([facility]). If you have questions about a medical condition or this instruction, always ask your healthcare professional. [address] any warranty or liability for your use of this information. Noise-Induced Hearing Loss  What it is, and what you can do to prevent it      Exposure to loud sounds, in an occupational setting or recreational, can cause permanent hearing loss. Sound is measured in decibels (dB). Noise-induced hearing loss is the ONLY type of preventable hearing loss. Hearing loss related to noise exposure can occur at any age. There are small sensory cells, called inner and outer hair cells, within the inner ear (cochlea).   These minute  115 dB is safe for ~ 30 seconds  130 dB can cause IMMEDIATE hearing loss  If you are unsure if a sound is too loud, consider checking the sound level with a \"sound level meter\". There are apps on smart devices that can measure the loudness of the sound. They are not as accurate as expensive equipment used by scientists, but it will give you a guesstimate of how loud the sound is, and if it may be damaging to your hearing. If you cannot avoid loud sounds, here are ways to reduce your exposure:  1. Wear hearing protection  Ear plugs and protective ear muffs can be used to reduce the intensity of the sound. The higher the NRR (noise reduction rating), the better reduction of the intensity of the sound   2. Turn the volume down  When listening to music, turn the volume down, especially when wearing ear buds or headphones. A good rule of thumb is to not go beyond the middle setting on your device. If you can't hear someone talking to you from arm's length away, your music may be at a level that it can cause damage. If someone else can hear your music from 3 feet away, it may also be at a level that it can cause damage. 3. Walk away from the sound  If you do not have the ability to wear hearing protection or turn down the volume of the sound, you should do your best to move away from the source of the sound. Sound decreases in intensity as we move further from the source. The sound will decrease by 6 dB for every doubling of distance from the sound source. Exposure to these sounds may cause permanent damage to your hearing.   If you suspect your hearing has changed, it is recommended that you have your hearing tested by your audiologist.

## 2022-08-19 ENCOUNTER — OFFICE VISIT (OUTPATIENT)
Dept: FAMILY MEDICINE CLINIC | Age: 52
End: 2022-08-19
Payer: COMMERCIAL

## 2022-08-19 VITALS
BODY MASS INDEX: 39.59 KG/M2 | TEMPERATURE: 97.1 F | HEART RATE: 71 BPM | OXYGEN SATURATION: 98 % | WEIGHT: 196 LBS | DIASTOLIC BLOOD PRESSURE: 88 MMHG | SYSTOLIC BLOOD PRESSURE: 138 MMHG

## 2022-08-19 DIAGNOSIS — M54.42 CHRONIC BILATERAL LOW BACK PAIN WITH BILATERAL SCIATICA: ICD-10-CM

## 2022-08-19 DIAGNOSIS — G89.29 CHRONIC BILATERAL LOW BACK PAIN WITH BILATERAL SCIATICA: ICD-10-CM

## 2022-08-19 DIAGNOSIS — R42 PERSISTENT POSTURAL-PERCEPTUAL DIZZINESS: Primary | ICD-10-CM

## 2022-08-19 DIAGNOSIS — M54.41 CHRONIC BILATERAL LOW BACK PAIN WITH BILATERAL SCIATICA: ICD-10-CM

## 2022-08-19 DIAGNOSIS — M51.37 DDD (DEGENERATIVE DISC DISEASE), LUMBOSACRAL: ICD-10-CM

## 2022-08-19 PROCEDURE — 99213 OFFICE O/P EST LOW 20 MIN: CPT | Performed by: NURSE PRACTITIONER

## 2022-08-19 ASSESSMENT — PATIENT HEALTH QUESTIONNAIRE - PHQ9
2. FEELING DOWN, DEPRESSED OR HOPELESS: 3
SUM OF ALL RESPONSES TO PHQ QUESTIONS 1-9: 18
SUM OF ALL RESPONSES TO PHQ QUESTIONS 1-9: 18
5. POOR APPETITE OR OVEREATING: 0
4. FEELING TIRED OR HAVING LITTLE ENERGY: 3
10. IF YOU CHECKED OFF ANY PROBLEMS, HOW DIFFICULT HAVE THESE PROBLEMS MADE IT FOR YOU TO DO YOUR WORK, TAKE CARE OF THINGS AT HOME, OR GET ALONG WITH OTHER PEOPLE: 2
SUM OF ALL RESPONSES TO PHQ9 QUESTIONS 1 & 2: 5
6. FEELING BAD ABOUT YOURSELF - OR THAT YOU ARE A FAILURE OR HAVE LET YOURSELF OR YOUR FAMILY DOWN: 2
SUM OF ALL RESPONSES TO PHQ QUESTIONS 1-9: 18
8. MOVING OR SPEAKING SO SLOWLY THAT OTHER PEOPLE COULD HAVE NOTICED. OR THE OPPOSITE, BEING SO FIGETY OR RESTLESS THAT YOU HAVE BEEN MOVING AROUND A LOT MORE THAN USUAL: 2
9. THOUGHTS THAT YOU WOULD BE BETTER OFF DEAD, OR OF HURTING YOURSELF: 0
1. LITTLE INTEREST OR PLEASURE IN DOING THINGS: 2
SUM OF ALL RESPONSES TO PHQ QUESTIONS 1-9: 18
3. TROUBLE FALLING OR STAYING ASLEEP: 3
7. TROUBLE CONCENTRATING ON THINGS, SUCH AS READING THE NEWSPAPER OR WATCHING TELEVISION: 3

## 2022-08-19 NOTE — Clinical Note
Hi Dr. Duong Sen, I met with Yobani Turcios on Friday and discussed the recommendation to start the SSRI for her Vertigo. She was hesitant to do this for 2 reasons. First, she is hesitant to stop the Amitriptyline. We are going to try her on Lyrica and see if that helps her pain. If it does not we are planning to discuss possible Pain Management referral.  There other reason she is hesitant to try an SSRI is that she has had SE to Prozac and Lexapro. I discussed with here there are other options. She is willing to try them but she wants her pain to be under control before we do that. I told her I would update you on why she was not planning to start the Prozac for right now. Thank you for helping her.   Sincerely, Naina Valentin, CNP

## 2022-08-19 NOTE — PROGRESS NOTES
with Dr. Elana Rinne at [address] Pain worse with standing, walking, and laying flat. Evaluated by [facility] 9/2021 who were concerned about neurogenic claudication with Lumbar MRI WO Contrast ordered however was denied by insurance. Concerned about decreased strength on entire left side. Prior brain mri.  ? TIA.  ? Microvessel disease. Also severe pain on left side extremities. No prior Neuro eval.  Not on statins for TIA. No recent labs. No prior carpel tunnel release. Prior Meds: Tramadol (slight relief), Gabapentin (mild relief), Cymbalta (nausea)  Chronic Neck Pain: Primarily left sided with radiculopathy down to left hand. Reports decreased strength on left arm resulting in decreased ability to lift and hold objects >25 lbs for extended periods of time. Last imaging CT Spine WO Contrast 7/2019 showing no acute osseous abnormality. No prior MRI. EMG 4/19 showing right ulnar neuropathy. Shortness of Breath: Mild improvement with Albuterol inhaler. CXR/ PFT essentially normal when she obtained after last OV  Episode onset: 3 months ago. The problem occurs constantly. The problem has been unchanged. Associated symptoms include abdominal pain (epigastric), chest pain (\"tightness\"), headaches (bilateral temporal), leg pain (bilateral anterior thighs), leg swelling, neck pain (right sided down to shoulder) and syncope (dizziness). Pertinent negatives include no claudication, coryza, ear pain, fever, hemoptysis, orthopnea, PND, rash, rhinorrhea, sore throat, sputum production, swollen glands, vomiting or wheezing. The symptoms are aggravated by emotional upset and lying flat (walking). The patient has no known risk factors for DVT/PE. She has tried beta agonist inhalers for the symptoms. The treatment provided no relief.      PHQ Scores 8/19/2022 7/22/2022 3/25/2022 4/15/2019 4/26/2018   PHQ2 Score 5 1 4 2 6   PHQ9 Score 18 4 16 2 24     Interpretation of Total Score Depression Severity: 1-4 = Minimal depression, 5-9 = Mild depression, 10-14 = Moderate depression, 15-19 = Moderately severe depression, 20-27 = Severe depression      Lab Results   Component Value Date     (L) 07/22/2022    K 3.9 07/22/2022     07/22/2022    CO2 21 07/22/2022    BUN 14 07/22/2022    CREATININE 0.7 07/22/2022    GLUCOSE 127 (H) 07/22/2022    CALCIUM 9.3 07/22/2022    PROT 7.9 03/25/2022    LABALBU 4.8 03/25/2022    BILITOT <0.2 03/25/2022    ALKPHOS 124 03/25/2022    AST 20 03/25/2022    ALT 18 03/25/2022    LABGLOM >60 07/22/2022    GFRAA >60 07/22/2022    AGRATIO 1.5 03/25/2022    GLOB 4.0 10/28/2019         Review of Systems   Constitutional:  Negative for fever. HENT:  Negative for ear pain, rhinorrhea and sore throat. Respiratory:  Positive for shortness of breath. Negative for wheezing. Cardiovascular:  Negative for chest pain and leg swelling. Gastrointestinal:  Negative for abdominal pain and vomiting. Musculoskeletal:  Positive for back pain and neck pain (right sided down to shoulder). Skin:  Negative for rash. Neurological:  Positive for headaches (bilateral temporal). Psychiatric/Behavioral:  Positive for decreased concentration, dysphoric mood and sleep disturbance. Negative for suicidal ideas. The patient is nervous/anxious. Current Outpatient Medications on File Prior to Visit   Medication Sig Dispense Refill    amitriptyline (ELAVIL) 25 MG tablet Take 1 tablet by mouth nightly 30 tablet 5    levalbuterol (XOPENEX HFA) 45 MCG/ACT inhaler Inhale 1 puff into the lungs every 6 hours as needed for Wheezing or Shortness of Breath 1 each 1    FLUoxetine (PROZAC) 20 MG capsule Take 1 capsule by mouth daily for 28 days, THEN 2 capsules daily for 28 days. (Patient not taking: Reported on 8/19/2022) 90 capsule 1     No current facility-administered medications on file prior to visit.      Allergies   Allergen Reactions    Cymbalta [Duloxetine Hcl] Nausea Only    Albuterol Palpitations Past Medical History:   Diagnosis Date    Anxiety     Chronic back pain     Cyst (solitary) of breast     RIGHT BREAST    Depression     Hypertension      Past Surgical History:   Procedure Laterality Date    BACK SURGERY  2016    L3/L4, L4/5 due to injury at work, hx STNA, s/p lumbar cage    CARPAL TUNNEL RELEASE Right     2012    CHOLECYSTECTOMY      [address]    right eye tumors    HYSTERECTOMY (CERVIX STATUS UNKNOWN)      TUBAL LIGATION       Social History     Tobacco Use    Smoking status: Former     Types: Cigarettes     Quit date: 3/6/2009     Years since quittin.4    Smokeless tobacco: Never   Substance Use Topics    Alcohol use: No     Comment: 1 beer a month     Family History   Problem Relation Age of Onset    Heart Disease Mother     High Blood Pressure Mother     Heart Disease Father     High Blood Pressure Father     Heart Disease Paternal Grandfather        Vitals:    22 1047 22 1049   BP: (!) 138/98 138/88   Pulse: 71    Temp: 97.1 °F (36.2 °C)    TempSrc: Infrared    SpO2: 98%    Weight: 196 lb (88.9 kg)      Estimated body mass index is 39.59 kg/m² as calculated from the following:    Height as of 22: 4' 11\" (1.499 m). Weight as of this encounter: 196 lb (88.9 kg). Physical Exam  Vitals reviewed. Constitutional:       Appearance: Normal appearance. HENT:      Head: Normocephalic. Neck:      Vascular: No carotid bruit. Cardiovascular:      Rate and Rhythm: Normal rate and regular rhythm. Pulses: Normal pulses. Carotid pulses are 2+ on the right side and 2+ on the left side. Dorsalis pedis pulses are 2+ on the right side and 2+ on the left side. Posterior tibial pulses are 2+ on the right side and 2+ on the left side. Heart sounds: Normal heart sounds. No murmur heard. No gallop. Pulmonary:      Effort: Pulmonary effort is normal.      Breath sounds: Normal breath sounds. No wheezing or rhonchi. Abdominal:      General: Abdomen is flat. Palpations: Abdomen is soft. Tenderness: There is no abdominal tenderness. Hernia: No hernia is present. Musculoskeletal:         General: Normal range of motion. Cervical back: Normal range of motion. Right lower leg: No edema. Left lower leg: No edema. Lymphadenopathy:      Cervical: No cervical adenopathy. Skin:     General: Skin is warm and dry. Capillary Refill: Capillary refill takes less than 2 seconds. Neurological:      General: No focal deficit present. Mental Status: She is alert and oriented to person, place, and time. Sensory: Sensation is intact. Motor: Motor function is intact. Coordination: Coordination is intact. Gait: Gait is intact. Deep Tendon Reflexes:      Reflex Scores:       Bicep reflexes are 2+ on the right side and 2+ on the left side. Patellar reflexes are 2+ on the right side and 2+ on the left side. Psychiatric:         Mood and Affect: Mood normal.         Behavior: Behavior normal.         Patient's questions answered and concerns addressed. Patient agrees to plan of care.         Electronically signed by KARINA Kim CNP on 8/21/2022 at 8:34 PM

## 2022-08-21 RX ORDER — PREGABALIN 25 MG/1
25 CAPSULE ORAL 3 TIMES DAILY
Qty: 90 CAPSULE | Refills: 1 | Status: SHIPPED | OUTPATIENT
Start: 2022-08-21 | End: 2022-09-20

## 2022-08-21 ASSESSMENT — ENCOUNTER SYMPTOMS
SORE THROAT: 0
VOMITING: 0
RHINORRHEA: 0
WHEEZING: 0
ABDOMINAL PAIN: 0
BACK PAIN: 1
SHORTNESS OF BREATH: 1

## 2022-08-25 NOTE — PROGRESS NOTES
Jairon Llanes  1970, 47 y.o. female   [id number]     Referring Provider: Jamila Lyons MD  Referral Type: In an order in [address] [place] [address]    Reason for Visit: Evaluation of the cause of disorders of hearing, tinnitus, or balance. VESTIBULAR EVALUATION - VIDEONYSTAGMOGRAPHY (VNG)    Jairon Llanes was seen today, 8/26/2022, for videonystagmography (VNG) evaluation. The VNG is an examination of the central vestibulo-ocular pathway that is measured via eye movements. IMPRESSIONS:      Abnormal VNG. Left beating post head shake nystagmus that switched to right with right caloric weakness suggests uncompensated right vestibulopathy. Caloric irrigations revealed right caloric weakness. All other sub test results within normal limits. Follow medical recommendations of Jamila Lyons MD.         See scanned for full report 8/26/2022    VESTIBULAR/AUDIOLOGIC AND PERTINENT MEDICAL HISTORY:      Chief Complaint/Description of Symptoms:   Description: true vertigo, floating, headache, nausea, changes in vision/dots , foggy vision, sometimes feels veering to right. Onset: 2 years  Duration: minute(s)  Frequency: more frequently with more movement   Symptoms better since onset. (Initially constant now more intermittent in nature)     Symptoms alleviated by: Siting down or sitting still     Symptoms made worse by: Rapid movements such as turning quickly or standing up     Patient's current ranking of dizziness/imbalance/unsteadiness on a scale of 0-10 for today: 6/10    Activities that provoke or aggravate symptoms and other associated symptoms:  Positional Changes: standing up from sitting, turning head RIGHT, turning head LEFT, bending down, looking DOWN  Sensory/Gait Disturbances: driving car, scrolling on phone, reading, veering to RIGHT- something quickly passes by   Pressure/Sound Induced Vertigo/Dizziness/Imbalance: No significant factors reported.    Psychological Factors: anxiety, panic attacks, depression- tightness in chest. Patient states symptoms have worsened since dizziness   Cardiovascular Factors: No significant factors reported. Notably, patient reports mother passed away from heart attack. Previous History of Dizziness:    Previous VNG or balance assessment: no   Previous balance therapy: no    Neck Pain/Stiffness (Orthopedic) No significant factors reported. Neurological Factors:  Patient reports back surgery 7 years ago, and notes tingling/pain in legs more on right side that started before dizzy symptoms     Audiologic/Otologic History:  Last audiogram: 08/17/22, Results: symmetric sensorineural hearing loss with excellent word recognition, bilaterally. She notes recent crackling/popping and intermittent tinnitus with fluctuating hearing loss mostly in the right ear. Vision History:  Glasses: none. Contacts: none. Vision problems:  Patient reports history of several surgeries on right eye for cyst several times from [age] years old     Headache/Migraine History:  positive for headache   Headache/migraine frequency and duration: primarily in evening that mostly improves with medication (every day for the past 3 days prior about 1x per week; however significant history of migraines during pregnancy)  Pain intensity: moderate  Localize: no  Headache every day?: Yes, everyday for the past 3 days but prior about 1x per week  Take OTC medications more than 4 days per week?: no  Associated Headache/Migraine Symptoms:  photophobia  Aura (before/during): twitching in eye sometimes (left eye mostly)   Post-drome (after attack): dizziness  Headache/Migraine Triggers:  Hormonal: fluctuations in estrogen- during pregnancy   Food: No significant factors reported. Food Additives: No significant factors reported. Drinks: No significant factors reported. Stress: anger and stress  Changes in sleep: No significant factors reported. Physical exertion: No significant factors reported.    Environmental: No significant factors reported. Medications: No significant factors reported. Fall Risk History: Patient notes significant history of falls from dizziness. Head trauma 12 years ago. Number of falls in past 12 months: 3- most recent about 1 weeks  Fall injury?: No    Family History:   Family History   Problem Relation Age of Onset    Heart Disease Mother     High Blood Pressure Mother     Heart Disease Father     High Blood Pressure Father     Heart Disease Paternal Grandfather      Positive for:  None    Medical History:  Patient Active Problem List   Diagnosis    Sciatica    Chest pain    Essential hypertension    Fibromyalgia    Depression with anxiety    Plantar fasciitis of right foot    Abnormal weight gain    Asthma    Calcaneal spur of right foot    Chronic diarrhea    DDD (degenerative disc disease), lumbosacral    Esophageal reflux    Hematochezia    Hematuria, microscopic    Left breast lump    Lumbar herniated disc    Nausea    Numbness of right anterior thigh    Obesity (BMI 30-39. 9)    Osteoarthritis of spine with radiculopathy, thoracolumbar region    Panic attacks    Spondylolisthesis    Vitamin D deficiency       Imaging: Patient denies recent imaging. Medication, Drugs, Alcohol:n the past 48 hours: none  Patient reported adhering to pre-test instructions: No    Date: 8/26/2022     TESTS COMPLETED AND RESULTS:      QUESTIONAIRES:  Dizziness Handicap Inventory: Patient's score =       0-14 = No handicap     16-34 = Mild handicap     36-52 = Moderate handicap     54+ = Severe handicap    SCREENERS:   - VERTEBRAL ARTERY (VAST): Normal   - CERVICAL VERTIGO (CVST): Normal    GAZE:   - DOWN: With Fixation: Normal, Without Fixation: Normal   - UP:  With Fixation: Normal, Without Fixation: Normal   - LEFT: With Fixation: Normal, Without Fixation: Normal   - RIGHT: With Fixation: Normal, Without Fixation: Normal    SACCADES: Normal     SMOOTH PURSUIT: Normal    OPTOKINETICS: Normal    SPONTANEOUS NYSTAGMUS: Absent    - LEFT: With Fixation: Absent , Without Fixation: Absent    - CENTER: With Fixation: Absent , Without Fixation: Absent    - RIGHT: With Fixation: Absent , Without Fixation: Absent     HORIZONAL HEAD SHAKE TEST: Normal    ЕКАТЕРИНА-HALLPIKE:   - HEAD LEFT: Normal   - HEAD RIGHT: Normal    HEAD POSITIONALS, SUPINE POSITION 30 DEGREES:   - HEAD RIGHT: Normal   - HEAD CENTER/ PRE-CALORIC: Normal   - HEAD LEFT: Normal    BODY POSITIONALS:   - BODY RIGHT: Normal   - BODY CENTER: Normal   - BODY LEFT: Normal    BITHERMAL CALORIC IRRIGATIONS: Bithermal caloric irrigations did not reveal a significant caloric weakness or directional preponderance. UW: RIGHT 38%               DP: LEFT 19%  stronger              Hypofunction: NO    STIMULATION Cool Right Cool Left Warm Right Warm Left   PEAK  6 7 4 10       PATIENT EDUCATION:      Reviewed purpose of testing completed today, general vestibular system function, and results obtained today. Educational information was shared verbally with patient    RECOMMENDATIONS:       - Continue medical follow-up with Sebas Burks MD.   - Retest hearing as medically indicated and/or sooner if a change in hearing is noted.       Medina Loomis  Audiologist      Chart CC'd to: Sebas Burks MD

## 2022-08-26 ENCOUNTER — PROCEDURE VISIT (OUTPATIENT)
Dept: AUDIOLOGY | Age: 52
End: 2022-08-26
Payer: COMMERCIAL

## 2022-08-26 DIAGNOSIS — R42 DIZZINESS AND GIDDINESS: ICD-10-CM

## 2022-08-26 DIAGNOSIS — H90.3 SENSORINEURAL HEARING LOSS, BILATERAL: Primary | ICD-10-CM

## 2022-08-26 DIAGNOSIS — H93.13 TINNITUS, BILATERAL: ICD-10-CM

## 2022-08-26 PROCEDURE — 92537 CALORIC VSTBLR TEST W/REC: CPT | Performed by: AUDIOLOGIST

## 2022-08-26 PROCEDURE — 92540 BASIC VESTIBULAR EVALUATION: CPT | Performed by: AUDIOLOGIST

## 2022-08-26 NOTE — Clinical Note
Dr. Vanessa Munoz,  Thank you for your referral for VNG testing on this patient. Today's results revealed abnormal VNG. Left beating post head shake nystagmus that switched to right with right caloric weakness suggests uncompensated right vestibulopathy. Caloric irrigations revealed right caloric weakness. All other sub test results within normal limits. Please see the scanned VNG (under \"Media\" tab) and encounter note for details. If you have any questions, or if there is anything else you need, please let me know.    Nechama Severance, AuD Audiologist --- [address] [place] [address] ENT - Audiology

## 2022-08-30 ENCOUNTER — HOSPITAL ENCOUNTER (OUTPATIENT)
Age: 52
Discharge: HOME | End: 2022-08-30
Payer: COMMERCIAL

## 2022-08-30 ENCOUNTER — HOSPITAL ENCOUNTER (OUTPATIENT)
Dept: MRI IMAGING | Age: 52
Discharge: HOME | End: 2022-08-30
Payer: COMMERCIAL

## 2022-08-30 DIAGNOSIS — R42 VERTIGO: ICD-10-CM

## 2022-08-30 LAB
BUN BLDV-MCNC: 10 MG/DL (ref 7–20)
CREAT SERPL-MCNC: 0.7 MG/DL (ref 0.6–1.1)
GFR AFRICAN AMERICAN: >60
GFR NON-AFRICAN AMERICAN: >60

## 2022-08-30 PROCEDURE — A9579 GAD-BASE MR CONTRAST NOS,1ML: HCPCS | Performed by: OTOLARYNGOLOGY

## 2022-08-30 PROCEDURE — 84520 ASSAY OF UREA NITROGEN: CPT

## 2022-08-30 PROCEDURE — 70553 MRI BRAIN STEM W/O & W/DYE: CPT

## 2022-08-30 PROCEDURE — 6360000004 HC RX CONTRAST MEDICATION: Performed by: OTOLARYNGOLOGY

## 2022-08-30 PROCEDURE — 36415 COLL VENOUS BLD VENIPUNCTURE: CPT

## 2022-08-30 PROCEDURE — 82565 ASSAY OF CREATININE: CPT

## 2022-08-30 RX ADMIN — GADOTERIDOL 18 ML: 279.3 INJECTION, SOLUTION INTRAVENOUS at 10:22

## 2022-09-02 ENCOUNTER — TELEPHONE (OUTPATIENT)
Dept: OTOLARYNGOLOGY | Age: 52
End: 2022-09-02

## 2022-09-02 NOTE — TELEPHONE ENCOUNTER
Called patient, attempted to communicate results of MRI imaging and VNG testing - suspect right peripheral vestibulopathy. Have asker her to call back for discussion. Consent Type: Consent 1 (Standard)

## 2022-09-06 NOTE — TELEPHONE ENCOUNTER
Call twice - patient is having issues w her phone. The first time u call if it cuts out please call right back and it will go thru. [phone] (Home)    Shes called the phone company she doesn't know why its doing this. There was no alternate number she could provide. Provider unavailable at the time of call. Does she need to come in for results discussion?

## 2022-09-07 ENCOUNTER — TELEPHONE (OUTPATIENT)
Dept: FAMILY MEDICINE CLINIC | Age: 52
End: 2022-09-07

## 2022-09-07 NOTE — TELEPHONE ENCOUNTER
Patient had an Mri done. She says that the mri shows abnormalities and that she was instructed to call Dr Yumiko Skinner. She has tried to call Dr Yumiko Skinner three times and left messages for him to call but he has not responded. Are we able to call Dr Cari Quezada office and get the results or have him call patient. Does not know what to do.

## 2022-09-13 ENCOUNTER — OFFICE VISIT (OUTPATIENT)
Dept: FAMILY MEDICINE CLINIC | Age: 52
End: 2022-09-13
Payer: COMMERCIAL

## 2022-09-13 VITALS
HEART RATE: 80 BPM | SYSTOLIC BLOOD PRESSURE: 120 MMHG | WEIGHT: 198 LBS | TEMPERATURE: 97.1 F | BODY MASS INDEX: 39.99 KG/M2 | DIASTOLIC BLOOD PRESSURE: 82 MMHG | OXYGEN SATURATION: 97 %

## 2022-09-13 DIAGNOSIS — M54.42 CHRONIC BILATERAL LOW BACK PAIN WITH BILATERAL SCIATICA: ICD-10-CM

## 2022-09-13 DIAGNOSIS — M54.41 CHRONIC BILATERAL LOW BACK PAIN WITH BILATERAL SCIATICA: ICD-10-CM

## 2022-09-13 DIAGNOSIS — M47.25 OSTEOARTHRITIS OF SPINE WITH RADICULOPATHY, THORACOLUMBAR REGION: ICD-10-CM

## 2022-09-13 DIAGNOSIS — I10 ESSENTIAL HYPERTENSION: ICD-10-CM

## 2022-09-13 DIAGNOSIS — F33.2 SEVERE EPISODE OF RECURRENT MAJOR DEPRESSIVE DISORDER, WITHOUT PSYCHOTIC FEATURES (HCC): ICD-10-CM

## 2022-09-13 DIAGNOSIS — H93.13 TINNITUS OF BOTH EARS: ICD-10-CM

## 2022-09-13 DIAGNOSIS — R42 PERSISTENT POSTURAL-PERCEPTUAL DIZZINESS: ICD-10-CM

## 2022-09-13 DIAGNOSIS — M51.37 DDD (DEGENERATIVE DISC DISEASE), LUMBOSACRAL: ICD-10-CM

## 2022-09-13 DIAGNOSIS — F41.1 GAD (GENERALIZED ANXIETY DISORDER): Primary | ICD-10-CM

## 2022-09-13 DIAGNOSIS — G89.29 CHRONIC BILATERAL LOW BACK PAIN WITH BILATERAL SCIATICA: ICD-10-CM

## 2022-09-13 PROBLEM — H81.8X9 PERSISTENT POSTURAL-PERCEPTUAL DIZZINESS: Status: ACTIVE | Noted: 2022-09-13

## 2022-09-13 PROCEDURE — 1036F TOBACCO NON-USER: CPT | Performed by: NURSE PRACTITIONER

## 2022-09-13 PROCEDURE — 3017F COLORECTAL CA SCREEN DOC REV: CPT | Performed by: NURSE PRACTITIONER

## 2022-09-13 PROCEDURE — G8427 DOCREV CUR MEDS BY ELIG CLIN: HCPCS | Performed by: NURSE PRACTITIONER

## 2022-09-13 PROCEDURE — G8417 CALC BMI ABV UP PARAM F/U: HCPCS | Performed by: NURSE PRACTITIONER

## 2022-09-13 PROCEDURE — 99213 OFFICE O/P EST LOW 20 MIN: CPT | Performed by: NURSE PRACTITIONER

## 2022-09-13 RX ORDER — DIAZEPAM 2 MG/1
TABLET ORAL
Qty: 5 TABLET | Refills: 0 | Status: SHIPPED | OUTPATIENT
Start: 2022-09-13 | End: 2022-09-20

## 2022-09-13 ASSESSMENT — PATIENT HEALTH QUESTIONNAIRE - PHQ9
9. THOUGHTS THAT YOU WOULD BE BETTER OFF DEAD, OR OF HURTING YOURSELF: 0
6. FEELING BAD ABOUT YOURSELF - OR THAT YOU ARE A FAILURE OR HAVE LET YOURSELF OR YOUR FAMILY DOWN: 2
7. TROUBLE CONCENTRATING ON THINGS, SUCH AS READING THE NEWSPAPER OR WATCHING TELEVISION: 3
4. FEELING TIRED OR HAVING LITTLE ENERGY: 3
SUM OF ALL RESPONSES TO PHQ QUESTIONS 1-9: 23
SUM OF ALL RESPONSES TO PHQ QUESTIONS 1-9: 23
3. TROUBLE FALLING OR STAYING ASLEEP: 3
8. MOVING OR SPEAKING SO SLOWLY THAT OTHER PEOPLE COULD HAVE NOTICED. OR THE OPPOSITE, BEING SO FIGETY OR RESTLESS THAT YOU HAVE BEEN MOVING AROUND A LOT MORE THAN USUAL: 3
SUM OF ALL RESPONSES TO PHQ QUESTIONS 1-9: 23
2. FEELING DOWN, DEPRESSED OR HOPELESS: 3
5. POOR APPETITE OR OVEREATING: 3
SUM OF ALL RESPONSES TO PHQ9 QUESTIONS 1 & 2: 6
SUM OF ALL RESPONSES TO PHQ QUESTIONS 1-9: 23
1. LITTLE INTEREST OR PLEASURE IN DOING THINGS: 3
10. IF YOU CHECKED OFF ANY PROBLEMS, HOW DIFFICULT HAVE THESE PROBLEMS MADE IT FOR YOU TO DO YOUR WORK, TAKE CARE OF THINGS AT HOME, OR GET ALONG WITH OTHER PEOPLE: 3

## 2022-09-13 ASSESSMENT — ANXIETY QUESTIONNAIRES
1. FEELING NERVOUS, ANXIOUS, OR ON EDGE: 3
GAD7 TOTAL SCORE: 21
2. NOT BEING ABLE TO STOP OR CONTROL WORRYING: 3
7. FEELING AFRAID AS IF SOMETHING AWFUL MIGHT HAPPEN: 3
3. WORRYING TOO MUCH ABOUT DIFFERENT THINGS: 3
6. BECOMING EASILY ANNOYED OR IRRITABLE: 3
4. TROUBLE RELAXING: 3
5. BEING SO RESTLESS THAT IT IS HARD TO SIT STILL: 3

## 2022-09-13 ASSESSMENT — ENCOUNTER SYMPTOMS
WHEEZING: 0
ABDOMINAL PAIN: 0
SORE THROAT: 0
RHINORRHEA: 0
VOMITING: 0
BACK PAIN: 1
SHORTNESS OF BREATH: 1

## 2022-09-13 ASSESSMENT — COLUMBIA-SUICIDE SEVERITY RATING SCALE - C-SSRS
6. HAVE YOU EVER DONE ANYTHING, STARTED TO DO ANYTHING, OR PREPARED TO DO ANYTHING TO END YOUR LIFE?: NO
2. HAVE YOU ACTUALLY HAD ANY THOUGHTS OF KILLING YOURSELF?: NO
1. WITHIN THE PAST MONTH, HAVE YOU WISHED YOU WERE DEAD OR WISHED YOU COULD GO TO SLEEP AND NOT WAKE UP?: NO

## 2022-09-13 NOTE — PROGRESS NOTES
9/13/2022    Chief Complaint   Patient presents with    Back Pain     Lyrica has really helped with the pain     Depression     Prozac is doing ok     Dizziness     Still having issues and this morning is really bad     Tinnitus     Started Saturday evening, she could not sleep last night because of it     Nausea    Panic Attack     Overwhelmed with her health and having her grandchildren        Maxwell Gunn is a 47 y.o. female, presents today for:      ASSESSMENT/PLAN:  1. JOSETTE (generalized anxiety disorder)  >Increase Prozac today as previously instructed by ENT to 40 mg. Tolerating well but anxiety/ depression continues to be uncontrolled. Due to severe anxiety will give PRN Valium (pt has been on Xanax in past but understands I do not given long term). May need to add Buspirone in one month if no improvement in anxiety/ depression. Hopeful increased dose of Prozac will also help sleep. >Has now weaned off Amitriptyline  - diazePAM (VALIUM) 2 MG tablet; Take 1/2- 1 tablet every 12 hours as needed for anxiety  Dispense: 5 tablet; Refill: 0    2. Severe episode of recurrent major depressive disorder, without psychotic features ([address]  See notes    3. Osteoarthritis of spine with radiculopathy, thoracolumbar region  Improved with Lyrica, continue current dosing  Continue care with [facility]    4. Essential hypertension  Stable today, continue to monitor    5. Persistent postural-perceptual dizziness  Continue care with ENT  Encouraged to participate in Vestibular therapy    6. Tinnitus of both ears  See above      Return in about 3 months (around 12/13/2022). Presenting today for chronic disease follow up. Vertigo: New diagnosis of Persistent postural-perceptual dizziness 8/2022. Started Prozac last month per ENT recommendation- no improvement as of yet. Not having weird dreams as she previously did with Prozac. Severe dizziness starting on Thursday. Then had severe tinnitus.   On Friday felt slightly better with better relaxation. Last Friday/ Saturday am had severe headache with severe ringing noise. Yesterday, felt like she was on edge and was extremely anxious. Today continues to sound like cicadas with ringing. Anxiety/ Depression: Having severe anxiety and depression. [place] to be extremely stressed with the care of her grandson. She babysits them throughout the day and when they are not in school. Currently going to court to get grandkids. Having worsening difficulty sleeping. Intermittent chest tightness with SOB lasting throughout the day when she is feeling extremely anxious. Now off amitripytline, on prozac. Does not feel it is helping. Lexapro did nothing for the depression. Wellbutrin- did help. Chronic Back Pain: Improved with Lyrica started last month in order to wean off Amitriptyline. Is having more difficulty sleeping since stopping Amitriptyline. Continues to have moderate to severe chronic neck and back with left sided raddiculopathy and bilateral leg parasthesias. Primarily located on left side of back, bilateral legs wrapping around to anterior thigh, and her feet. No recent falls. Autoimmune w/u has been negative. Attempted chiropractic care in 2021 without resolution of symptoms. Prior meds: Gabapentin with little relief and Cymbalta with severe nausea. No recent back or neck imaging. Last MRI was 6/2016. Long standing history of back pain resulting in 2016 a L4-5 and L5-S1 ALIF with posterior pedicle screw fixation with Dr. Rafi Freeman at [address] Pain worse with standing, walking, and laying flat. Evaluated by [facility] 9/2021 who were concerned about neurogenic claudication with Lumbar MRI WO Contrast ordered however was denied by insurance. Concerned about decreased strength on entire left side. Prior brain mri.  ? TIA.  ? Microvessel disease. Also severe pain on left side extremities.   No prior Neuro eval.  Not on statins for TIA. No recent labs. No prior carpel tunnel release. Prior Meds: Tramadol (slight relief), Gabapentin (mild relief), Cymbalta (nausea)  Chronic Neck Pain: Primarily left sided with radiculopathy down to left hand. Reports decreased strength on left arm resulting in decreased ability to lift and hold objects >25 lbs for extended periods of time. Last imaging CT Spine WO Contrast 7/2019 showing no acute osseous abnormality. No prior MRI. EMG 4/19 showing right ulnar neuropathy. PHQ-9  9/13/2022   Little interest or pleasure in doing things 3   Feeling down, depressed, or hopeless 3   Trouble falling or staying asleep, or sleeping too much 3   Feeling tired or having little energy 3   Poor appetite or overeating 3   Feeling bad about yourself - or that you are a failure or have let yourself or your family down 2   Trouble concentrating on things, such as reading the newspaper or watching television 3   Moving or speaking so slowly that other people could have noticed. Or the opposite - being so fidgety or restless that you have been moving around a lot more than usual 3   Thoughts that you would be better off dead, or of hurting yourself in some way 0   PHQ-2 Score 6   PHQ-9 Total Score 23   If you checked off any problems, how difficult have these problems made it for you to do your work, take care of things at home, or get along with other people?  3         PHQ Scores 9/13/2022 8/19/2022 7/22/2022 3/25/2022 4/15/2019 4/26/2018   PHQ2 Score 6 5 1 4 2 6   PHQ9 Score 23 18 4 16 2 24     Interpretation of Total Score Depression Severity: 1-4 = Minimal depression, 5-9 = Mild depression, 10-14 = Moderate depression, 15-19 = Moderately severe depression, 20-27 = Severe depression    JOSETTE-7 SCREENING 9/13/2022   Feeling nervous, anxious, or on edge Nearly every day   Not being able to stop or control worrying Nearly every day   Worrying too much about different things Nearly every day   Trouble relaxing Nearly every day   Being so restless that it is hard to sit still Nearly every day   Becoming easily annoyed or irritable Nearly every day   Feeling afraid as if something awful might happen Nearly every day   JOSETTE-7 Total Score 21         JOSETTE 7 SCORE 9/13/2022   JOSETTE-7 Total Score 21     Interpretation of JOSETTE-7 score: 5-9 = mild anxiety, 10-14 = moderate anxiety, 15+ = severe anxiety. Recommend referral to behavioral health for scores 10 or greater. Review of Systems   Constitutional:  Negative for fever. HENT:  Negative for ear pain, rhinorrhea and sore throat. Respiratory:  Positive for shortness of breath. Negative for wheezing. Cardiovascular:  Negative for chest pain and leg swelling. Gastrointestinal:  Negative for abdominal pain and vomiting. Musculoskeletal:  Positive for back pain and neck pain (right sided down to shoulder). Skin:  Negative for rash. Neurological:  Positive for headaches (bilateral temporal). Psychiatric/Behavioral:  Positive for decreased concentration, dysphoric mood and sleep disturbance. Negative for suicidal ideas. The patient is nervous/anxious. Current Outpatient Medications on File Prior to Visit   Medication Sig Dispense Refill    pregabalin (LYRICA) 25 MG capsule Take 1 capsule by mouth 3 times daily for 30 days. 90 capsule 1    FLUoxetine (PROZAC) 20 MG capsule Take 1 capsule by mouth daily for 28 days, THEN 2 capsules daily for 28 days. 90 capsule 1    levalbuterol (XOPENEX HFA) 45 MCG/ACT inhaler Inhale 1 puff into the lungs every 6 hours as needed for Wheezing or Shortness of Breath 1 each 1     No current facility-administered medications on file prior to visit.      Allergies   Allergen Reactions    Cymbalta [Duloxetine Hcl] Nausea Only    Albuterol Palpitations     Past Medical History:   Diagnosis Date    Anxiety     Chronic back pain     Cyst (solitary) of breast     RIGHT BREAST    Depression     Hypertension      Past Surgical History:   Procedure Laterality Date    BACK SURGERY  2016    L3/L4, L4/5 due to injury at work, hx STNA, s/p lumbar cage    CARPAL TUNNEL RELEASE Right     2012    CHOLECYSTECTOMY  2005    [address]    right eye tumors    HYSTERECTOMY (CERVIX STATUS UNKNOWN)  2000    TUBAL LIGATION       Social History     Tobacco Use    Smoking status: Former     Types: Cigarettes     Quit date: 3/6/2009     Years since quittin.5    Smokeless tobacco: Never   Substance Use Topics    Alcohol use: No     Comment: 1 beer a month     Family History   Problem Relation Age of Onset    Heart Disease Mother     High Blood Pressure Mother     Heart Disease Father     High Blood Pressure Father     Heart Disease Paternal Grandfather        Vitals:    22 0844   BP: 120/82   Pulse: 80   Temp: 97.1 °F (36.2 °C)   TempSrc: Infrared   SpO2: 97%   Weight: 198 lb (89.8 kg)     Estimated body mass index is 39.99 kg/m² as calculated from the following:    Height as of 22: 4' 11\" (1.499 m). Weight as of this encounter: 198 lb (89.8 kg). Physical Exam  Vitals reviewed. Constitutional:       Appearance: Normal appearance. HENT:      Head: Normocephalic. Neck:      Vascular: No carotid bruit. Cardiovascular:      Rate and Rhythm: Normal rate and regular rhythm. Pulses: Normal pulses. Carotid pulses are 2+ on the right side and 2+ on the left side. Dorsalis pedis pulses are 2+ on the right side and 2+ on the left side. Posterior tibial pulses are 2+ on the right side and 2+ on the left side. Heart sounds: Normal heart sounds. No murmur heard. No gallop. Pulmonary:      Effort: Pulmonary effort is normal.      Breath sounds: Normal breath sounds. No wheezing or rhonchi. Abdominal:      General: Abdomen is flat. Palpations: Abdomen is soft. Tenderness: There is no abdominal tenderness. Hernia: No hernia is present. Musculoskeletal:         General: Normal range of motion. Cervical back: Normal range of motion. Right lower leg: No edema. Left lower leg: No edema. Lymphadenopathy:      Cervical: No cervical adenopathy. Skin:     General: Skin is warm and dry. Capillary Refill: Capillary refill takes less than 2 seconds. Neurological:      General: No focal deficit present. Mental Status: She is alert and oriented to person, place, and time. Sensory: Sensation is intact. Motor: Motor function is intact. Coordination: Coordination is intact. Gait: Gait is intact. Deep Tendon Reflexes:      Reflex Scores:       Bicep reflexes are 2+ on the right side and 2+ on the left side. Patellar reflexes are 2+ on the right side and 2+ on the left side. Comments: Bilateral hand action fine motor tremor noted     Psychiatric:         Mood and Affect: Mood normal.         Behavior: Behavior normal.         Patient's questions answered and concerns addressed. Patient agrees to plan of care.         Electronically signed by KARINA Sharp CNP on 9/13/2022 at 9:24 AM

## 2022-09-14 ENCOUNTER — TELEPHONE (OUTPATIENT)
Dept: ENT CLINIC | Age: 52
End: 2022-09-14

## 2022-09-14 NOTE — TELEPHONE ENCOUNTER
Called patient - began fluoxetine/prozac for the prior 3 weeks. On 9/10/2022 began to have left sided tinnitus, otalgia and as had significant nausea, tremors, throbbing headaches. Today she states milder tremors, no headache or ear pain, dizziness is 65% improved - concern for side-effects from fluoxetine - will have her continue fluoxetine and she will call / send Lindsey Shell message in 1 week. If tremors or headache worsen, we have asked her to please communicate this and we will stop the SSRI.

## 2022-09-21 ENCOUNTER — PATIENT MESSAGE (OUTPATIENT)
Dept: FAMILY MEDICINE CLINIC | Age: 52
End: 2022-09-21

## 2022-09-21 DIAGNOSIS — F41.1 GAD (GENERALIZED ANXIETY DISORDER): Primary | ICD-10-CM

## 2022-09-21 DIAGNOSIS — R42 PERSISTENT POSTURAL-PERCEPTUAL DIZZINESS: ICD-10-CM

## 2022-09-21 NOTE — TELEPHONE ENCOUNTER
From: Leatha Campos  To: Michelle Chavez  Sent: 9/21/2022 1:51 PM EDT  Subject: Valium prescription     Hi Maricelhazelmerry Sally I am almost out of the Valium you gave me and I must say they truly help with the ringing and the anxiety. They actually work better than the Prozac so I need to know what our next step is with treatment? I believe you said you couldnt prescribe me Valium for daily use so what could I get as a alternative that works as good.  Thanks

## 2022-09-23 NOTE — TELEPHONE ENCOUNTER
Dr. Richy Whitfield- please see patient Mychart message regarding Valium. I was planning on continuing it for right now while we are waiting for the higher dose of Fluoxetine to start working but I wanted to make sure it was OK with you? Below is the original message. Thanks- Ishmael Triplett I am almost out of the Valium you gave me and I must say they truly help with the ringing and the anxiety. They actually work better than the Prozac so I need to know what our next step is with treatment? I believe you said you couldnt prescribe me Valium for daily use so what could I get as a alternative that works as good.  Thanks

## 2022-09-27 RX ORDER — DIAZEPAM 2 MG/1
TABLET ORAL
Qty: 45 TABLET | Refills: 1 | Status: SHIPPED | OUTPATIENT
Start: 2022-09-27 | End: 2022-09-27

## 2022-10-10 ENCOUNTER — TELEPHONE (OUTPATIENT)
Dept: FAMILY MEDICINE CLINIC | Age: 52
End: 2022-10-10

## 2022-10-10 DIAGNOSIS — M25.532 LEFT WRIST PAIN: ICD-10-CM

## 2022-10-10 DIAGNOSIS — M79.642 LEFT HAND PAIN: Primary | ICD-10-CM

## 2022-10-10 NOTE — TELEPHONE ENCOUNTER
Patient is requesting order for xray of her left hand. States that she fell last night and it is swollen and bruised with one area with a small knot, unable to  anything with it but can still move all her fingers. Please advise.

## 2022-10-18 ENCOUNTER — OFFICE VISIT (OUTPATIENT)
Dept: ENT CLINIC | Age: 52
End: 2022-10-18
Payer: COMMERCIAL

## 2022-10-18 ENCOUNTER — TELEPHONE (OUTPATIENT)
Dept: ENT CLINIC | Age: 52
End: 2022-10-18

## 2022-10-18 VITALS — TEMPERATURE: 97.3 F | HEIGHT: 55 IN | WEIGHT: 198 LBS | BODY MASS INDEX: 45.82 KG/M2

## 2022-10-18 DIAGNOSIS — R42 VERTIGO: ICD-10-CM

## 2022-10-18 DIAGNOSIS — H93.13 TINNITUS AURIUM, BILATERAL: Primary | ICD-10-CM

## 2022-10-18 PROCEDURE — G8427 DOCREV CUR MEDS BY ELIG CLIN: HCPCS | Performed by: OTOLARYNGOLOGY

## 2022-10-18 PROCEDURE — G8484 FLU IMMUNIZE NO ADMIN: HCPCS | Performed by: OTOLARYNGOLOGY

## 2022-10-18 PROCEDURE — G8417 CALC BMI ABV UP PARAM F/U: HCPCS | Performed by: OTOLARYNGOLOGY

## 2022-10-18 PROCEDURE — 99214 OFFICE O/P EST MOD 30 MIN: CPT | Performed by: OTOLARYNGOLOGY

## 2022-10-18 PROCEDURE — 1036F TOBACCO NON-USER: CPT | Performed by: OTOLARYNGOLOGY

## 2022-10-18 PROCEDURE — 3017F COLORECTAL CA SCREEN DOC REV: CPT | Performed by: OTOLARYNGOLOGY

## 2022-10-18 RX ORDER — FLUOXETINE HYDROCHLORIDE 20 MG/1
40 CAPSULE ORAL DAILY
Qty: 180 CAPSULE | Refills: 1 | Status: SHIPPED | OUTPATIENT
Start: 2022-10-18 | End: 2023-01-16

## 2022-10-18 RX ORDER — ATORVASTATIN CALCIUM 10 MG/1
TABLET, FILM COATED ORAL
COMMUNITY

## 2022-10-18 RX ORDER — DIAZEPAM 2 MG/1
2 TABLET ORAL EVERY 6 HOURS PRN
COMMUNITY

## 2022-10-18 ASSESSMENT — ENCOUNTER SYMPTOMS
ABDOMINAL PAIN: 0
WHEEZING: 0
SHORTNESS OF BREATH: 0

## 2022-10-18 NOTE — Clinical Note
Ms. Jose Mars has had improvement in vertigo while on SSRIs, but she continues to have loud tinnitus especially if she's stressed. I'm planning to continue her SSRIs as it's only been 2 months. Let me know if there's a way she can see a behavioral health specialist, as I think this would help with her anxiety.   Ezequiel Gonzalez

## 2022-10-18 NOTE — PATIENT INSTRUCTIONS
Temporomandibular Joint Arthralgia (suspected) guidelines:  -Ibuprofen 600mg q8h x 7 days (may exacerbate LPR/gastroesophageal reflux)  -Soft non chewing diet x 7 days  -Warm compresses every 3-4 hours to the left temporomandibular joint x 7 days  -Consider use of mouthguard to prevent nocturnal bruxism (teeth grinding at night)

## 2022-10-18 NOTE — PROGRESS NOTES
[address], [facility], [address] [place] [address]  P: [phone]       Patient     López Dey  1970    Chief Concern     Chief Complaint   Patient presents with    Follow-up     Patient is here today for a follow up of dizziness. Patient states she has no questions or concerns as of right now. Assessment and Plan      Diagnosis Orders   1. Tinnitus aurium, bilateral        2. Vertigo  FLUoxetine (PROZAC) 20 MG capsule        67-year-old female with 2 years of persistent dizziness, worse with positioning and when upright, with a sensation of floating, with devi spinning vertigo with postural changes. Examination including Mariaa-Hallpike testing and head thrust testing without devi nystagmus however significant disequilibrium/anxiety and nausea. VNG with suspected uncompensated right vesibulopathy, MRI without CPA lesions. Has had improvement in vertigo since starting SSRIs, vertigo with decreased intensity and does not always occur with positional changes. Continues to have a \"floating\" feeling especially when in a moving car. Will continue SSRIs, refer her for tinnitus retraining therapy when this becomes available. Return in about 3 months (around 1/18/2023). History of Present Illness     Patient with concern for dizziness. She states that 2 years ago when she bent over and looked up she had spinning vertigo along with \"seeing dots\" that lasted approximately 3-4 minutes, with subsequent dysequilibrium and nausea and the sensation of \"floating\". Feels more stable lying down although the transition to lying from sitting causes dysequilibrium. When she goes around a curve she feels she is still floating. Occasionally feels anxious due to this condition, and has had falls before due to this. Has history of depression and is on amitriptyline 05/2022 - states that this has helped slightly but when she is angry/anxious she gets significant dysequilibrium. Denies caffeine use, drinks Gatorade and lemonade only. Interval History 10/18/2022: Continuing prozac 20mg BID, believes that the \"floating feeling\" has not improved, however the feeling of spinning when she lies down has significantly improved - no longer all the the time, has occurred 1-2x in the past week with decreased intensity. Believes that the tinnitus in her ears initially improved on prozac, but then on  she had worse tinnitus which is always in the right ear but occasionally bilateral (especially when \"I get worked up\"), with occasional ear fullness. Has been on lyrica for chronic pain which significantly improves symptoms.      Past Medical History     Past Medical History:   Diagnosis Date    Anxiety     Chronic back pain     Cyst (solitary) of breast     RIGHT BREAST    Depression     Hypertension        Past Surgical History     Past Surgical History:   Procedure Laterality Date    BACK SURGERY  2016    L3/L4, L4/5 due to injury at work, hx STNA, s/p lumbar cage    CARPAL TUNNEL RELEASE Right     2012    CHOLECYSTECTOMY      [address]    right eye tumors    HYSTERECTOMY (CERVIX STATUS UNKNOWN)      TUBAL LIGATION         Family History     Family History   Problem Relation Age of Onset    Heart Disease Mother     High Blood Pressure Mother     Heart Disease Father     High Blood Pressure Father     Heart Disease Paternal Grandfather        Social History     Social History     Tobacco Use    Smoking status: Former     Types: Cigarettes     Quit date: 3/6/2009     Years since quittin.6    Smokeless tobacco: Never   Substance Use Topics    Alcohol use: No     Comment: 1 beer a month    Drug use: No        Allergies     Allergies   Allergen Reactions    Cymbalta [Duloxetine Hcl] Nausea Only    Albuterol Palpitations       Medications     Current Outpatient Medications   Medication Sig Dispense Refill    atorvastatin (LIPITOR) 10 MG tablet atorvastatin 10 mg tablet diazePAM (VALIUM) 2 MG tablet Take 2 mg by mouth every 6 hours as needed for Anxiety. Take 1/2 tablet      FLUoxetine (PROZAC) 20 MG capsule Take 2 capsules by mouth daily 180 capsule 1    levalbuterol (XOPENEX HFA) 45 MCG/ACT inhaler Inhale 1 puff into the lungs every 6 hours as needed for Wheezing or Shortness of Breath 1 each 1    pregabalin (LYRICA) 25 MG capsule Take 1 capsule by mouth 3 times daily for 30 days. 90 capsule 1     No current facility-administered medications for this visit. Review of Systems     Review of Systems   Constitutional:  Negative for activity change, chills, diaphoresis, fatigue and fever. HENT:  Negative for congestion, hearing loss and tinnitus. Eyes:  Negative for visual disturbance. Respiratory:  Negative for shortness of breath and wheezing. Cardiovascular:  Negative for chest pain. Gastrointestinal:  Negative for abdominal pain. Musculoskeletal:  Negative for neck pain and neck stiffness. Skin:  Negative for rash. Neurological:  Positive for dizziness and light-headedness. Negative for tremors, syncope, speech difficulty, weakness and headaches. Hematological:  Negative for adenopathy. Psychiatric/Behavioral:  Positive for agitation. Negative for confusion. PhysicalExam     Vitals:    10/18/22 1015   Temp: 97.3 °F (36.3 °C)   Weight: 198 lb (89.8 kg)   Height: 4' 1\" (1.245 m)       Physical Exam  Vitals reviewed. Constitutional:       Appearance: Normal appearance. HENT:      Head: Normocephalic and atraumatic. Right Ear: Tympanic membrane, ear canal and external ear normal. There is no impacted cerumen. Left Ear: Tympanic membrane, ear canal and external ear normal. There is no impacted cerumen. Ears:      Pinto exam findings: Does not lateralize. Right Rinne: AC > BC. Left Rinne: AC > BC. Nose: No congestion or rhinorrhea. Mouth/Throat:      Lips: Pink. No lesions.       Mouth: Mucous membranes are moist. No oral lesions. Tongue: No lesions. Tongue does not deviate from midline. Palate: No mass and lesions. Pharynx: Oropharynx is clear. Uvula midline. No oropharyngeal exudate or posterior oropharyngeal erythema. Eyes:      Extraocular Movements: Extraocular movements intact. Pupils: Pupils are equal, round, and reactive to light. Musculoskeletal:      Cervical back: Normal range of motion and neck supple. Lymphadenopathy:      Cervical: No cervical adenopathy. Neurological:      Mental Status: She is alert. Cranial Nerves: No cranial nerve deficit. Balance Testing from Prior Visit:   -Laton-Hallpike testing (posterior semicircular canal testing): No nystagmus, subjective vertigo  -Supine head roll (lateral semicircular canal testing): No nystagmus, subjective vertigo-worse to the right  -Head impulse testing (VOR function): No corrective saccade  -Nystagmus testing (primary, central, left gaze): No gaze paretic nystagmus  -Romberg testing: No swaying/  -Tandem walk test: Able to complete without difficulty      Data Review      Audiogram;          Procedure     None    Winston Brooks MD  10/18/22    Portions of this note were dictated using Dragon.  There may be linguistic errors secondary to the use of this program.

## 2022-10-18 NOTE — Clinical Note
Dr. Alberto Yepez, Can you place this patient on the list for tinnitus retraining therapy when it becomes available next year? Thanks.

## 2022-10-18 NOTE — TELEPHONE ENCOUNTER
Patient was told to schedule an appointment with Dr. Karthikeyan Vargas for tinnitus retraining therapy. When she was in the office I informed her I was unsure how to schedule that appointment and I would call her back to schedule. After speaking with Dr. Maci Sharma I was informed the program is not up and running yet so I cannot schedule the patient at this time. Call paced to patient and informed her of th above information, she expressed understanding.

## 2022-11-01 ENCOUNTER — PATIENT MESSAGE (OUTPATIENT)
Dept: ENT CLINIC | Age: 52
End: 2022-11-01

## 2022-11-01 DIAGNOSIS — H93.13 TINNITUS AURIUM, BILATERAL: Primary | ICD-10-CM

## 2022-11-02 NOTE — TELEPHONE ENCOUNTER
From: Chyna Head A Fist  To: Dr. Gordo Valderrama  Sent: 11/1/2022 9:55 PM EDT  Subject: Ringing and ear aches    HI Dr. Paul Dai, I'm sorry to bother you. But, the past two days have been so bad with ear aches and ringing. Sometimes my left ear feel like it clogs up and I can't hear much out of it. I try yawning to see if it will pop. I real like I'm going nuts! Could you please let me know, if there is anything else I can do! Thank you!

## 2022-11-03 RX ORDER — PREDNISONE 20 MG/1
40 TABLET ORAL DAILY
Qty: 10 TABLET | Refills: 0 | Status: SHIPPED | OUTPATIENT
Start: 2022-11-03 | End: 2022-11-08

## 2022-11-03 NOTE — TELEPHONE ENCOUNTER
Called patient, states she is having increased anxiety recently which oxygen, and pain in the left ear that radiates down the left trapezius. Tinnitus has worsened, and she feels chest tightness even with SSRIs (initially this significantly improved her symptoms). Suspicion for TMJ secondary to jaw clenching, conservative recommendations outlined and given to the patient. We will also start her on a 5-day course of prednisone to see if this improves symptoms. She will call us back in 2 weeks or if symptoms acutely worsen.

## 2022-11-04 DIAGNOSIS — R06.02 SHORTNESS OF BREATH: ICD-10-CM

## 2022-11-07 RX ORDER — LEVALBUTEROL TARTRATE 45 UG/1
1 AEROSOL, METERED ORAL EVERY 6 HOURS PRN
Qty: 1 EACH | Refills: 1 | Status: SHIPPED | OUTPATIENT
Start: 2022-11-07 | End: 2023-11-07

## 2022-11-07 NOTE — TELEPHONE ENCOUNTER
Future appt scheduled 12/13/2022              Last appt 09/13/2022      Last Written 04/08/2022    levalbuterol (XOPENEX HFA) 45 MCG/ACT inhaler  #1 each   1 RF

## 2022-11-26 DIAGNOSIS — G89.29 CHRONIC BILATERAL LOW BACK PAIN WITH BILATERAL SCIATICA: ICD-10-CM

## 2022-11-26 DIAGNOSIS — M54.41 CHRONIC BILATERAL LOW BACK PAIN WITH BILATERAL SCIATICA: ICD-10-CM

## 2022-11-26 DIAGNOSIS — M54.42 CHRONIC BILATERAL LOW BACK PAIN WITH BILATERAL SCIATICA: ICD-10-CM

## 2022-11-28 RX ORDER — PREGABALIN 25 MG/1
25 CAPSULE ORAL 3 TIMES DAILY
Qty: 90 CAPSULE | Refills: 1 | Status: SHIPPED | OUTPATIENT
Start: 2022-11-28 | End: 2022-12-28

## 2022-12-06 ENCOUNTER — PATIENT MESSAGE (OUTPATIENT)
Dept: FAMILY MEDICINE CLINIC | Age: 52
End: 2022-12-06

## 2022-12-06 NOTE — TELEPHONE ENCOUNTER
From: Leatha Campos  To: Romero Edwards  Sent: 12/6/2022 1:48 PM EST  Subject: Headache    Hi Naveen Orlando, I'm sending a message regarding how I'm feeling today. I've had a bad headache for three days now and seeing white dots. I fell last night and feel real nauseous today and have diarrhea. I'm having ear pain in both ears with some popping with pain. My BP is 145/93 and the ringing or say buzzing is really bad!

## 2023-01-10 ENCOUNTER — OFFICE VISIT (OUTPATIENT)
Dept: ENT CLINIC | Age: 53
End: 2023-01-10
Payer: COMMERCIAL

## 2023-01-10 ENCOUNTER — OFFICE VISIT (OUTPATIENT)
Dept: FAMILY MEDICINE CLINIC | Age: 53
End: 2023-01-10
Payer: COMMERCIAL

## 2023-01-10 ENCOUNTER — HOSPITAL ENCOUNTER (OUTPATIENT)
Age: 53
Discharge: HOME | End: 2023-01-10
Payer: COMMERCIAL

## 2023-01-10 VITALS
WEIGHT: 198 LBS | BODY MASS INDEX: 45.82 KG/M2 | HEIGHT: 55 IN | DIASTOLIC BLOOD PRESSURE: 82 MMHG | SYSTOLIC BLOOD PRESSURE: 120 MMHG | OXYGEN SATURATION: 99 % | HEART RATE: 80 BPM | TEMPERATURE: 97.3 F

## 2023-01-10 VITALS
SYSTOLIC BLOOD PRESSURE: 122 MMHG | HEART RATE: 61 BPM | WEIGHT: 193 LBS | DIASTOLIC BLOOD PRESSURE: 82 MMHG | BODY MASS INDEX: 56.52 KG/M2 | OXYGEN SATURATION: 98 %

## 2023-01-10 DIAGNOSIS — G89.29 CHRONIC BILATERAL LOW BACK PAIN WITH BILATERAL SCIATICA: ICD-10-CM

## 2023-01-10 DIAGNOSIS — F41.1 GAD (GENERALIZED ANXIETY DISORDER): Primary | ICD-10-CM

## 2023-01-10 DIAGNOSIS — Z51.81 ENCOUNTER FOR MEDICATION MONITORING: ICD-10-CM

## 2023-01-10 DIAGNOSIS — F33.2 SEVERE EPISODE OF RECURRENT MAJOR DEPRESSIVE DISORDER, WITHOUT PSYCHOTIC FEATURES (HCC): ICD-10-CM

## 2023-01-10 DIAGNOSIS — M54.41 CHRONIC BILATERAL LOW BACK PAIN WITH BILATERAL SCIATICA: ICD-10-CM

## 2023-01-10 DIAGNOSIS — H81.91 PERIPHERAL VESTIBULOPATHY OF RIGHT EAR: ICD-10-CM

## 2023-01-10 DIAGNOSIS — F41.9 ANXIETY: Primary | ICD-10-CM

## 2023-01-10 DIAGNOSIS — M54.42 CHRONIC BILATERAL LOW BACK PAIN WITH BILATERAL SCIATICA: ICD-10-CM

## 2023-01-10 DIAGNOSIS — R42 DYSEQUILIBRIUM: ICD-10-CM

## 2023-01-10 DIAGNOSIS — R42 PERSISTENT POSTURAL-PERCEPTUAL DIZZINESS: ICD-10-CM

## 2023-01-10 LAB
ANION GAP SERPL CALCULATED.3IONS-SCNC: 14 MMOL/L (ref 3–16)
BUN BLDV-MCNC: 10 MG/DL (ref 7–20)
CALCIUM SERPL-MCNC: 9.3 MG/DL (ref 8.3–10.6)
CHLORIDE BLD-SCNC: 103 MMOL/L (ref 99–110)
CO2: 22 MMOL/L (ref 21–32)
CREAT SERPL-MCNC: 0.6 MG/DL (ref 0.6–1.1)
GFR SERPL CREATININE-BSD FRML MDRD: >60 ML/MIN/{1.73_M2}
GLUCOSE BLD-MCNC: 88 MG/DL (ref 70–99)
POTASSIUM SERPL-SCNC: 4.1 MMOL/L (ref 3.5–5.1)
SODIUM BLD-SCNC: 139 MMOL/L (ref 136–145)

## 2023-01-10 PROCEDURE — 3074F SYST BP LT 130 MM HG: CPT | Performed by: OTOLARYNGOLOGY

## 2023-01-10 PROCEDURE — G8417 CALC BMI ABV UP PARAM F/U: HCPCS | Performed by: OTOLARYNGOLOGY

## 2023-01-10 PROCEDURE — 3079F DIAST BP 80-89 MM HG: CPT | Performed by: OTOLARYNGOLOGY

## 2023-01-10 PROCEDURE — 80048 BASIC METABOLIC PNL TOTAL CA: CPT

## 2023-01-10 PROCEDURE — 99214 OFFICE O/P EST MOD 30 MIN: CPT | Performed by: OTOLARYNGOLOGY

## 2023-01-10 PROCEDURE — G8417 CALC BMI ABV UP PARAM F/U: HCPCS | Performed by: NURSE PRACTITIONER

## 2023-01-10 PROCEDURE — 1036F TOBACCO NON-USER: CPT | Performed by: OTOLARYNGOLOGY

## 2023-01-10 PROCEDURE — 36415 COLL VENOUS BLD VENIPUNCTURE: CPT

## 2023-01-10 PROCEDURE — G8427 DOCREV CUR MEDS BY ELIG CLIN: HCPCS | Performed by: OTOLARYNGOLOGY

## 2023-01-10 PROCEDURE — 1036F TOBACCO NON-USER: CPT | Performed by: NURSE PRACTITIONER

## 2023-01-10 PROCEDURE — 3079F DIAST BP 80-89 MM HG: CPT | Performed by: NURSE PRACTITIONER

## 2023-01-10 PROCEDURE — 3017F COLORECTAL CA SCREEN DOC REV: CPT | Performed by: NURSE PRACTITIONER

## 2023-01-10 PROCEDURE — 3017F COLORECTAL CA SCREEN DOC REV: CPT | Performed by: OTOLARYNGOLOGY

## 2023-01-10 PROCEDURE — G8484 FLU IMMUNIZE NO ADMIN: HCPCS | Performed by: NURSE PRACTITIONER

## 2023-01-10 PROCEDURE — G8427 DOCREV CUR MEDS BY ELIG CLIN: HCPCS | Performed by: NURSE PRACTITIONER

## 2023-01-10 PROCEDURE — 99214 OFFICE O/P EST MOD 30 MIN: CPT | Performed by: NURSE PRACTITIONER

## 2023-01-10 PROCEDURE — G8484 FLU IMMUNIZE NO ADMIN: HCPCS | Performed by: OTOLARYNGOLOGY

## 2023-01-10 PROCEDURE — 3074F SYST BP LT 130 MM HG: CPT | Performed by: NURSE PRACTITIONER

## 2023-01-10 RX ORDER — FLUOXETINE HYDROCHLORIDE 20 MG/1
60 CAPSULE ORAL DAILY
Qty: 270 CAPSULE | Refills: 1 | Status: SHIPPED | OUTPATIENT
Start: 2023-01-10 | End: 2023-01-10

## 2023-01-10 RX ORDER — DIAZEPAM 2 MG/1
TABLET ORAL
Qty: 30 TABLET | Refills: 2 | Status: SHIPPED | OUTPATIENT
Start: 2023-01-10 | End: 2023-02-09

## 2023-01-10 RX ORDER — AMITRIPTYLINE HYDROCHLORIDE 10 MG/1
10 TABLET, FILM COATED ORAL NIGHTLY
Qty: 90 TABLET | Refills: 1 | Status: SHIPPED | OUTPATIENT
Start: 2023-01-10

## 2023-01-10 RX ORDER — PREGABALIN 25 MG/1
25 CAPSULE ORAL 3 TIMES DAILY
Qty: 270 CAPSULE | Refills: 1 | Status: SHIPPED | OUTPATIENT
Start: 2023-01-10 | End: 2023-02-09

## 2023-01-10 RX ORDER — FLUOXETINE HYDROCHLORIDE 20 MG/1
20 CAPSULE ORAL DAILY
Qty: 30 CAPSULE | Refills: 1 | Status: SHIPPED | OUTPATIENT
Start: 2023-01-10 | End: 2023-02-09

## 2023-01-10 ASSESSMENT — PATIENT HEALTH QUESTIONNAIRE - PHQ9
5. POOR APPETITE OR OVEREATING: 3
3. TROUBLE FALLING OR STAYING ASLEEP: 3
SUM OF ALL RESPONSES TO PHQ QUESTIONS 1-9: 24
6. FEELING BAD ABOUT YOURSELF - OR THAT YOU ARE A FAILURE OR HAVE LET YOURSELF OR YOUR FAMILY DOWN: 3
8. MOVING OR SPEAKING SO SLOWLY THAT OTHER PEOPLE COULD HAVE NOTICED. OR THE OPPOSITE, BEING SO FIGETY OR RESTLESS THAT YOU HAVE BEEN MOVING AROUND A LOT MORE THAN USUAL: 3
7. TROUBLE CONCENTRATING ON THINGS, SUCH AS READING THE NEWSPAPER OR WATCHING TELEVISION: 3
SUM OF ALL RESPONSES TO PHQ9 QUESTIONS 1 & 2: 6
4. FEELING TIRED OR HAVING LITTLE ENERGY: 3
SUM OF ALL RESPONSES TO PHQ QUESTIONS 1-9: 24
2. FEELING DOWN, DEPRESSED OR HOPELESS: 3
SUM OF ALL RESPONSES TO PHQ QUESTIONS 1-9: 24
9. THOUGHTS THAT YOU WOULD BE BETTER OFF DEAD, OR OF HURTING YOURSELF: 0
SUM OF ALL RESPONSES TO PHQ QUESTIONS 1-9: 24
1. LITTLE INTEREST OR PLEASURE IN DOING THINGS: 3

## 2023-01-10 ASSESSMENT — ENCOUNTER SYMPTOMS
SHORTNESS OF BREATH: 0
WHEEZING: 0
WHEEZING: 0
ABDOMINAL PAIN: 0
SHORTNESS OF BREATH: 1
SORE THROAT: 0
RHINORRHEA: 0
VOMITING: 0
ABDOMINAL PAIN: 0
BACK PAIN: 1

## 2023-01-10 ASSESSMENT — COLUMBIA-SUICIDE SEVERITY RATING SCALE - C-SSRS
1. WITHIN THE PAST MONTH, HAVE YOU WISHED YOU WERE DEAD OR WISHED YOU COULD GO TO SLEEP AND NOT WAKE UP?: YES
2. HAVE YOU ACTUALLY HAD ANY THOUGHTS OF KILLING YOURSELF?: NO
6. HAVE YOU EVER DONE ANYTHING, STARTED TO DO ANYTHING, OR PREPARED TO DO ANYTHING TO END YOUR LIFE?: NO

## 2023-01-10 ASSESSMENT — ANXIETY QUESTIONNAIRES
3. WORRYING TOO MUCH ABOUT DIFFERENT THINGS: 3
2. NOT BEING ABLE TO STOP OR CONTROL WORRYING: 3
4. TROUBLE RELAXING: 3
GAD7 TOTAL SCORE: 21
7. FEELING AFRAID AS IF SOMETHING AWFUL MIGHT HAPPEN: 3
5. BEING SO RESTLESS THAT IT IS HARD TO SIT STILL: 3
6. BECOMING EASILY ANNOYED OR IRRITABLE: 3
1. FEELING NERVOUS, ANXIOUS, OR ON EDGE: 3

## 2023-01-10 NOTE — PROGRESS NOTES
Tosin Dahl 74, [address] [facility], [address] [place] [address]  P: [phone]       Patient     Tanner Graves  1970    Chief Concern     Chief Complaint   Patient presents with    Follow-up     Patient is here today for her 3 month follow up of dizziness. Patient states she has fell 5 times since last OV. She states she has had some improvement but not much. She states when she looks down she sees white dots, looking up it feels like she is floating a little bit. Assessment and Plan      Diagnosis Orders   1. Anxiety  FLUoxetine (PROZAC) 20 MG capsule      2. Peripheral vestibulopathy of right ear  [facility] - [facility]      3. Dysequilibrium  Basic Metabolic Panel        [id number] female with 2 years of persistent dizziness, worse with positioning and when upright, with a sensation of floating, with devi spinning vertigo with postural changes. Examination including Mariaa-Hallpike testing and head thrust testing without devi nystagmus however significant disequilibrium/anxiety and nausea. VNG with suspected uncompensated right vesibulopathy, MRI without CPA lesions. Has had improvement in vertigo since starting SSRIs, vertigo with decreased intensity and does not always occur with positional changes. Continues to have a \"floating\" feeling especially when in a moving car. Will increase SSRI dosing, refer for balance physical therapy and have ordered BMP-we will consider Dyazide for possible Ménière's disease. Return in about 3 months (around 4/10/2023). History of Present Illness     Patient with concern for dizziness. She states that 2 years ago when she bent over and looked up she had spinning vertigo along with \"seeing dots\" that lasted approximately 3-4 minutes, with subsequent dysequilibrium and nausea and the sensation of \"floating\". Feels more stable lying down although the transition to lying from sitting causes dysequilibrium.  When she goes around a curve she feels she is still floating. Occasionally feels anxious due to this condition, and has had falls before due to this. Has history of depression and is on amitriptyline 05/2022 - states that this has helped slightly but when she is angry/anxious she gets significant dysequilibrium. Denies caffeine use, drinks Gatorade and lemonade only. Interval History 10/18/2022: Continuing prozac 20mg BID, believes that the \"floating feeling\" has not improved, however the feeling of spinning when she lies down has significantly improved - no longer all the the time, has occurred 1-2x in the past week with decreased intensity. Believes that the tinnitus in her ears initially improved on prozac, but then on 9/12 she had worse tinnitus which is always in the right ear but occasionally bilateral (especially when \"I get worked up\"), with occasional ear fullness. Has been on lyrica for chronic pain which significantly improves symptoms. Interval History 1/10/2022: On prozac 20mg BID, continues to have bilateral tinnitus. Believes that dizziness is improved 40-50% - worsens with anxiety, quick movements in dark environments. MRI 08/2022 without CPA lesions, VNG 08/2022 with right caloric weakness. Vertigo with one episode last week with ear throbbing, nausea that lasted 5-6 minutes. Left ear with significant popping, facial swelling, otalgia. Significant improvement with prednisone - tinnitus improves with prednisone, but balance does not improve. Since 11/2022 has had 6 migraines, with significant nauseous and dizziness.      Caffeine 1 soda daily, water 72oz/day    Past Medical History     Past Medical History:   Diagnosis Date    Anxiety     Chronic back pain     Cyst (solitary) of breast     RIGHT BREAST    Depression     Hypertension        Past Surgical History     Past Surgical History:   Procedure Laterality Date    BACK SURGERY  11/30/2016    L3/L4, L4/5 due to injury at work, hx STNA, s/p lumbar cage    CARPAL TUNNEL RELEASE Right     2012    CHOLECYSTECTOMY  2005    EYE SURGERY  1983    right eye tumors    HYSTERECTOMY (CERVIX STATUS UNKNOWN)  2000    TUBAL LIGATION         Family History     Family History   Problem Relation Age of Onset    Heart Disease Mother     High Blood Pressure Mother     Heart Disease Father     High Blood Pressure Father     Heart Disease Paternal Grandfather        Social History     Social History     Tobacco Use    Smoking status: Former     Types: Cigarettes     Quit date: 3/6/2009     Years since quittin.8    Smokeless tobacco: Never   Substance Use Topics    Alcohol use: No     Comment: 1 beer a month    Drug use: No        Allergies     Allergies   Allergen Reactions    Cymbalta [Duloxetine Hcl] Nausea Only    Albuterol Palpitations       Medications     Current Outpatient Medications   Medication Sig Dispense Refill    FLUoxetine (PROZAC) 20 MG capsule Take 3 capsules by mouth daily 270 capsule 1    levalbuterol (XOPENEX HFA) 45 MCG/ACT inhaler Inhale 1 puff into the lungs every 6 hours as needed for Wheezing or Shortness of Breath 1 each 1    pregabalin (LYRICA) 25 MG capsule Take 1 capsule by mouth 3 times daily for 30 days. 270 capsule 1    diazePAM (VALIUM) 2 MG tablet Take 1/2 tablet BID PRN anxiety or dizziness, Max dose 2 mg daily 30 tablet 2     No current facility-administered medications for this visit. Review of Systems     Review of Systems   Constitutional:  Negative for activity change, chills, diaphoresis, fatigue and fever. HENT:  Negative for congestion, hearing loss and tinnitus. Eyes:  Negative for visual disturbance. Respiratory:  Negative for shortness of breath and wheezing. Cardiovascular:  Negative for chest pain. Gastrointestinal:  Negative for abdominal pain. Musculoskeletal:  Negative for neck pain and neck stiffness. Skin:  Negative for rash. Neurological:  Positive for dizziness and light-headedness. Negative for tremors, syncope, speech difficulty, weakness and headaches. Hematological:  Negative for adenopathy. Psychiatric/Behavioral:  Positive for agitation. Negative for confusion. PhysicalExam     Vitals:    01/10/23 1044   BP: 120/82   Pulse: 80   Temp: 97.3 °F (36.3 °C)   TempSrc: Infrared   SpO2: 99%   Weight: 198 lb (89.8 kg)   Height: 4' 1\" (1.245 m)       Physical Exam  Vitals reviewed. Constitutional:       Appearance: Normal appearance. HENT:      Head: Normocephalic and atraumatic. Right Ear: Tympanic membrane, ear canal and external ear normal. There is no impacted cerumen. Left Ear: Tympanic membrane, ear canal and external ear normal. There is no impacted cerumen. Ears:      Pinto exam findings: Does not lateralize. Right Rinne: AC > BC. Left Rinne: AC > BC. Nose: No congestion or rhinorrhea. Mouth/Throat:      Lips: Pink. No lesions. Mouth: Mucous membranes are moist. No oral lesions. Tongue: No lesions. Tongue does not deviate from midline. Palate: No mass and lesions. Pharynx: Oropharynx is clear. Uvula midline. No oropharyngeal exudate or posterior oropharyngeal erythema. Eyes:      Extraocular Movements: Extraocular movements intact. Pupils: Pupils are equal, round, and reactive to light. Musculoskeletal:      Cervical back: Normal range of motion and neck supple. Lymphadenopathy:      Cervical: No cervical adenopathy. Neurological:      Mental Status: She is alert. Cranial Nerves: No cranial nerve deficit. Balance Testing from Prior Visit:   -Mariaa-Hallpike testing (posterior semicircular canal testing): No nystagmus, subjective vertigo  -Supine head roll (lateral semicircular canal testing):  No nystagmus, subjective vertigo-worse to the right  -Head impulse testing (VOR function): No corrective saccade  -Nystagmus testing (primary, central, left gaze): No gaze paretic nystagmus  -Romberg testing: No swaying/  -Tandem walk test: Able to complete without difficulty      Data Review      Audiogram;          Procedure     None    Tuan Moraes MD  1/10/23    Portions of this note were dictated using Dragon.  There may be linguistic errors secondary to the use of this program.

## 2023-01-10 NOTE — PATIENT INSTRUCTIONS
-Increase prozac to 60mg daily  -Obtain blood draw to check electrolyte levels - if within normal limits, will start salt pill (dyazide)  -Try to follow up with physical therapy for balance physical therapy

## 2023-01-10 NOTE — PROGRESS NOTES
1/10/2023    Chief Complaint   Patient presents with    Hypertension    Anxiety    Depression    Back Pain    Dizziness     Seeing Dr Niya Young is a 47 y.o. female, presents today for:      ASSESSMENT/PLAN:    1. JOSETTE (generalized anxiety disorder)  Worsening over the past several months, likely lifestyle related. However, medications do not seem to be improving. Reviewed when patient seemed to be feeling the best which seemed to be when she was on Amitriptyline around 4/2022. Per pt she has been on multiple medications in the past: Amitriptyline (4/2022- helped), Cymbalta (2022, nauseated, dizzy, tired), Buspirone (unknown SE), Prozac (unknown SE)  Medication Changes today: Decrease Prozac to 20 mg, restart Amitriptyline at 10 mg, continue Valium at 2 mg BID PRN with goal to wean (but may be unable due to vertigo)  Genesight testing completed 4/2019- reviewed today- showing may not tolerate Prozac at higher dosing. Called ENT Dr Emy García regarding finding and worsening finding. ENT ok with lower Prozac dosing and restarting Amitriptyline to assist with mental health for now. Will refer to TelePsychology to assist with coping skills given worsening anxiety/ depression/ panic attacks  Encouraged self care  Encouraged 30-45 minutes daily physical exercise as tolearted  Encouraged portion control, mixture of protein, carbohydrates, fruits/ veggies. Encouraged Sleep Hygiene    - diazePAM (VALIUM) 2 MG tablet; Take 1/2 tablet BID PRN anxiety or dizziness, Max dose 2 mg daily  Dispense: 30 tablet; Refill: 2  - amitriptyline (ELAVIL) 10 MG tablet; Take 1 tablet by mouth nightly  Dispense: 90 tablet; Refill: 1  - FLUoxetine (PROZAC) 20 MG capsule; Take 1 capsule by mouth daily  Dispense: 30 capsule; Refill: 1    2. Severe episode of recurrent major depressive disorder, without [facility]  See above  - amitriptyline (ELAVIL) 10 MG tablet;  Take 1 tablet by mouth nightly  Dispense: 90 tablet; Refill: 1  - FLUoxetine (PROZAC) 20 MG capsule; Take 1 capsule by mouth daily  Dispense: 30 capsule; Refill: 1    3. Chronic bilateral low back pain with bilateral sciatica  Stable today  Continue Lyrica  [place] checked today, Medication Contract obtained today, UDS obtained today  Encouraged HEP  MRI completed 5/2022  Consider specialist referral pending vertigo resolution  - pregabalin (LYRICA) 25 MG capsule; Take 1 capsule by mouth 3 times daily for 30 days. Dispense: 270 capsule; Refill: 1  - amitriptyline (ELAVIL) 10 MG tablet; Take 1 tablet by mouth nightly  Dispense: 90 tablet; Refill: 1    4. Persistent postural-perceptual dizziness  See ENT notes from today and anxiety above  Currently pending Balance therapy evaluation  - FLUoxetine (PROZAC) 20 MG capsule; Take 1 capsule by mouth daily  Dispense: 30 capsule; Refill: 1    5. Encounter for medication monitoring  UDS obtained today. - Drug Panel-PM-HI Res-UR Interp-A    Return in about 2 months (around 3/10/2023). Presenting today to follow up on anxiety/ depression and dizziness. Dizziness: Continues to having cicada/ popping noises in bilateral ears, worse in right ears. Following with ENT Toro Drew). Increased Prozac today to 60 mg. Planning Balance therapy when it becomes available. Her MRI was negative. Anxiety/ Depression: Much worse from prior. Continues to be extremely stressed with her family situation. Has custody lozano with her grandkids. Grandkids continues to be sick and needing to go to [address] [place] [address] for care. Fighting with her kids frequently-  accuses her of \"babying\" them. She is fighting more with her  in general.  Also feeling more fatigued and having less energy. Unable to fall alsleep at night- sleeping 3-5 hours at night. Feeling stressed all the time which worsens her overall dizziness. She can't do things like she used to do.   Physical symptoms of her anxiety include chest tightness which sometimes feels like a finger flickering over her chest or or knife jabbing in her back. She will also having almost daily panic attacks where she will have SOB, start to scratch/ dig in her face. She overall felt better when she was previously on Amitriptyline. She has been told by ENT that she could take Amitripytline when on Prozac. Controlled Substances Monitoring  Last [place] check: 1/10/22  Signs of possible drug abuse or diversion? No  Date controlled substance contract signed: 1/10/22  Last urine drug screen: 1/10/22      PHQ-9  1/10/2023   Little interest or pleasure in doing things 3   Feeling down, depressed, or hopeless 3   Trouble falling or staying asleep, or sleeping too much 3   Feeling tired or having little energy 3   Poor appetite or overeating 3   Feeling bad about yourself - or that you are a failure or have let yourself or your family down 3   Trouble concentrating on things, such as reading the newspaper or watching television 3   Moving or speaking so slowly that other people could have noticed. Or the opposite - being so fidgety or restless that you have been moving around a lot more than usual 3   Thoughts that you would be better off dead, or of hurting yourself in some way 0   PHQ-2 Score 6   PHQ-9 Total Score 24   If you checked off any problems, how difficult have these problems made it for you to do your work, take care of things at home, or get along with other people? -         [facility] Scores 1/10/2023 9/13/2022 8/19/2022 7/22/2022 3/25/2022 4/15/2019 4/26/2018   PHQ2 Score 6 6 5 1 4 2 6   PHQ9 Score 24 23 18 4 16 2 24     Interpretation of Total Score Depression Severity: 1-4 = Minimal depression, 5-9 = Mild depression, 10-14 = Moderate depression, 15-19 = Moderately severe depression, 20-27 = Severe depression    JOSETTE 7 SCORE 1/10/2023 9/13/2022   JOSETTE-7 Total Score 21 21     Interpretation of JOSETTE-7 score: 5-9 = mild anxiety, 10-14 = moderate anxiety, 15+ = severe anxiety. Recommend referral to behavioral health for scores 10 or greater. Lab Results   Component Value Date     01/10/2023    K 4.1 01/10/2023     01/10/2023    CO2 22 01/10/2023    BUN 10 01/10/2023    CREATININE 0.6 01/10/2023    GLUCOSE 88 01/10/2023    CALCIUM 9.3 01/10/2023    PROT 7.9 03/25/2022    LABALBU 4.8 03/25/2022    BILITOT <0.2 03/25/2022    ALKPHOS 124 03/25/2022    AST 20 03/25/2022    ALT 18 03/25/2022    LABGLOM >60 01/10/2023    GFRAA >60 08/30/2022    AGRATIO 1.5 03/25/2022    GLOB 4.0 10/28/2019         Review of Systems   Constitutional:  Negative for fever. HENT:  Negative for ear pain, rhinorrhea and sore throat. Respiratory:  Positive for shortness of breath. Negative for wheezing. Cardiovascular:  Negative for chest pain and leg swelling. Gastrointestinal:  Negative for abdominal pain and vomiting. Musculoskeletal:  Positive for back pain and neck pain (right sided down to shoulder). Skin:  Negative for rash. Neurological:  Positive for headaches (bilateral temporal). Psychiatric/Behavioral:  Positive for decreased concentration, dysphoric mood and sleep disturbance. Negative for suicidal ideas. The patient is nervous/anxious. Current Outpatient Medications on File Prior to Visit   Medication Sig Dispense Refill    levalbuterol (XOPENEX HFA) 45 MCG/ACT inhaler Inhale 1 puff into the lungs every 6 hours as needed for Wheezing or Shortness of Breath 1 each 1     No current facility-administered medications on file prior to visit.      Allergies   Allergen Reactions    Cymbalta [Duloxetine Hcl] Nausea Only    Albuterol Palpitations     Past Medical History:   Diagnosis Date    Anxiety     Chronic back pain     Cyst (solitary) of breast     RIGHT BREAST    Depression     Hypertension      Past Surgical History:   Procedure Laterality Date    BACK SURGERY  11/30/2016    L3/L4, L4/5 due to injury at work, hx STNA, s/p lumbar cage    CARPAL TUNNEL RELEASE Right 2012    CHOLECYSTECTOMY  2005    EYE SURGERY  1983    right eye tumors    HYSTERECTOMY (CERVIX STATUS UNKNOWN)  2000    TUBAL LIGATION       Social History     Tobacco Use    Smoking status: Former     Types: Cigarettes     Quit date: 3/6/2009     Years since quittin.8    Smokeless tobacco: Never   Substance Use Topics    Alcohol use: No     Comment: 1 beer a month     Family History   Problem Relation Age of Onset    Heart Disease Mother     High Blood Pressure Mother     Heart Disease Father     High Blood Pressure Father     Heart Disease Paternal Grandfather        Vitals:    01/10/23 1448   BP: 122/82   Pulse: 61   SpO2: 98%   Weight: 193 lb (87.5 kg)     Estimated body mass index is 56.52 kg/m² as calculated from the following:    Height as of an earlier encounter on 1/10/23: 4' 1\" (1.245 m). Weight as of this encounter: 193 lb (87.5 kg). Physical Exam  Vitals reviewed. Constitutional:       Appearance: Normal appearance. HENT:      Head: Normocephalic. Neck:      Vascular: No carotid bruit. Cardiovascular:      Rate and Rhythm: Normal rate and regular rhythm. Pulses: Normal pulses. Carotid pulses are 2+ on the right side and 2+ on the left side. Dorsalis pedis pulses are 2+ on the right side and 2+ on the left side. Posterior tibial pulses are 2+ on the right side and 2+ on the left side. Heart sounds: Normal heart sounds. No murmur heard. No gallop. Pulmonary:      Effort: Pulmonary effort is normal.      Breath sounds: Normal breath sounds. No wheezing or rhonchi. Abdominal:      General: Abdomen is flat. Palpations: Abdomen is soft. Tenderness: There is no abdominal tenderness. Hernia: No hernia is present. Musculoskeletal:         General: Normal range of motion. Cervical back: Normal range of motion. Right lower leg: No edema. Left lower leg: No edema. Lymphadenopathy:      Cervical: No cervical adenopathy. Skin:     General: Skin is warm and dry. Capillary Refill: Capillary refill takes less than 2 seconds. Neurological:      General: No focal deficit present. Mental Status: She is alert and oriented to person, place, and time. Sensory: Sensation is intact. Motor: Motor function is intact. Coordination: Coordination is intact. Gait: Gait is intact. Deep Tendon Reflexes:      Reflex Scores:       Bicep reflexes are 2+ on the right side and 2+ on the left side. Patellar reflexes are 2+ on the right side and 2+ on the left side. Comments: Bilateral hand action fine motor tremor noted     Psychiatric:         Mood and Affect: Mood normal.         Behavior: Behavior normal.         Patient's questions answered and concerns addressed. Patient agrees to plan of care.         Electronically signed by KARINA Irizarry CNP on 1/10/2023 at 7:36 PM

## 2023-01-10 NOTE — LETTER
CONTROLLED SUBSTANCE MEDICATION AGREEMENT     Patient Name: Zeinab Lozano  Patient YOB: 1970 lyrica and valium   I understand, that controlled substance medications may be used to help better manage my symptoms and to improve my ability to function at home, work and in social settings. However, I also understand that these medications do have risks, which have been discussed with me, including possible development of physical or psychological dependence. I understand that successful treatment requires mutual trust and honesty between me and my provider. I understand and agree that following this Medication Agreement is necessary in continuing my provider-patient relationship and the success of my treatment plan. Explanation from my Provider: Benefits and Goals of Controlled Substance Medications: There are two potential goals for your treatment: (1) decreased pain and suffering (2) improved daily life functions. There are many possible treatments for your chronic condition(s). Alternatives such as physical therapy, yoga, massage, home daily exercise, meditation, relaxation techniques, injections, chiropractic manipulations, surgery, cognitive therapy, hypnosis and many medications that are not habit-forming may be used. Use of controlled substance medications may be helpful, but they are unlikely to resolve all symptoms or restore all function. Explanation from my Provider: Risks of Controlled Substance Medications:  Opioid pain medications: These medications can lead to problems such as addiction/dependence, sedation, lightheadedness/dizziness, memory issues, falls, constipation, nausea, or vomiting. They may also impair the ability to drive or operate machinery. Additionally, these medications may lower testosterone levels, leading to loss of bone strength, stamina and sex drive.   They may cause problems with breathing, sleep apnea and reduced coughing, which is especially dangerous for patients with lung disease. Overdose or dangerous interactions with alcohol and other medications may occur, leading to death. Hyperalgesia may develop, which means patients receiving opioids for the treatment of pain may become more sensitive to certain painful stimuli, and in some cases, experience pain from ordinarily non-painful stimuli. Women between the ages of 14-53 who could become pregnant should carefully weigh the risks and benefits of opioids with their physicians, as these medications increase the risk of pregnancy complications, including miscarriage,  delivery and stillbirth. It is also possible for babies to be born addicted to opioids. Opioid dependence withdrawal symptoms may include; feelings of uneasiness, increased pain, irritability, belly pain, diarrhea, sweats and goose-flesh. Benzodiazepines and non-benzodiazepine sleep medications: These medications can lead to problems such as addiction/dependence, sedation, fatigue, lightheadedness, dizziness, incoordination, falls, depression, hallucinations, and impaired judgment, memory and concentration. The ability to drive and operate machinery may also be affected. Abnormal sleep-related behaviors have been reported, including sleepwalking, driving, making telephone calls, eating, or having sex while not fully awake. These medications can suppress breathing and worsen sleep apnea, particularly when combined with alcohol or other sedating medications, potentially leading to death. Dependence withdrawal symptoms may include tremors, anxiety, hallucinations and seizures. Stimulants:  Common adverse effects include addiction/dependence, increased blood  pressure and heart rate, decreased appetite, nausea, involuntary weight loss, insomnia,                                                                                                                     Initials:_______   irritability, and headaches.   These risks may increase when these medications are combined with other stimulants, such as caffeine pills or energy drinks, certain weight loss supplements and oral decongestants. Dependence withdrawal symptoms may include depressed mood, loss of interest, suicidal thoughts, anxiety, fatigue, appetite changes and agitation. Testosterone replacement therapy:  Potential side effects include increased risk of stroke and heart attack, blood clots, increased blood pressure, increased cholesterol, enlarged prostate, sleep apnea, irritability/aggression and other mood disorders, and decreased fertility. I agree and understand that I and my prescriber have the following rights and responsibilities regarding my treatment plan:     1. MY RIGHTS:  To be informed of my treatment and medication plan. To be an active participant in my health and wellbeing. 2. MY RESPONSIBILITY AND UNDERSTANDING FOR USE OF MEDICATIONS   I will take medications at the dose and frequency as directed. For my safety, I will not increase or change how I take my medications without the recommendation of my healthcare provider.  I will actively participate in any program recommended by my provider which may improve function, including social, physical, psychological programs.  I will not take my medications with alcohol or other drugs not prescribed to me. I understand that drinking alcohol with my medications increases the chances of side effects, including reduced breathing rate and could lead to personal injury when operating machinery.  I understand that if I have a history of substance use disorders, including alcohol or other illicit drugs, that I may be at increased risk of addiction to my medications.  I agree to notify my provider immediately if I should become pregnant so that my treatment plan can be adjusted.    I agree and understand that I shall only receive controlled substance medications from the prescriber that signed this agreement unless there is written agreement among other prescribers of controlled substances outlining the responsibility of the medications being prescribed.  I understand that the if the controlled medication is not helping to achieve goals, the dosage may be tapered and no longer prescribed. 3. MY RESPONSIBILITY FOR COMMUNICATION / PRESCRIPTION RENEWALS   I agree that all controlled substance medications that I take will be prescribed only by my provider. If another healthcare provider prescribes me medication in an emergency, I will notify my provider within seventy-two (72) hours.  I will arrange for refills at the prescribed interval ONLY during regular office hours. I will not ask for refills earlier than agreed, after-hours, on holidays or weekends. Refills may take up to 72 hours for processing and prescriptions to reach the pharmacy.  I will inform my other health care providers that I am taking these medications and of the existence of this [id number]. In the event of an emergency, I will provide the same information to the emergency department prescribers.  I will keep my provider updated on the pharmacy I am using for controlled medication prescription filling. Initials:_______  4. MY RESPONSIBILITY FOR PROTECTING MEDICATIONS   I will protect my prescriptions and medications. I understand that lost or misplaced prescriptions will not be replaced.  I will keep medications only for my own use and will not share them with others. I will keep all medications away from children.  I agree that if my medications are adjusted or discontinued, I will properly dispose of any remaining medications. I understand that I will be required to dispose of any remaining controlled medications as, directed by my prescriber, prior to being provided with any prescriptions for other controlled medications.   Medication drop box locations can be found at: [employer]    5. MY RESPONSIBILITY WITH ILLEGAL DRUGS    I will not use illegal or street drugs or another person's prescription medications not prescribed to me.  If there are identified addiction type symptoms, then referral to a program may be provided by my provider and I agree to follow through with this recommendation. 6. MY RESPONSIBILITY FOR COOPERATION WITH INVESTIGATIONS   I understand that my provider will comply with any applicable law and may discuss my use and/or possible misuse/abuse of controlled substances and alcohol, as appropriate, with any health care provider involved in my care, pharmacist, or legal authority.  I authorize my provider and pharmacy to cooperate fully with law enforcement agencies (as permitted by law) in the investigation of any possible misuse, sale, or other diversion of my controlled substances.  I agree to waive any applicable privilege or right of privacy or confidentiality with respect to these authorizations. 7. PROVIDERS RIGHT TO MONITOR FOR SAFETY: PRESCRIPTION MONITORING / DRUG TESTING   I consent to drug/toxicology screening and will submit to a drug screen upon my providers request to assure I am only taking the prescribed drugs for my safety monitoring. I understand that a drug screen is a laboratory test in which a sample of my urine, blood or saliva is checked to see what drugs I have been taking. This may entail an observed urine specimen, which means that a nurse or other health care provider may watch me provide urine, and I will cooperate if I am asked to provide an observed specimen.  I understand that my provider will check a copy of my State Prescription Monitoring Program () Report in order to safely prescribe medications.  Pill Counts: I consent to pill counts when requested.   I may be asked to bring all my prescribed controlled substance medications, in their original bottles, to all of my scheduled appointments. In addition, my provider may ask me to come to the practice at any time for a random pill count. 8. TERMINATION OF THIS AGREEMENT  For my safety, my prescriber has the right to stop prescribing controlled substance medications and may end this agreement. Initials:_______   Conditions that may result in termination of this agreement:  a. I do not show any improvement in pain, or my activity has not improved. b. I develop rapid tolerance or loss of improvement, as described in my treatment plan.  c. I develop significant side effects from the medication. d. My behavior is not consistent with the responsibilities outlined above, thereby causing safety concerns to continue prescribing controlled substance medications. e. I fail to follow the terms of this agreement. f. Other:____________________________       UNDERSTANDING THIS MEDICATION AGREEMENT:    I have read the above and have had all my questions answered. For chronic disease management, I know that my symptoms can be managed with many types of treatments. A chronic medication trial may be part of my treatment, but I must be an active participant in my care. Medication therapy is only one part of my symptom management plan. In some cases, there may be limited scientific evidence to support the chronic use of certain medications to improve symptoms and daily function. Furthermore, in certain circumstances, there may be scientific information that suggests that the use of chronic controlled substances may worsen my symptoms and increase my risk of unintentional death directly related to this medication therapy. I know that if my provider feels my risk from controlled medications is greater than my benefit, I will have my controlled substance medication(s) compassionately lowered or removed altogether.      I further agree to allow this office to contact my HIPAA contact if there are concerns about my safety and use of the controlled medications. I have agreed to use the prescribed controlled substance medications to me as instructed by my provider and as stated in this Medication Agreement. My initial on each page and my signature below shows that I have read each page and I have had the opportunity to ask questions with answers provided by my provider.     Patient Name (Printed): _____________________________________  Patient Signature:  ______________________   Date: _____________    Prescriber Name (Printed): ___________________________________  Prescriber Signature: _____________________  Date: _____________

## 2023-01-14 LAB
6-ACETYLMORPHINE: NOT DETECTED
7-AMINOCLONAZEPAM: NOT DETECTED
ALPHA-OH-ALPRAZOLAM: NOT DETECTED
ALPHA-OH-MIDAZOLAM, URINE: NOT DETECTED
ALPRAZOLAM: NOT DETECTED
AMPHETAMINE: NOT DETECTED
BARBITURATES: NOT DETECTED
BENZOYLECGONINE: NOT DETECTED
BUPRENORPHINE: NOT DETECTED
CARISOPRODOL: NOT DETECTED
CLONAZEPAM: NOT DETECTED
CODEINE: NOT DETECTED
CREATININE URINE: 227.4 MG/DL (ref 20–400)
DIAZEPAM: NOT DETECTED
DRUGS EXPECTED: NORMAL
EER PAIN MGT DRUG PANEL, HIGH RES/EMIT U: NORMAL
ETHYL GLUCURONIDE: NOT DETECTED
FENTANYL: NOT DETECTED
GABAPENTIN: NOT DETECTED
HYDROCODONE: NOT DETECTED
HYDROMORPHONE: NOT DETECTED
LORAZEPAM: NOT DETECTED
MARIJUANA METABOLITE: PRESENT
MDA: NOT DETECTED
MDEA: NOT DETECTED
MDMA URINE: NOT DETECTED
MEPERIDINE: NOT DETECTED
METHADONE: NOT DETECTED
METHAMPHETAMINE: NOT DETECTED
METHYLPHENIDATE: NOT DETECTED
MIDAZOLAM: NOT DETECTED
MORPHINE: NOT DETECTED
NALOXONE: NOT DETECTED
NORBUPRENORPHINE, FREE: NOT DETECTED
NORDIAZEPAM: PRESENT
NORFENTANYL: NOT DETECTED
NORHYDROCODONE, URINE: NOT DETECTED
NOROXYCODONE: NOT DETECTED
NOROXYMORPHONE, URINE: NOT DETECTED
OXAZEPAM: PRESENT
OXYCODONE: NOT DETECTED
OXYMORPHONE: NOT DETECTED
PAIN MANAGEMENT DRUG PANEL: NORMAL
PAIN MANAGEMENT DRUG PANEL: NORMAL
PCP: NOT DETECTED
PHENTERMINE: NOT DETECTED
PREGABALIN: PRESENT
TAPENTADOL, URINE: NOT DETECTED
TAPENTADOL-O-SULFATE, URINE: NOT DETECTED
TEMAZEPAM: PRESENT
TRAMADOL: NOT DETECTED
ZOLPIDEM: NOT DETECTED

## 2023-01-15 NOTE — RESULT ENCOUNTER NOTE
UDS consistent with Valium/ Lyrica. Not consistent with Marijuana. Will discuss with patient at next visit.

## 2023-01-19 PROBLEM — F41.0 GENERALIZED ANXIETY DISORDER WITH PANIC ATTACKS: Status: ACTIVE | Noted: 2022-09-13

## 2023-03-20 DIAGNOSIS — M54.42 CHRONIC BILATERAL LOW BACK PAIN WITH BILATERAL SCIATICA: ICD-10-CM

## 2023-03-20 DIAGNOSIS — F41.1 GAD (GENERALIZED ANXIETY DISORDER): ICD-10-CM

## 2023-03-20 DIAGNOSIS — F33.2 SEVERE EPISODE OF RECURRENT MAJOR DEPRESSIVE DISORDER, WITHOUT PSYCHOTIC FEATURES (HCC): ICD-10-CM

## 2023-03-20 DIAGNOSIS — M54.41 CHRONIC BILATERAL LOW BACK PAIN WITH BILATERAL SCIATICA: ICD-10-CM

## 2023-03-20 DIAGNOSIS — G89.29 CHRONIC BILATERAL LOW BACK PAIN WITH BILATERAL SCIATICA: ICD-10-CM

## 2023-03-20 RX ORDER — AMITRIPTYLINE HYDROCHLORIDE 10 MG/1
10 TABLET, FILM COATED ORAL NIGHTLY
Qty: 90 TABLET | Refills: 1 | OUTPATIENT
Start: 2023-03-20

## 2023-03-21 ENCOUNTER — OFFICE VISIT (OUTPATIENT)
Dept: ENT CLINIC | Age: 53
End: 2023-03-21
Payer: COMMERCIAL

## 2023-03-21 VITALS — WEIGHT: 193 LBS | BODY MASS INDEX: 38.91 KG/M2 | HEIGHT: 59 IN | TEMPERATURE: 97 F

## 2023-03-21 DIAGNOSIS — M26.622 ARTHRALGIA OF LEFT TEMPOROMANDIBULAR JOINT: ICD-10-CM

## 2023-03-21 DIAGNOSIS — R42 DIZZINESS: Primary | ICD-10-CM

## 2023-03-21 PROCEDURE — G8417 CALC BMI ABV UP PARAM F/U: HCPCS | Performed by: OTOLARYNGOLOGY

## 2023-03-21 PROCEDURE — 99213 OFFICE O/P EST LOW 20 MIN: CPT | Performed by: OTOLARYNGOLOGY

## 2023-03-21 PROCEDURE — G8427 DOCREV CUR MEDS BY ELIG CLIN: HCPCS | Performed by: OTOLARYNGOLOGY

## 2023-03-21 PROCEDURE — 3017F COLORECTAL CA SCREEN DOC REV: CPT | Performed by: OTOLARYNGOLOGY

## 2023-03-21 PROCEDURE — G8484 FLU IMMUNIZE NO ADMIN: HCPCS | Performed by: OTOLARYNGOLOGY

## 2023-03-21 PROCEDURE — 1036F TOBACCO NON-USER: CPT | Performed by: OTOLARYNGOLOGY

## 2023-03-21 ASSESSMENT — ENCOUNTER SYMPTOMS
ABDOMINAL PAIN: 0
WHEEZING: 0
SHORTNESS OF BREATH: 0

## 2023-03-21 NOTE — PROGRESS NOTES
Hypertension        Past Surgical History     Past Surgical History:   Procedure Laterality Date    BACK SURGERY  2016    L3/L4, L4/5 due to injury at work, hx STNA, s/p lumbar cage    CARPAL TUNNEL RELEASE Right     2012    CHOLECYSTECTOMY      [address]    right eye tumors    HYSTERECTOMY (CERVIX STATUS UNKNOWN)  2000    TUBAL LIGATION         Family History     Family History   Problem Relation Age of Onset    Heart Disease Mother     High Blood Pressure Mother     Heart Disease Father     High Blood Pressure Father     Heart Disease Paternal Grandfather        Social History     Social History     Tobacco Use    Smoking status: Former     Types: Cigarettes     Quit date: 3/6/2009     Years since quittin.0    Smokeless tobacco: Never   Vaping Use    Vaping Use: Never used   Substance Use Topics    Alcohol use: No     Comment: 1 beer a month    Drug use: No        Allergies     Allergies   Allergen Reactions    Cymbalta [Duloxetine Hcl] Nausea Only    Albuterol Palpitations       Medications     Current Outpatient Medications   Medication Sig Dispense Refill    amitriptyline (ELAVIL) 10 MG tablet Take 1 tablet by mouth nightly 90 tablet 1    levalbuterol (XOPENEX HFA) 45 MCG/ACT inhaler Inhale 1 puff into the lungs every 6 hours as needed for Wheezing or Shortness of Breath 1 each 1    pregabalin (LYRICA) 25 MG capsule Take 1 capsule by mouth 3 times daily for 30 days. 270 capsule 1    FLUoxetine (PROZAC) 20 MG capsule Take 1 capsule by mouth daily 30 capsule 1     No current facility-administered medications for this visit. Review of Systems     Review of Systems   Constitutional:  Negative for activity change, chills, diaphoresis, fatigue and fever. HENT:  Negative for congestion, hearing loss and tinnitus. Eyes:  Negative for visual disturbance. Respiratory:  Negative for shortness of breath and wheezing. Cardiovascular:  Negative for chest pain.    Gastrointestinal:

## 2023-03-21 NOTE — PATIENT INSTRUCTIONS
Temporomandibular Joint Arthralgia (suspected) guidelines:  -Ibuprofen 600mg every 8 hours x 7 days (may exacerbate LPR/gastroesophageal reflux) - can use when pain flares up  -Soft non chewing diet x 7-14 days  -Warm compresses to the left temporomandibular joint every 4 hours x 7 days  -Consider use of mouthguard to prevent nocturnal bruxism (teeth grinding at night) or jaw clenching during the day)

## 2023-03-27 ENCOUNTER — PROCEDURE VISIT (OUTPATIENT)
Dept: AUDIOLOGY | Age: 53
End: 2023-03-27
Payer: COMMERCIAL

## 2023-03-27 ENCOUNTER — TELEMEDICINE (OUTPATIENT)
Dept: FAMILY MEDICINE CLINIC | Age: 53
End: 2023-03-27
Payer: COMMERCIAL

## 2023-03-27 DIAGNOSIS — H90.3 SENSORINEURAL HEARING LOSS, BILATERAL: Primary | ICD-10-CM

## 2023-03-27 DIAGNOSIS — H93.13 TINNITUS, BILATERAL: ICD-10-CM

## 2023-03-27 DIAGNOSIS — H10.022 PINK EYE DISEASE OF LEFT EYE: Primary | ICD-10-CM

## 2023-03-27 PROCEDURE — 99441 PR PHYS/QHP TELEPHONE EVALUATION 5-10 MIN: CPT | Performed by: NURSE PRACTITIONER

## 2023-03-27 PROCEDURE — 92557 COMPREHENSIVE HEARING TEST: CPT | Performed by: AUDIOLOGIST

## 2023-03-27 PROCEDURE — 92567 TYMPANOMETRY: CPT | Performed by: AUDIOLOGIST

## 2023-03-27 SDOH — ECONOMIC STABILITY: FOOD INSECURITY: WITHIN THE PAST 12 MONTHS, YOU WORRIED THAT YOUR FOOD WOULD RUN OUT BEFORE YOU GOT MONEY TO BUY MORE.: NEVER TRUE

## 2023-03-27 SDOH — ECONOMIC STABILITY: TRANSPORTATION INSECURITY
IN THE PAST 12 MONTHS, HAS LACK OF TRANSPORTATION KEPT YOU FROM MEETINGS, WORK, OR FROM GETTING THINGS NEEDED FOR DAILY LIVING?: NO

## 2023-03-27 SDOH — ECONOMIC STABILITY: FOOD INSECURITY: WITHIN THE PAST 12 MONTHS, THE FOOD YOU BOUGHT JUST DIDN'T LAST AND YOU DIDN'T HAVE MONEY TO GET MORE.: SOMETIMES TRUE

## 2023-03-27 SDOH — ECONOMIC STABILITY: INCOME INSECURITY: HOW HARD IS IT FOR YOU TO PAY FOR THE VERY BASICS LIKE FOOD, HOUSING, MEDICAL CARE, AND HEATING?: NOT VERY HARD

## 2023-03-27 SDOH — ECONOMIC STABILITY: HOUSING INSECURITY
IN THE LAST 12 MONTHS, WAS THERE A TIME WHEN YOU DID NOT HAVE A STEADY PLACE TO SLEEP OR SLEPT IN A SHELTER (INCLUDING NOW)?: NO

## 2023-03-27 NOTE — PROGRESS NOTES
Patient and provider were located at their individual homes. --Radha Carrasco, APRN - CNP on 3/28/2023 at 1:22 PM    An electronic signature was used to authenticate this note. Patient should call the office immediately with new or ongoing signs or symptoms or worsening, or proceed to the emergency room. All entries in chief complaint and history of present illness are reviewed and validated by me. No changes in past medical history, past surgical history, social history, or family history were noted during the patient encounter unless specifically listed above. All updates of past medical history, past surgical history, social history, or family history were reviewed personally by me during the office visit. All problems listed in the assessment are stable unless noted otherwise. Medication profile reviewed personally by me during the office visit. Medication side effects and possible impairments from medications were discussed as applicable. Every effort has been made to assure accurate transcription by this voice recognition software. However, mistakes in transcription may still occur    You are being started on a new medication. All medications have the potential for adverse effects. All medications effect each person differently. Please read and review provided information related to medication. If the medication that you have been prescribed has the potential to cause sedation, do not drive or operate car, truck, or heavy machinery until you know how the medication will effect you. If you experience any adverse effects from the medication, please call the office or report to the emergency department.

## 2023-03-27 NOTE — PROGRESS NOTES
The following items are recommended based on patient report and results from today's appointment:   - Continue medical follow-up with Rosey Butterfield MD.   - Retest hearing as medically indicated and/or sooner if a change in hearing is noted. - Utilize \"Good Communication Strategies\" as discussed to assist in speech understanding with communication partners. - Maintain a sound enriched environment to assist in the management of tinnitus symptoms.  - [facility] to pursue potential [facility] benefit.          Medina Montoya  Audiologist        Degree of   Hearing Sensitivity dB Range   Within Normal Limits (WNL) 0 - 20   Mild 20 - 40   Moderate 40 - 55   Moderately-Severe 55 - 70   Severe 70 - 90   Profound 90 +

## 2023-03-28 ENCOUNTER — OFFICE VISIT (OUTPATIENT)
Dept: FAMILY MEDICINE CLINIC | Age: 53
End: 2023-03-28

## 2023-03-28 VITALS
TEMPERATURE: 97.9 F | DIASTOLIC BLOOD PRESSURE: 82 MMHG | HEART RATE: 72 BPM | SYSTOLIC BLOOD PRESSURE: 122 MMHG | OXYGEN SATURATION: 94 %

## 2023-03-28 DIAGNOSIS — H00.015 HORDEOLUM EXTERNUM OF LEFT LOWER EYELID: Primary | ICD-10-CM

## 2023-03-28 PROCEDURE — 99999 PR OFFICE/OUTPT VISIT,PROCEDURE ONLY: CPT | Performed by: NURSE PRACTITIONER

## 2023-03-28 RX ORDER — DIAZEPAM 2 MG/1
TABLET ORAL
COMMUNITY
Start: 2023-03-12

## 2023-03-28 RX ORDER — POLYMYXIN B SULFATE AND TRIMETHOPRIM 1; 10000 MG/ML; [USP'U]/ML
1 SOLUTION OPHTHALMIC EVERY 4 HOURS
Qty: 1 ML | Refills: 0 | Status: SHIPPED | OUTPATIENT
Start: 2023-03-28 | End: 2023-04-07

## 2023-03-28 ASSESSMENT — ENCOUNTER SYMPTOMS
VOMITING: 0
EYE REDNESS: 1
EYE PAIN: 1
PHOTOPHOBIA: 0
NAUSEA: 0
EYE DISCHARGE: 0
BLURRED VISION: 0
EYE DISCHARGE: 1
EYE ITCHING: 0
DOUBLE VISION: 0
FOREIGN BODY SENSATION: 0

## 2023-03-28 ASSESSMENT — VISUAL ACUITY: OU: 1

## 2023-03-28 NOTE — PROGRESS NOTES
3/28/2023    Chief Complaint   Patient presents with    Facial Swelling     Started 5 days ago but seems better today but she still has a bump on bottom of lid        Rosana Alvarado is a 47 y.o. female, presents today for:      ASSESSMENT/PLAN:    1. Hordeolum externum of left lower eyelid  Continue Polytrim  Call if no improvement. May need to consider oral antibiotics or Ophthalmology referral.        Return if symptoms worsen or fail to improve. Eye Problem   The left eye is affected. This is a new problem. The current episode started in the past 7 days. The problem has been gradually improving (with eye drops last night). There was no injury mechanism (did cry several days and noted knot on eye started at that time). The pain is at a severity of 2/10. The pain is mild. There is No known exposure to pink eye. She Does not wear contacts. Associated symptoms include eye redness. Pertinent negatives include no blurred vision, eye discharge (initially had purulent drainage which has now resolved), double vision, fever, foreign body sensation, itching, nausea, photophobia, recent URI or vomiting. Treatments tried: Warm compresses. The treatment provided moderate relief. Review of Systems   Constitutional:  Negative for fever. Eyes:  Positive for redness. Negative for blurred vision, double vision, photophobia, discharge (initially had purulent drainage which has now resolved) and itching. Gastrointestinal:  Negative for nausea and vomiting. Current Outpatient Medications on File Prior to Visit   Medication Sig Dispense Refill    diazePAM (VALIUM) 2 MG tablet TAKE 1/2 (ONE-HALF) TABLET BY MOUTH TWICE DAILY AS NEEDED FOR ANXIETY MAX DOSE 2 MG DAILY      pregabalin (LYRICA) 25 MG capsule Take 1 capsule by mouth 3 times daily for 30 days.  270 capsule 1    amitriptyline (ELAVIL) 10 MG tablet Take 1 tablet by mouth nightly 90 tablet 1    levalbuterol (XOPENEX HFA) 45 MCG/ACT inhaler Inhale 1 puff into

## 2023-04-03 ENCOUNTER — TELEPHONE (OUTPATIENT)
Dept: AUDIOLOGY | Age: 53
End: 2023-04-03

## 2023-04-03 NOTE — TELEPHONE ENCOUNTER
Tianna from the [facility] called back. She stated she restarted the fax machine in their office and asked I try to re-send the audiograms. Fax sent successfully.

## 2023-04-03 NOTE — TELEPHONE ENCOUNTER
Attempted to send the Audiograms three times, fax report said Failed no answer. Called [facility] to discuss with them but there was no answer, LVM informing them of the failed message and asked for a return call.

## 2023-04-03 NOTE — TELEPHONE ENCOUNTER
Tianna from the [facility] called requesting the patients last two audiograms She asked we fax them to [phone]

## 2023-06-09 LAB
AVERAGE GLUCOSE: 114
HBA1C MFR BLD: 5.6 %

## 2023-06-22 DIAGNOSIS — G89.29 CHRONIC BILATERAL LOW BACK PAIN WITH BILATERAL SCIATICA: ICD-10-CM

## 2023-06-22 DIAGNOSIS — M54.42 CHRONIC BILATERAL LOW BACK PAIN WITH BILATERAL SCIATICA: ICD-10-CM

## 2023-06-22 DIAGNOSIS — F41.1 GAD (GENERALIZED ANXIETY DISORDER): ICD-10-CM

## 2023-06-22 DIAGNOSIS — M54.41 CHRONIC BILATERAL LOW BACK PAIN WITH BILATERAL SCIATICA: ICD-10-CM

## 2023-06-22 DIAGNOSIS — F33.2 SEVERE EPISODE OF RECURRENT MAJOR DEPRESSIVE DISORDER, WITHOUT PSYCHOTIC FEATURES (HCC): ICD-10-CM

## 2023-06-23 RX ORDER — AMITRIPTYLINE HYDROCHLORIDE 10 MG/1
10 TABLET, FILM COATED ORAL NIGHTLY
Qty: 90 TABLET | Refills: 1 | Status: SHIPPED | OUTPATIENT
Start: 2023-06-23

## 2023-07-05 ENCOUNTER — OFFICE VISIT (OUTPATIENT)
Dept: FAMILY MEDICINE CLINIC | Age: 53
End: 2023-07-05
Payer: COMMERCIAL

## 2023-07-05 VITALS
SYSTOLIC BLOOD PRESSURE: 132 MMHG | OXYGEN SATURATION: 97 % | DIASTOLIC BLOOD PRESSURE: 88 MMHG | HEART RATE: 70 BPM | TEMPERATURE: 98.6 F | BODY MASS INDEX: 40.19 KG/M2 | RESPIRATION RATE: 16 BRPM | WEIGHT: 199 LBS

## 2023-07-05 DIAGNOSIS — G47.33 OSA (OBSTRUCTIVE SLEEP APNEA): ICD-10-CM

## 2023-07-05 DIAGNOSIS — G89.29 CHRONIC BILATERAL LOW BACK PAIN WITH BILATERAL SCIATICA: ICD-10-CM

## 2023-07-05 DIAGNOSIS — Z87.898 HISTORY OF POSTOPERATIVE NAUSEA AND VOMITING: ICD-10-CM

## 2023-07-05 DIAGNOSIS — N81.6 CYSTOCELE WITH RECTOCELE: Primary | ICD-10-CM

## 2023-07-05 DIAGNOSIS — K57.92 DIVERTICULITIS: ICD-10-CM

## 2023-07-05 DIAGNOSIS — L01.00 IMPETIGO: ICD-10-CM

## 2023-07-05 DIAGNOSIS — R42 PERSISTENT POSTURAL-PERCEPTUAL DIZZINESS: ICD-10-CM

## 2023-07-05 DIAGNOSIS — F33.2 SEVERE EPISODE OF RECURRENT MAJOR DEPRESSIVE DISORDER, WITHOUT PSYCHOTIC FEATURES (HCC): ICD-10-CM

## 2023-07-05 DIAGNOSIS — Z51.81 MEDICATION MONITORING ENCOUNTER: ICD-10-CM

## 2023-07-05 DIAGNOSIS — F41.1 GAD (GENERALIZED ANXIETY DISORDER): ICD-10-CM

## 2023-07-05 DIAGNOSIS — N81.10 CYSTOCELE WITH RECTOCELE: Primary | ICD-10-CM

## 2023-07-05 DIAGNOSIS — Z01.818 PREOP EXAMINATION: ICD-10-CM

## 2023-07-05 DIAGNOSIS — M54.42 CHRONIC BILATERAL LOW BACK PAIN WITH BILATERAL SCIATICA: ICD-10-CM

## 2023-07-05 DIAGNOSIS — M54.41 CHRONIC BILATERAL LOW BACK PAIN WITH BILATERAL SCIATICA: ICD-10-CM

## 2023-07-05 PROCEDURE — G8427 DOCREV CUR MEDS BY ELIG CLIN: HCPCS | Performed by: NURSE PRACTITIONER

## 2023-07-05 PROCEDURE — 3074F SYST BP LT 130 MM HG: CPT | Performed by: NURSE PRACTITIONER

## 2023-07-05 PROCEDURE — 36415 COLL VENOUS BLD VENIPUNCTURE: CPT | Performed by: NURSE PRACTITIONER

## 2023-07-05 PROCEDURE — G8417 CALC BMI ABV UP PARAM F/U: HCPCS | Performed by: NURSE PRACTITIONER

## 2023-07-05 PROCEDURE — 1036F TOBACCO NON-USER: CPT | Performed by: NURSE PRACTITIONER

## 2023-07-05 PROCEDURE — 3078F DIAST BP <80 MM HG: CPT | Performed by: NURSE PRACTITIONER

## 2023-07-05 PROCEDURE — 99213 OFFICE O/P EST LOW 20 MIN: CPT | Performed by: NURSE PRACTITIONER

## 2023-07-05 PROCEDURE — 3017F COLORECTAL CA SCREEN DOC REV: CPT | Performed by: NURSE PRACTITIONER

## 2023-07-05 RX ORDER — METRONIDAZOLE 500 MG/1
500 TABLET ORAL 3 TIMES DAILY
Qty: 15 TABLET | Refills: 0 | Status: SHIPPED | OUTPATIENT
Start: 2023-07-05 | End: 2023-07-10

## 2023-07-05 RX ORDER — CIPROFLOXACIN 500 MG/1
500 TABLET, FILM COATED ORAL 2 TIMES DAILY
Qty: 10 TABLET | Refills: 0 | Status: SHIPPED | OUTPATIENT
Start: 2023-07-05 | End: 2023-07-10

## 2023-07-05 RX ORDER — PREGABALIN 25 MG/1
25 CAPSULE ORAL 3 TIMES DAILY
Qty: 270 CAPSULE | Refills: 1 | Status: SHIPPED | OUTPATIENT
Start: 2023-07-05 | End: 2024-01-01

## 2023-07-05 RX ORDER — DIAZEPAM 2 MG/1
TABLET ORAL
Qty: 45 TABLET | Refills: 2 | Status: SHIPPED | OUTPATIENT
Start: 2023-07-05 | End: 2023-09-03

## 2023-07-05 RX ORDER — NITROFURANTOIN 25; 75 MG/1; MG/1
1 CAPSULE ORAL 2 TIMES DAILY
COMMUNITY
Start: 2023-06-27

## 2023-07-05 ASSESSMENT — ENCOUNTER SYMPTOMS
VOMITING: 0
SORE THROAT: 0
BACK PAIN: 1
RHINORRHEA: 0
ABDOMINAL PAIN: 0
WHEEZING: 0
SHORTNESS OF BREATH: 1

## 2023-07-05 NOTE — PATIENT INSTRUCTIONS
Stop Ibuprofen 14 days before surgery  Stop Aspirin 7 days before surgery  Do not take any medications the morning of surgery.

## 2023-07-05 NOTE — PROGRESS NOTES
Pre Op Med History  Cold/Chronic Cough/Tuberculosis  --  no  Bronchitis/COPD  --  no  Asthma/SOB  --  yes - sob   Rheumatic Fever/Heart Murmur  --  no  High Blood Pressure/Low Blood Pressure  --  yes off and on but not on med   Swelling of Feet/Fluid In Lungs  --  yes - feet   Heart Attack/Chest Pain  --  no  Irregular, Fast Heartbeats  --  no  Do you bruise easily?   --  yes - bruises and bleed   Anemia  --  yes -   Diabetes/Low Blood Sugar  --  no  Are you Pregnant  --  no  Last Menstrual Period  --  [facility] 2006  Serious Illness During Pregnancy  --  yes toxicemia with both births   Breast Disease  --  cyst   Kidney Disease  --  yes - chronic uti  Jaundice/Hepatitis  --  no  Hiatal Hernia/Peptic Ulcer Disease  --  no  Convulsions/Epilepsy/Stroke  --  no  Polio/Meningitis Paralysis  --  no  Back Pain/ Slipped Disc  --  yes -   Psychological Disease  --  no  Thyroid Disease  --  no  Glaucoma/Visual Impairment  --  no  Skeletal Deformities/Defects/Arthritis --  yes  Loose Teeth/Caps on Front Teeth  --  no  Have you had any Surgery  --  yes -   Any History of anesthesia complication in self or family  -- yes - nauseous  Prostate Disease  --  N/A  Cancer  --  no  DVT/PE/PVD --  no  Weight Loss/Gain  --  yes - weight gain

## 2023-07-05 NOTE — PROGRESS NOTES
Leatha Campos  YOB: 1970    Date of Service:  7/5/2023      Wt Readings from Last 2 Encounters:   07/05/23 199 lb (90.3 kg)   03/21/23 193 lb (87.5 kg)       BP Readings from Last 3 Encounters:   07/05/23 132/88   03/28/23 122/82   01/10/23 122/82        Chief Complaint   Patient presents with    Pre-op Exam     Pre Op 07/24/2023 Prolapse Dr. Shalini Ayon At [facility]           Skin Problem     Has spots on left leg that look infected this [place] a [place]        Allergies   Allergen Reactions    Cymbalta [Duloxetine Hcl] Nausea Only    Albuterol Palpitations       Outpatient Medications Marked as Taking for the 7/5/23 encounter (Office Visit) with KARINA Lechuga CNP   Medication Sig Dispense Refill    diazePAM (VALIUM) 2 MG tablet TAKE 1/2 (ONE-HALF) TABLET BY MOUTH TWICE DAILY AS NEEDED FOR ANXIETY MAX DOSE 2 MG DAILY 45 tablet 2    pregabalin (LYRICA) 25 MG capsule Take 1 capsule by mouth 3 times daily for 180 days. Max Daily Amount: 75 mg 270 capsule 1    nitrofurantoin, macrocrystal-monohydrate, (MACROBID) 100 MG capsule Take 1 capsule by mouth in the morning and at bedtime      ciprofloxacin (CIPRO) 500 MG tablet Take 1 tablet by mouth 2 times daily for 5 days 10 tablet 0    metroNIDAZOLE (FLAGYL) 500 MG tablet Take 1 tablet by mouth 3 times daily for 5 days 15 tablet 0    amitriptyline (ELAVIL) 10 MG tablet Take 1 tablet by mouth nightly 90 tablet 1    levalbuterol (XOPENEX HFA) 45 MCG/ACT inhaler Inhale 1 puff into the lungs every 6 hours as needed for Wheezing or Shortness of Breath 1 each 1       This patient presents to the office todayfor a preoperative consultation at the request of surgeon, Dr. Teri Taylor, who plans on performing Anterior Repair, Posterior Repair, Suburethral Sling on July 24 at [facility]. The current problem began2 years ago, and symptoms have been worsening with time.   Conservative therapy: Yes: multiple

## 2023-07-06 LAB
ANION GAP SERPL CALCULATED.3IONS-SCNC: 12 MMOL/L (ref 3–16)
BUN SERPL-MCNC: 11 MG/DL (ref 7–20)
CALCIUM SERPL-MCNC: 9.9 MG/DL (ref 8.3–10.6)
CHLORIDE SERPL-SCNC: 104 MMOL/L (ref 99–110)
CO2 SERPL-SCNC: 26 MMOL/L (ref 21–32)
CREAT SERPL-MCNC: 0.7 MG/DL (ref 0.6–1.1)
DEPRECATED RDW RBC AUTO: 14 % (ref 12.4–15.4)
GFR SERPLBLD CREATININE-BSD FMLA CKD-EPI: >60 ML/MIN/{1.73_M2}
GLUCOSE SERPL-MCNC: 82 MG/DL (ref 70–99)
HCT VFR BLD AUTO: 37.6 % (ref 36–48)
HGB BLD-MCNC: 12.8 G/DL (ref 12–16)
MCH RBC QN AUTO: 29.2 PG (ref 26–34)
MCHC RBC AUTO-ENTMCNC: 34 G/DL (ref 31–36)
MCV RBC AUTO: 86 FL (ref 80–100)
PLATELET # BLD AUTO: 371 K/UL (ref 135–450)
PMV BLD AUTO: 7.9 FL (ref 5–10.5)
POTASSIUM SERPL-SCNC: 4.7 MMOL/L (ref 3.5–5.1)
RBC # BLD AUTO: 4.37 M/UL (ref 4–5.2)
SODIUM SERPL-SCNC: 142 MMOL/L (ref 136–145)
WBC # BLD AUTO: 9 K/UL (ref 4–11)

## 2023-07-08 LAB
6MAM UR QL: NOT DETECTED
7AMINOCLONAZEPAM UR QL: NOT DETECTED
A-OH ALPRAZ UR QL: NOT DETECTED
ALPHA-OH-MIDAZOLAM, URINE: NOT DETECTED
ALPRAZ UR QL: NOT DETECTED
AMPHET UR QL SCN: NOT DETECTED
ANNOTATION COMMENT IMP: NORMAL
ANNOTATION COMMENT IMP: NORMAL
BARBITURATES UR QL: NOT DETECTED
BUPRENORPHINE UR QL: NOT DETECTED
BZE UR QL: NOT DETECTED
CARBOXYTHC UR QL: NOT DETECTED
CARISOPRODOL UR QL: NOT DETECTED
CLONAZEPAM UR QL: NOT DETECTED
CODEINE UR QL: NOT DETECTED
CREAT UR-MCNC: 153.6 MG/DL (ref 20–400)
DIAZEPAM UR QL: NOT DETECTED
ETHYL GLUCURONIDE UR QL: NOT DETECTED
FENTANYL UR QL: NOT DETECTED
GABAPENTIN: NOT DETECTED
HYDROCODONE UR QL: NOT DETECTED
HYDROMORPHONE UR QL: NOT DETECTED
LORAZEPAM UR QL: NOT DETECTED
MDA UR QL: NOT DETECTED
MDEA UR QL: NOT DETECTED
MDMA UR QL: NOT DETECTED
MEPERIDINE UR QL: NOT DETECTED
METHADONE UR QL: NOT DETECTED
METHAMPHET UR QL: NOT DETECTED
MIDAZOLAM UR QL SCN: NOT DETECTED
MORPHINE UR QL: NOT DETECTED
NALOXONE: NOT DETECTED
NORBUPRENORPHINE UR QL CFM: NOT DETECTED
NORDIAZEPAM UR QL: PRESENT
NORFENTANYL UR QL: NOT DETECTED
NORHYDROCODONE UR QL CFM: NOT DETECTED
NOROXYCODONE UR QL CFM: NOT DETECTED
NOROXYMORPHONE, URINE: NOT DETECTED
OXAZEPAM UR QL: PRESENT
OXYCODONE UR QL: NOT DETECTED
OXYMORPHONE UR QL: NOT DETECTED
PATHOLOGY STUDY: NORMAL
PCP UR QL: NOT DETECTED
PHENTERMINE UR QL: NOT DETECTED
PPAA UR QL: NOT DETECTED
PREGABALIN: PRESENT
SERVICE CMNT-IMP: NORMAL
TAPENTADOL UR QL SCN: NOT DETECTED
TAPENTADOL-O-SULFATE, URINE: NOT DETECTED
TEMAZEPAM UR QL: PRESENT
TRAMADOL UR QL: NOT DETECTED
ZOLPIDEM UR QL: NOT DETECTED

## 2023-07-14 ENCOUNTER — TELEPHONE (OUTPATIENT)
Dept: FAMILY MEDICINE CLINIC | Age: 53
End: 2023-07-14

## 2023-07-14 NOTE — TELEPHONE ENCOUNTER
Patient had a pre-op visit on 7-5-23. Was there an ekg done on that day.  Please fax to #[phone] No risk alerts present

## 2023-07-17 ENCOUNTER — SCHEDULED TELEPHONE ENCOUNTER (OUTPATIENT)
Dept: FAMILY MEDICINE CLINIC | Age: 53
End: 2023-07-17

## 2023-07-17 DIAGNOSIS — H66.92 LEFT ACUTE OTITIS MEDIA: Primary | ICD-10-CM

## 2023-07-17 DIAGNOSIS — B37.9 YEAST INFECTION: ICD-10-CM

## 2023-07-17 RX ORDER — FLUCONAZOLE 150 MG/1
150 TABLET ORAL
Qty: 2 TABLET | Refills: 0 | Status: SHIPPED | OUTPATIENT
Start: 2023-07-17 | End: 2023-07-23

## 2023-07-17 RX ORDER — AMOXICILLIN AND CLAVULANATE POTASSIUM 875; 125 MG/1; MG/1
1 TABLET, FILM COATED ORAL 2 TIMES DAILY
Qty: 10 TABLET | Refills: 0 | Status: SHIPPED | OUTPATIENT
Start: 2023-07-17 | End: 2023-07-22

## 2023-07-17 ASSESSMENT — PATIENT HEALTH QUESTIONNAIRE - PHQ9
SUM OF ALL RESPONSES TO PHQ QUESTIONS 1-9: 0
2. FEELING DOWN, DEPRESSED OR HOPELESS: 0
6. FEELING BAD ABOUT YOURSELF - OR THAT YOU ARE A FAILURE OR HAVE LET YOURSELF OR YOUR FAMILY DOWN: 0
SUM OF ALL RESPONSES TO PHQ QUESTIONS 1-9: 0
4. FEELING TIRED OR HAVING LITTLE ENERGY: 0
5. POOR APPETITE OR OVEREATING: 0
7. TROUBLE CONCENTRATING ON THINGS, SUCH AS READING THE NEWSPAPER OR WATCHING TELEVISION: 0
10. IF YOU CHECKED OFF ANY PROBLEMS, HOW DIFFICULT HAVE THESE PROBLEMS MADE IT FOR YOU TO DO YOUR WORK, TAKE CARE OF THINGS AT HOME, OR GET ALONG WITH OTHER PEOPLE: 0
9. THOUGHTS THAT YOU WOULD BE BETTER OFF DEAD, OR OF HURTING YOURSELF: 0
1. LITTLE INTEREST OR PLEASURE IN DOING THINGS: 0
SUM OF ALL RESPONSES TO PHQ9 QUESTIONS 1 & 2: 0
8. MOVING OR SPEAKING SO SLOWLY THAT OTHER PEOPLE COULD HAVE NOTICED. OR THE OPPOSITE, BEING SO FIGETY OR RESTLESS THAT YOU HAVE BEEN MOVING AROUND A LOT MORE THAN USUAL: 0
3. TROUBLE FALLING OR STAYING ASLEEP: 0

## 2023-07-17 NOTE — PROGRESS NOTES
Romi Spencer is a 47 y.o. female evaluated via telephone on 7/17/2023 for Otalgia (Left ear and it is hurting and swelling more ) and Pharyngitis (Left side  of throat )  . Assessment & Plan   1. Left acute otitis media  Likely ear infection but limited by type of visit. Will give Augmentin for now. Encouraged OTC cough/ cold medication, Tylenol/ Ibuprofen PRN pain  Discussed non pharmacologic measures: Encouraged frequent water intake. Encouraged Gatorade/ Powerade if not drinking fluids, intake of honey/ lemon tea for sore throat, Humidifier at night for cough, hot showers, Netti Pot if needed for congestion. If no improvement, encouraged pt to call for F/U labs/ imaging    -     amoxicillin-clavulanate (AUGMENTIN) 875-125 MG per tablet; Take 1 tablet by mouth 2 times daily for 5 days, Disp-10 tablet, R-0Normal  2. Yeast infection  Will give Diflucan for yeast infections associated with Augmentin. -     fluconazole (DIFLUCAN) 150 MG tablet; Take 1 tablet by mouth every 72 hours for 6 days, Disp-2 tablet, R-0Normal    Return for keep oct. Subjective   Prior to Visit Medications    Medication Sig Taking? Authorizing Provider   fluconazole (DIFLUCAN) 150 MG tablet Take 1 tablet by mouth every 72 hours for 6 days Yes KARINA Beasley CNP   amoxicillin-clavulanate (AUGMENTIN) 875-125 MG per tablet Take 1 tablet by mouth 2 times daily for 5 days Yes KARINA Beasley CNP   diazePAM (VALIUM) 2 MG tablet TAKE 1/2 (ONE-HALF) TABLET BY MOUTH TWICE DAILY AS NEEDED FOR ANXIETY MAX DOSE 2 MG DAILY Yes KARINA Beasley CNP   pregabalin (LYRICA) 25 MG capsule Take 1 capsule by mouth 3 times daily for 180 days.  Max Daily Amount: 75 mg Yes KARINA Beasley CNP   amitriptyline (ELAVIL) 10 MG tablet Take 1 tablet by mouth nightly Yes KARINA Evans CNP   le[facility]buterol [facility]) 45 MCG/ACT inhaler Inhale 1 puff into the lungs every 6 hours as needed for Wheezing or Shortness

## 2023-10-10 ENCOUNTER — TELEPHONE (OUTPATIENT)
Dept: FAMILY MEDICINE CLINIC | Age: 53
End: 2023-10-10

## 2023-10-10 NOTE — TELEPHONE ENCOUNTER
LVM for patient to return call. Barriers to why patient continues to miss appointments? Are we able to assist in future appts so that he does not No Show? Discuss that any appointments that are missed either by No showing or failing to provide 24 hours notice to cancel/reschedule will result in Dismissal from the practice.

## 2023-10-11 NOTE — TELEPHONE ENCOUNTER
Patient forgot about appointment being scheduled. Had thought it was cancelled due to the [employer] insurance issue. Did reschedule.

## 2023-10-16 ENCOUNTER — OFFICE VISIT (OUTPATIENT)
Dept: FAMILY MEDICINE CLINIC | Age: 53
End: 2023-10-16

## 2023-10-16 VITALS
TEMPERATURE: 98 F | HEART RATE: 76 BPM | WEIGHT: 204 LBS | SYSTOLIC BLOOD PRESSURE: 128 MMHG | OXYGEN SATURATION: 98 % | BODY MASS INDEX: 41.2 KG/M2 | DIASTOLIC BLOOD PRESSURE: 88 MMHG

## 2023-10-16 DIAGNOSIS — G89.29 CHRONIC HAND PAIN, LEFT: ICD-10-CM

## 2023-10-16 DIAGNOSIS — R35.0 URINARY FREQUENCY: ICD-10-CM

## 2023-10-16 DIAGNOSIS — M54.42 CHRONIC BILATERAL LOW BACK PAIN WITH BILATERAL SCIATICA: ICD-10-CM

## 2023-10-16 DIAGNOSIS — I10 ESSENTIAL HYPERTENSION: Primary | ICD-10-CM

## 2023-10-16 DIAGNOSIS — F41.1 GAD (GENERALIZED ANXIETY DISORDER): ICD-10-CM

## 2023-10-16 DIAGNOSIS — R06.02 SHORTNESS OF BREATH: ICD-10-CM

## 2023-10-16 DIAGNOSIS — K21.00 GASTROESOPHAGEAL REFLUX DISEASE WITH ESOPHAGITIS WITHOUT HEMORRHAGE: ICD-10-CM

## 2023-10-16 DIAGNOSIS — M54.41 CHRONIC BILATERAL LOW BACK PAIN WITH BILATERAL SCIATICA: ICD-10-CM

## 2023-10-16 DIAGNOSIS — R42 PERSISTENT POSTURAL-PERCEPTUAL DIZZINESS: ICD-10-CM

## 2023-10-16 DIAGNOSIS — K27.9 PEPTIC ULCER: ICD-10-CM

## 2023-10-16 DIAGNOSIS — F33.2 SEVERE EPISODE OF RECURRENT MAJOR DEPRESSIVE DISORDER, WITHOUT PSYCHOTIC FEATURES (HCC): ICD-10-CM

## 2023-10-16 DIAGNOSIS — G89.29 CHRONIC BILATERAL LOW BACK PAIN WITH BILATERAL SCIATICA: ICD-10-CM

## 2023-10-16 DIAGNOSIS — R68.2 DRY MOUTH: ICD-10-CM

## 2023-10-16 DIAGNOSIS — M79.642 CHRONIC HAND PAIN, LEFT: ICD-10-CM

## 2023-10-16 RX ORDER — PREGABALIN 25 MG/1
25 CAPSULE ORAL 3 TIMES DAILY
Qty: 270 CAPSULE | Refills: 2 | Status: SHIPPED | OUTPATIENT
Start: 2023-10-16 | End: 2024-07-12

## 2023-10-16 RX ORDER — ACETAMINOPHEN 325 MG/1
650 TABLET ORAL EVERY 4 HOURS PRN
COMMUNITY
Start: 2023-07-24

## 2023-10-16 RX ORDER — SUCRALFATE 1 G/1
1 TABLET ORAL 4 TIMES DAILY
Qty: 120 TABLET | Refills: 3 | Status: SHIPPED | OUTPATIENT
Start: 2023-10-16

## 2023-10-16 RX ORDER — LEVALBUTEROL TARTRATE 45 UG/1
1 AEROSOL, METERED ORAL EVERY 6 HOURS PRN
Qty: 1 EACH | Refills: 6 | Status: SHIPPED | OUTPATIENT
Start: 2023-10-16 | End: 2024-10-15

## 2023-10-16 RX ORDER — AMITRIPTYLINE HYDROCHLORIDE 10 MG/1
10 TABLET, FILM COATED ORAL NIGHTLY
Qty: 90 TABLET | Refills: 3 | Status: SHIPPED | OUTPATIENT
Start: 2023-10-16

## 2023-10-16 RX ORDER — PANTOPRAZOLE SODIUM 40 MG/1
40 TABLET, DELAYED RELEASE ORAL
Qty: 90 TABLET | Refills: 0 | Status: SHIPPED | OUTPATIENT
Start: 2023-10-16

## 2023-10-16 ASSESSMENT — PATIENT HEALTH QUESTIONNAIRE - PHQ9
4. FEELING TIRED OR HAVING LITTLE ENERGY: 2
10. IF YOU CHECKED OFF ANY PROBLEMS, HOW DIFFICULT HAVE THESE PROBLEMS MADE IT FOR YOU TO DO YOUR WORK, TAKE CARE OF THINGS AT HOME, OR GET ALONG WITH OTHER PEOPLE: 1
SUM OF ALL RESPONSES TO PHQ QUESTIONS 1-9: 7
SUM OF ALL RESPONSES TO PHQ QUESTIONS 1-9: 7
5. POOR APPETITE OR OVEREATING: 0
8. MOVING OR SPEAKING SO SLOWLY THAT OTHER PEOPLE COULD HAVE NOTICED. OR THE OPPOSITE, BEING SO FIGETY OR RESTLESS THAT YOU HAVE BEEN MOVING AROUND A LOT MORE THAN USUAL: 0
3. TROUBLE FALLING OR STAYING ASLEEP: 1
9. THOUGHTS THAT YOU WOULD BE BETTER OFF DEAD, OR OF HURTING YOURSELF: 0
1. LITTLE INTEREST OR PLEASURE IN DOING THINGS: 1
6. FEELING BAD ABOUT YOURSELF - OR THAT YOU ARE A FAILURE OR HAVE LET YOURSELF OR YOUR FAMILY DOWN: 1
2. FEELING DOWN, DEPRESSED OR HOPELESS: 1
SUM OF ALL RESPONSES TO PHQ QUESTIONS 1-9: 7
7. TROUBLE CONCENTRATING ON THINGS, SUCH AS READING THE NEWSPAPER OR WATCHING TELEVISION: 1
SUM OF ALL RESPONSES TO PHQ QUESTIONS 1-9: 7
SUM OF ALL RESPONSES TO PHQ9 QUESTIONS 1 & 2: 2

## 2023-10-19 ENCOUNTER — NURSE ONLY (OUTPATIENT)
Dept: FAMILY MEDICINE CLINIC | Age: 53
End: 2023-10-19
Payer: MEDICAID

## 2023-10-19 DIAGNOSIS — R35.0 URINARY FREQUENCY: ICD-10-CM

## 2023-10-19 DIAGNOSIS — R68.2 DRY MOUTH: ICD-10-CM

## 2023-10-19 DIAGNOSIS — I10 ESSENTIAL HYPERTENSION: ICD-10-CM

## 2023-10-19 LAB
ALBUMIN SERPL-MCNC: 4.5 G/DL (ref 3.4–5)
ALBUMIN/GLOB SERPL: 1.4 {RATIO} (ref 1.1–2.2)
ALP SERPL-CCNC: 125 U/L (ref 40–129)
ALT SERPL-CCNC: 19 U/L (ref 10–40)
ANION GAP SERPL CALCULATED.3IONS-SCNC: 9 MMOL/L (ref 3–16)
AST SERPL-CCNC: 19 U/L (ref 15–37)
BILIRUB SERPL-MCNC: 0.3 MG/DL (ref 0–1)
BUN SERPL-MCNC: 11 MG/DL (ref 7–20)
CALCIUM SERPL-MCNC: 9.3 MG/DL (ref 8.3–10.6)
CHLORIDE SERPL-SCNC: 104 MMOL/L (ref 99–110)
CHOLEST SERPL-MCNC: 205 MG/DL (ref 0–199)
CO2 SERPL-SCNC: 27 MMOL/L (ref 21–32)
CREAT SERPL-MCNC: 0.8 MG/DL (ref 0.6–1.1)
DEPRECATED RDW RBC AUTO: 13.6 % (ref 12.4–15.4)
GFR SERPLBLD CREATININE-BSD FMLA CKD-EPI: >60 ML/MIN/{1.73_M2}
GLUCOSE SERPL-MCNC: 98 MG/DL (ref 70–99)
HCT VFR BLD AUTO: 38.3 % (ref 36–48)
HDLC SERPL-MCNC: 43 MG/DL (ref 40–60)
HGB BLD-MCNC: 13.2 G/DL (ref 12–16)
LDLC SERPL CALC-MCNC: 119 MG/DL
MCH RBC QN AUTO: 28.9 PG (ref 26–34)
MCHC RBC AUTO-ENTMCNC: 34.6 G/DL (ref 31–36)
MCV RBC AUTO: 83.5 FL (ref 80–100)
PLATELET # BLD AUTO: 334 K/UL (ref 135–450)
PMV BLD AUTO: 8.2 FL (ref 5–10.5)
POTASSIUM SERPL-SCNC: 4.1 MMOL/L (ref 3.5–5.1)
PROT SERPL-MCNC: 7.7 G/DL (ref 6.4–8.2)
RBC # BLD AUTO: 4.58 M/UL (ref 4–5.2)
SODIUM SERPL-SCNC: 140 MMOL/L (ref 136–145)
TRIGL SERPL-MCNC: 215 MG/DL (ref 0–150)
VLDLC SERPL CALC-MCNC: 43 MG/DL
WBC # BLD AUTO: 8.1 K/UL (ref 4–11)

## 2023-10-19 PROCEDURE — 36415 COLL VENOUS BLD VENIPUNCTURE: CPT | Performed by: NURSE PRACTITIONER

## 2023-10-20 ENCOUNTER — TELEPHONE (OUTPATIENT)
Dept: FAMILY MEDICINE CLINIC | Age: 53
End: 2023-10-20

## 2023-10-20 LAB
EST. AVERAGE GLUCOSE BLD GHB EST-MCNC: 116.9 MG/DL
HBA1C MFR BLD: 5.7 %

## 2023-10-20 NOTE — TELEPHONE ENCOUNTER
Patient says she was seen for indigestion and was prescribed medication. The indigestion is worse today and she has trouble breathing when she gets to moving around.  Please advise

## 2023-10-20 NOTE — TELEPHONE ENCOUNTER
She can try to increase the Pantoprazole to twice a day. If the shortness of breath is that severe she should go to the ER for evaluation of her heart and other imaging.

## 2023-11-06 ENCOUNTER — COMMUNITY OUTREACH (OUTPATIENT)
Dept: FAMILY MEDICINE CLINIC | Age: 53
End: 2023-11-06

## 2023-11-13 ENCOUNTER — HOSPITAL ENCOUNTER (OUTPATIENT)
Dept: PHYSICAL THERAPY | Age: 53
Setting detail: THERAPIES SERIES
Discharge: HOME | End: 2023-11-13

## 2023-11-13 ENCOUNTER — OFFICE VISIT (OUTPATIENT)
Dept: FAMILY MEDICINE CLINIC | Age: 53
End: 2023-11-13

## 2023-11-13 VITALS
DIASTOLIC BLOOD PRESSURE: 84 MMHG | WEIGHT: 207 LBS | TEMPERATURE: 97 F | HEART RATE: 61 BPM | OXYGEN SATURATION: 98 % | BODY MASS INDEX: 41.81 KG/M2 | SYSTOLIC BLOOD PRESSURE: 142 MMHG

## 2023-11-13 DIAGNOSIS — M79.642 CHRONIC HAND PAIN, LEFT: ICD-10-CM

## 2023-11-13 DIAGNOSIS — I10 ESSENTIAL HYPERTENSION: ICD-10-CM

## 2023-11-13 DIAGNOSIS — Z23 NEED FOR INFLUENZA VACCINATION: ICD-10-CM

## 2023-11-13 DIAGNOSIS — F41.1 GAD (GENERALIZED ANXIETY DISORDER): ICD-10-CM

## 2023-11-13 DIAGNOSIS — M79.89 BILATERAL HAND SWELLING: Primary | ICD-10-CM

## 2023-11-13 DIAGNOSIS — R06.02 SHORTNESS OF BREATH: ICD-10-CM

## 2023-11-13 DIAGNOSIS — F33.2 SEVERE EPISODE OF RECURRENT MAJOR DEPRESSIVE DISORDER, WITHOUT PSYCHOTIC FEATURES (HCC): ICD-10-CM

## 2023-11-13 DIAGNOSIS — G89.29 CHRONIC HAND PAIN, LEFT: ICD-10-CM

## 2023-11-13 DIAGNOSIS — M54.42 CHRONIC BILATERAL LOW BACK PAIN WITH BILATERAL SCIATICA: ICD-10-CM

## 2023-11-13 DIAGNOSIS — M54.41 CHRONIC BILATERAL LOW BACK PAIN WITH BILATERAL SCIATICA: ICD-10-CM

## 2023-11-13 DIAGNOSIS — R42 PERSISTENT POSTURAL-PERCEPTUAL DIZZINESS: ICD-10-CM

## 2023-11-13 DIAGNOSIS — G89.29 CHRONIC BILATERAL LOW BACK PAIN WITH BILATERAL SCIATICA: ICD-10-CM

## 2023-11-13 DIAGNOSIS — M79.89 LEG SWELLING: ICD-10-CM

## 2023-11-13 DIAGNOSIS — M25.642 STIFFNESS OF HAND JOINT, LEFT: Primary | ICD-10-CM

## 2023-11-13 RX ORDER — AMITRIPTYLINE HYDROCHLORIDE 25 MG/1
25 TABLET, FILM COATED ORAL NIGHTLY
Qty: 90 TABLET | Refills: 0 | Status: SHIPPED | OUTPATIENT
Start: 2023-11-13
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
## 2023-11-13 NOTE — FLOWSHEET NOTE
[address] [place] [address] [facility] [address] [place] [address] MUNIR Aguilera [address]  office: [phone] fax: [phone]    Physical Therapy  Cancellation/No-show Note  Patient Name:  Romi Spencer  :  1970   Date:  2023  Cancelled visits to date: 1  No-shows to date: 0    For today's appointment patient:  [x]  Cancelled  []  Rescheduled appointment  []  No-show     Reason given by patient:  []  Patient ill  []  Conflicting appointment  []  No transportation    []  Conflict with work  []  No reason given  [x]  Other:     Comments:  Pt more appropriate for OT referral    Phone call information:  Called and got Patient scheduled for Wednesday at 10:40 at [facility] for OT Evalulation   []  Phone call made today to patient at _ time at number provided:      []  Patient answered, conversation as follows:    []  Patient did not answer, message left as follows:  []  Phone call not made today  []  Phone call not needed - pt contacted us to cancel and provided reason for cancellation.      Electronically signed by:  Amna Walter, PT, MPT,ATC, cert DN

## 2023-11-22 ENCOUNTER — HOSPITAL ENCOUNTER (OUTPATIENT)
Dept: OCCUPATIONAL THERAPY | Age: 53
Setting detail: THERAPIES SERIES
Discharge: HOME | End: 2023-11-22

## 2023-11-22 NOTE — FLOWSHEET NOTE
[address] [place] [address]  Phone: [phone] Fax: [phone]       Occupational Therapy  Cancellation/No-show Note  Patient Name:  Prachi Murphy   :  1970   Date:  2023  Cancelled visits to date: 0  No-shows to date: 1    For today's appointment patient:  []    Cancelled  []    Rescheduled appointment  [x]    No-show     Reason given by patient:  []    Patient ill  []    Conflicting appointment  []    No transportation    []    Conflict with work  []    No reason given  []    Other:     Comments:      Electronically signed by:  Willycandace Roman OTR/L [id number]

## 2023-12-01 SDOH — HEALTH STABILITY: PHYSICAL HEALTH: ON AVERAGE, HOW MANY DAYS PER WEEK DO YOU ENGAGE IN MODERATE TO STRENUOUS EXERCISE (LIKE A BRISK WALK)?: 2 DAYS

## 2023-12-01 SDOH — HEALTH STABILITY: PHYSICAL HEALTH: ON AVERAGE, HOW MANY MINUTES DO YOU ENGAGE IN EXERCISE AT THIS LEVEL?: 30 MIN

## 2023-12-04 ENCOUNTER — TELEPHONE (OUTPATIENT)
Dept: ORTHOPEDIC SURGERY | Age: 53
End: 2023-12-04

## 2023-12-05 SDOH — HEALTH STABILITY: PHYSICAL HEALTH: ON AVERAGE, HOW MANY DAYS PER WEEK DO YOU ENGAGE IN MODERATE TO STRENUOUS EXERCISE (LIKE A BRISK WALK)?: 2 DAYS

## 2023-12-05 SDOH — HEALTH STABILITY: PHYSICAL HEALTH: ON AVERAGE, HOW MANY MINUTES DO YOU ENGAGE IN EXERCISE AT THIS LEVEL?: 30 MIN

## 2023-12-06 ENCOUNTER — OFFICE VISIT (OUTPATIENT)
Dept: ORTHOPEDIC SURGERY | Age: 53
End: 2023-12-06
Payer: MEDICAID

## 2023-12-06 VITALS — HEIGHT: 59 IN | BODY MASS INDEX: 41.73 KG/M2 | WEIGHT: 207 LBS

## 2023-12-06 DIAGNOSIS — M79.642 LEFT HAND PAIN: Primary | ICD-10-CM

## 2023-12-06 DIAGNOSIS — M18.12 ARTHRITIS OF CARPOMETACARPAL (CMC) JOINT OF LEFT THUMB: ICD-10-CM

## 2023-12-06 DIAGNOSIS — M79.645 THUMB PAIN, LEFT: ICD-10-CM

## 2023-12-06 PROCEDURE — L3923 HFO WITHOUT JOINTS PRE CST: HCPCS | Performed by: STUDENT IN AN ORGANIZED HEALTH CARE EDUCATION/TRAINING PROGRAM

## 2023-12-06 PROCEDURE — 99204 OFFICE O/P NEW MOD 45 MIN: CPT | Performed by: STUDENT IN AN ORGANIZED HEALTH CARE EDUCATION/TRAINING PROGRAM

## 2023-12-08 ENCOUNTER — TELEPHONE (OUTPATIENT)
Dept: ORTHOPEDIC SURGERY | Age: 53
End: 2023-12-08

## 2023-12-08 NOTE — TELEPHONE ENCOUNTER
R/C TO PATIENT. PER DR LUGO, NEED TO GIVE INJECTION AT LEAST 3-5 DAYS. CONTINUE WEARING BRACE AND GIVE MORE TIME FOR INJECTION TO START WORKING. PATIENT VERBALIZED UNDERSTANDING.

## 2023-12-08 NOTE — TELEPHONE ENCOUNTER
General Question     Subject: PAIN MED  Patient and /or Facility Request: Derrek Mcguire  Contact Number: [phone]    PT CLLED SAID INJ HELPED FOR A LITTLE BIT NOW PAIN IS BACK AND IF SHE BENDS FINGER IT FEELS LIKE IT IS LOCKING UP  PLEASE CLL PT ABOUT POSSILBE PAIN MED OR NEXT STEP

## 2023-12-13 ENCOUNTER — TELEPHONE (OUTPATIENT)
Dept: PULMONOLOGY | Age: 53
End: 2023-12-13

## 2023-12-13 NOTE — TELEPHONE ENCOUNTER
Patient did not show for npt r/b BREANN Barrios (no prior sleep study) appointment  with Khalif Pathak on 12/13/23    Same Day Cancellation: No    Patient rescheduled:  No    New appointment: N/A      Patient was also no show on: N/A     LOV N/A ( npt r/b  BREANN Barrios)
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
## 2024-01-08 ENCOUNTER — HOSPITAL ENCOUNTER (OUTPATIENT)
Dept: PULMONOLOGY | Age: 54
Discharge: HOME | End: 2024-01-08
Payer: MEDICAID

## 2024-01-08 VITALS — OXYGEN SATURATION: 99 %

## 2024-01-08 DIAGNOSIS — R06.02 SHORTNESS OF BREATH: ICD-10-CM

## 2024-01-08 DIAGNOSIS — K21.00 GASTROESOPHAGEAL REFLUX DISEASE WITH ESOPHAGITIS WITHOUT HEMORRHAGE: ICD-10-CM

## 2024-01-08 DIAGNOSIS — K27.9 PEPTIC ULCER: ICD-10-CM

## 2024-01-08 LAB
DLCO %PRED: 70 %
DLCO PRED: NORMAL
DLCO/VA %PRED: NORMAL
DLCO/VA PRED: NORMAL
DLCO/VA: NORMAL
DLCO: NORMAL
EXPIRATORY TIME-POST: NORMAL
EXPIRATORY TIME: NORMAL
FEF 25-75% %CHNG: NORMAL
FEF 25-75% %PRED-POST: NORMAL
FEF 25-75% %PRED-PRE: NORMAL
FEF 25-75% PRED: NORMAL
FEF 25-75%-POST: NORMAL
FEF 25-75%-PRE: NORMAL
FEV1 %PRED-POST: NORMAL
FEV1 %PRED-PRE: 92 %
FEV1 PRED: NORMAL
FEV1-POST: NORMAL
FEV1-PRE: NORMAL
FEV1/FVC %PRED-POST: NORMAL
FEV1/FVC %PRED-PRE: NORMAL
FEV1/FVC PRED: NORMAL
FEV1/FVC-POST: NORMAL
FEV1/FVC-PRE: 76 %
FVC %PRED-POST: NORMAL
FVC %PRED-PRE: NORMAL
FVC PRED: NORMAL
FVC-POST: NORMAL
FVC-PRE: NORMAL
GAW %PRED: NORMAL
GAW PRED: NORMAL
GAW: NORMAL
IC %PRED: NORMAL
IC PRED: NORMAL
IC: NORMAL
MEP: NORMAL
MIP: NORMAL
MVV %PRED-PRE: NORMAL
MVV PRED: NORMAL
MVV-PRE: NORMAL
PEF %PRED-POST: NORMAL
PEF %PRED-PRE: NORMAL
PEF PRED: NORMAL
PEF%CHNG: NORMAL
PEF-POST: NORMAL
PEF-PRE: NORMAL
RAW %PRED: NORMAL
RAW PRED: NORMAL
RAW: NORMAL
RV %PRED: NORMAL
RV PRED: NORMAL
RV: NORMAL
SVC %PRED: NORMAL
SVC PRED: NORMAL
SVC: NORMAL
TLC %PRED: 99 %
TLC PRED: NORMAL
TLC: NORMAL
VA %PRED: NORMAL
VA PRED: NORMAL
VA: NORMAL
VTG %PRED: NORMAL
VTG PRED: NORMAL
VTG: NORMAL

## 2024-01-08 PROCEDURE — 94726 PLETHYSMOGRAPHY LUNG VOLUMES: CPT

## 2024-01-08 PROCEDURE — 94729 DIFFUSING CAPACITY: CPT

## 2024-01-08 PROCEDURE — 94010 BREATHING CAPACITY TEST: CPT

## 2024-01-08 PROCEDURE — 94760 N-INVAS EAR/PLS OXIMETRY 1: CPT

## 2024-01-08 RX ORDER — PANTOPRAZOLE SODIUM 40 MG/1
40 TABLET, DELAYED RELEASE ORAL 2 TIMES DAILY
Qty: 60 TABLET | Refills: 3 | Status: SHIPPED | OUTPATIENT
Start: 2024-01-08 | End: 2024-05-07

## 2024-01-08 ASSESSMENT — PULMONARY FUNCTION TESTS
FEV1_PERCENT_PREDICTED_PRE: 92
FEV1/FVC_PRE: 76

## 2024-01-10 ENCOUNTER — OFFICE VISIT (OUTPATIENT)
Dept: PULMONOLOGY | Age: 54
End: 2024-01-10
Payer: MEDICAID

## 2024-01-10 VITALS
HEART RATE: 84 BPM | RESPIRATION RATE: 17 BRPM | DIASTOLIC BLOOD PRESSURE: 90 MMHG | BODY MASS INDEX: 43.18 KG/M2 | HEIGHT: 59 IN | WEIGHT: 214.2 LBS | OXYGEN SATURATION: 98 % | SYSTOLIC BLOOD PRESSURE: 140 MMHG

## 2024-01-10 DIAGNOSIS — G47.19 EXCESSIVE DAYTIME SLEEPINESS: ICD-10-CM

## 2024-01-10 DIAGNOSIS — J45.909 UNCOMPLICATED ASTHMA, UNSPECIFIED ASTHMA SEVERITY, UNSPECIFIED WHETHER PERSISTENT: ICD-10-CM

## 2024-01-10 DIAGNOSIS — R06.81 WITNESSED APNEIC SPELLS: Primary | ICD-10-CM

## 2024-01-10 DIAGNOSIS — R06.83 SNORING: ICD-10-CM

## 2024-01-10 PROBLEM — F41.0 GENERALIZED ANXIETY DISORDER WITH PANIC ATTACKS: Chronic | Status: ACTIVE | Noted: 2022-09-13

## 2024-01-10 PROBLEM — F41.1 GENERALIZED ANXIETY DISORDER WITH PANIC ATTACKS: Chronic | Status: ACTIVE | Noted: 2022-09-13

## 2024-01-10 PROCEDURE — 3077F SYST BP >= 140 MM HG: CPT

## 2024-01-10 PROCEDURE — 3080F DIAST BP >= 90 MM HG: CPT

## 2024-01-10 PROCEDURE — 99214 OFFICE O/P EST MOD 30 MIN: CPT

## 2024-01-10 RX ORDER — FLUTICASONE FUROATE, UMECLIDINIUM BROMIDE AND VILANTEROL TRIFENATATE 200; 62.5; 25 UG/1; UG/1; UG/1
1 POWDER RESPIRATORY (INHALATION) DAILY
Qty: 1 EACH | Refills: 0 | Status: SHIPPED | COMMUNITY
Start: 2024-01-10

## 2024-01-10 ASSESSMENT — SLEEP AND FATIGUE QUESTIONNAIRES
HOW LIKELY ARE YOU TO NOD OFF OR FALL ASLEEP WHILE SITTING INACTIVE IN A PUBLIC PLACE: 3
HOW LIKELY ARE YOU TO NOD OFF OR FALL ASLEEP WHILE SITTING AND READING: 3
HOW LIKELY ARE YOU TO NOD OFF OR FALL ASLEEP WHEN YOU ARE A PASSENGER IN A CAR FOR AN HOUR WITHOUT A BREAK: 3
NECK CIRCUMFERENCE (INCHES): 15
ESS TOTAL SCORE: 18
HOW LIKELY ARE YOU TO NOD OFF OR FALL ASLEEP WHILE WATCHING TV: 3
HOW LIKELY ARE YOU TO NOD OFF OR FALL ASLEEP WHILE SITTING AND TALKING TO SOMEONE: 1
HOW LIKELY ARE YOU TO NOD OFF OR FALL ASLEEP WHILE SITTING QUIETLY AFTER LUNCH WITHOUT ALCOHOL: 0
HOW LIKELY ARE YOU TO NOD OFF OR FALL ASLEEP IN A CAR, WHILE STOPPED FOR A FEW MINUTES IN TRAFFIC: 2
HOW LIKELY ARE YOU TO NOD OFF OR FALL ASLEEP WHILE LYING DOWN TO REST IN THE AFTERNOON WHEN CIRCUMSTANCES PERMIT: 3

## 2024-01-10 NOTE — PATIENT INSTRUCTIONS
Trelegy is 1 steady inhalation every morning (3-4 seconds).  Rinse mouth and spit after use.     Albuterol is still 2 puffs on an as needed basis, on an as-needed basis.       What's next:  Schedule a study with the sleep lab (call them at [phone] if you do not receive their call.)    Read through the sleep hygiene tips below to see what kind of changes you can start working on in the meantime, while awaiting your sleep study.    We will meet after your sleep study to discuss results, options and recommendations from there.       It was great to meet you and take care Leatha!  -Miranda    ______________________________________________________________________  Never drive a car or operate a motorized vehicle while drowsy or sleepy.   ______________________________________________________________________        Sleep Hygiene... Important practices for better sleep:    Avoid naps. This will ensure you are sleepy at bedtime.  If you have to take a nap, sleep less than 1 hour, before 3 pm.  SCHEDULE: Have a fixed bedtime and awakening time. The human body thrives on routines. Only deviate from these set sleep times about 1-2 hours on the weekends (more than this will start altering your internal clock). You will feel better keeping a regular sleep cycle and giving your body a dependable pattern, even (especially) if you are retired or not working.   Use light to train your biological clock: When you get up in the morning, exposure yourself to bright lights. When preparing for bed, dim the lights and avoid exposure to screens.  The blue light from electronic screens tells our brain that it is time to be awake; it inhibits melatonin production which stops our brain from helping us get to sleep.   Go to bed only when sleepy; this reduces the time you are awake in bed (which can lead to frustration and negative thoughts about sleep). If you can't fall asleep within 15-30 minutes, get up and do something boring until you
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
## 2024-01-15 DIAGNOSIS — F41.1 GAD (GENERALIZED ANXIETY DISORDER): ICD-10-CM

## 2024-01-15 DIAGNOSIS — M54.41 CHRONIC BILATERAL LOW BACK PAIN WITH BILATERAL SCIATICA: ICD-10-CM

## 2024-01-15 DIAGNOSIS — M54.42 CHRONIC BILATERAL LOW BACK PAIN WITH BILATERAL SCIATICA: ICD-10-CM

## 2024-01-15 DIAGNOSIS — F33.2 SEVERE EPISODE OF RECURRENT MAJOR DEPRESSIVE DISORDER, WITHOUT PSYCHOTIC FEATURES (HCC): ICD-10-CM

## 2024-01-15 DIAGNOSIS — K21.00 GASTROESOPHAGEAL REFLUX DISEASE WITH ESOPHAGITIS WITHOUT HEMORRHAGE: ICD-10-CM

## 2024-01-15 DIAGNOSIS — G89.29 CHRONIC BILATERAL LOW BACK PAIN WITH BILATERAL SCIATICA: ICD-10-CM

## 2024-01-15 DIAGNOSIS — K27.9 PEPTIC ULCER: ICD-10-CM

## 2024-01-15 RX ORDER — PANTOPRAZOLE SODIUM 40 MG/1
40 TABLET, DELAYED RELEASE ORAL 2 TIMES DAILY
Qty: 60 TABLET | Refills: 3 | Status: SHIPPED | OUTPATIENT
Start: 2024-01-15 | End: 2024-05-14

## 2024-01-15 RX ORDER — AMITRIPTYLINE HYDROCHLORIDE 25 MG/1
25 TABLET, FILM COATED ORAL NIGHTLY
Qty: 90 TABLET | Refills: 0 | Status: SHIPPED | OUTPATIENT
Start: 2024-01-15

## 2024-01-15 NOTE — TELEPHONE ENCOUNTER
Refill Request     CONFIRM preferrred pharmacy with the patient.    If Mail Order Rx - Pend for 90 day refill.      Last Seen: Last Seen Department: 11/13/2023  Last Seen by PCP: 11/13/2023    Last Written: 1-8-2024    If no future appointment scheduled, route STAFF MESSAGE with patient name to the  Pool for scheduling.      Next Appointment:   Future Appointments   Date Time Provider Department Center   2/6/2024  9:30 AM [facility]   2/7/2024 12:30 PM [facility] E[facility]OM 01 [facility]   2/20/2024 10:00 AM Elda Sutherland MD [facility] NEURO Neurology -   2/20/2024 10:30 AM Miranda Syed PA-C CLERM PULM [facility]       Message sent to  to schedule appt with patient?  N/A      Requested Prescriptions     Pending Prescriptions Disp Refills    pantoprazole (PROTONIX) 40 MG tablet 60 tablet 3     Sig: Take 1 tablet by mouth in the morning and at bedtime

## 2024-01-15 NOTE — TELEPHONE ENCOUNTER
Refill Request     CONFIRM preferrred pharmacy with the patient.    If Mail Order Rx - Pend for 90 day refill.      Last Seen: Last Seen Department: 11/13/2023  Last Seen by PCP: 11/13/2023    Last Written: 11-    If no future appointment scheduled, route STAFF MESSAGE with patient name to the  Pool for scheduling.      Next Appointment:   Future Appointments   Date Time Provider Department Center   2/6/2024  9:30 AM [facility]   2/7/2024 12:30 PM [facility] ECHO [facility]OM 01 [facility]   2/20/2024 10:00 AM Elda Sutherland MD [facility] NEURO Neurology -   2/20/2024 10:30 AM Miranda Syed PA-C CLERM PULM [facility]       Message sent to  to schedule appt with patient?  NO      Requested Prescriptions     Pending Prescriptions Disp Refills    amitriptyline (ELAVIL) 25 MG tablet 90 tablet 0     Sig: Take 1 tablet by mouth nightly

## 2024-01-22 ENCOUNTER — TELEPHONE (OUTPATIENT)
Dept: PULMONOLOGY | Age: 54
End: 2024-01-22

## 2024-01-22 DIAGNOSIS — J45.909 UNCOMPLICATED ASTHMA, UNSPECIFIED ASTHMA SEVERITY, UNSPECIFIED WHETHER PERSISTENT: Primary | ICD-10-CM

## 2024-01-22 NOTE — TELEPHONE ENCOUNTER
I am glad she is getting some relief.  We could get her a sample of Breztri which would be twice a day and see if this works better for her.    I'm not sure what would be an explanation for that feeling -- if it is new since the Trelegy then lets see if it gets better off of it.  If it has been ongoing prior to the Trelegy then may be due to asthma, which causes excess mucus production but I don't have an explanation for why it would be worse after drinking, so may need to discuss with PCP.

## 2024-01-22 NOTE — TELEPHONE ENCOUNTER
Pt called to update Miranda on effects of Trelegy. She said she takes it in the early Am said she gets good relief right away, \"it opens her up\" but late evening 7-8 she is SOB again.\"   Also she voiced concerns about having a wet or mucouy feeling in AM in her throat, and is worse each time after drinking. Pt is worried about possible aspiration?  Please advise

## 2024-01-23 NOTE — TELEPHONE ENCOUNTER
Pt stated that this happen before the trelegy.  Pt really likes the trelegy Pt is aware of tish maldonado

## 2024-01-25 ENCOUNTER — HOSPITAL ENCOUNTER (OUTPATIENT)
Dept: SLEEP CENTER | Age: 54
Discharge: HOME | End: 2024-01-27
Payer: MEDICAID

## 2024-01-25 DIAGNOSIS — R06.83 SNORING: ICD-10-CM

## 2024-01-25 DIAGNOSIS — G47.19 EXCESSIVE DAYTIME SLEEPINESS: ICD-10-CM

## 2024-01-25 DIAGNOSIS — R06.81 WITNESSED APNEIC SPELLS: ICD-10-CM

## 2024-01-25 PROCEDURE — 95806 SLEEP STUDY UNATT&RESP EFFT: CPT

## 2024-01-26 RX ORDER — FLUTICASONE FUROATE, UMECLIDINIUM BROMIDE AND VILANTEROL TRIFENATATE 200; 62.5; 25 UG/1; UG/1; UG/1
1 POWDER RESPIRATORY (INHALATION) DAILY
Qty: 1 EACH | Refills: 2 | Status: CANCELLED | OUTPATIENT
Start: 2024-01-26

## 2024-01-26 RX ORDER — FLUTICASONE FUROATE, UMECLIDINIUM BROMIDE AND VILANTEROL TRIFENATATE 200; 62.5; 25 UG/1; UG/1; UG/1
1 POWDER RESPIRATORY (INHALATION) DAILY
Qty: 1 EACH | Refills: 3 | Status: SHIPPED | OUTPATIENT
Start: 2024-01-26 | End: 2024-02-02

## 2024-01-26 NOTE — TELEPHONE ENCOUNTER
I sent Rx.  Appears PA is needed.  Pt to call back if not covered by insurance and we will have to find alternative that is on formulary.

## 2024-01-29 PROBLEM — G47.19 EXCESSIVE DAYTIME SLEEPINESS: Status: ACTIVE | Noted: 2024-01-29

## 2024-01-29 PROBLEM — R06.83 SNORING: Status: ACTIVE | Noted: 2024-01-29

## 2024-01-29 PROBLEM — R06.81 WITNESSED APNEIC SPELLS: Status: ACTIVE | Noted: 2024-01-29

## 2024-01-30 ENCOUNTER — TELEPHONE (OUTPATIENT)
Dept: PULMONOLOGY | Age: 54
End: 2024-01-30

## 2024-01-30 DIAGNOSIS — J45.909 UNCOMPLICATED ASTHMA, UNSPECIFIED ASTHMA SEVERITY, UNSPECIFIED WHETHER PERSISTENT: Primary | ICD-10-CM

## 2024-01-30 NOTE — TELEPHONE ENCOUNTER
PA for fluticasone-umeclidin-vilant (TRELEGY ELLIPTA) 200-62.5-25 MCG/ACT AEPB inhaler  was forwarded to [facility] PA dept to process.  Awaiting response.

## 2024-01-30 NOTE — TELEPHONE ENCOUNTER
Submitted PA for TRELEGY  [facility] Key: [id number] STATUS: PENDING.    Follow up done daily; if no decision with in three days we will refax.  If another three days goes by with no decision will call the insurance for status.

## 2024-01-31 NOTE — TELEPHONE ENCOUNTER
Pt is advised to call insurance to see what inhaler can replace trelegy.  Waiting for Pt to call back

## 2024-02-01 NOTE — TELEPHONE ENCOUNTER
Pt called back stating that her insurance is saying she has to try 2 alteratives before they will cover Trelegy.  Formulary alternatives listed on the denial are: Anoro, Serevent, Dulera, Advair, Atrovent HFA, Combivent Respimart, Spiriva Respimat or Handihaler, Asmanex, Flovent, Pulmicort (this info is listed on the denial as well).  Please advise if any formulary alternative would be appropriate?     OV 1/10/24:    ASSESSMENT:  Excessive daytime sleepiness  Witnessed apneas  Severe snoring  Poor sleep hygiene   Hypnagogic & hypnopompic hallucinations, when sleep deprived  TRAVIS, suspect asthma.  But only mild relief from RICK   Comorbid conditions: Obesity, HTN, JOSETTE, MDD   Never/minimal smoker   Family h/o asthma      PLAN:   Trial Trelegy 200 sample x1, call with result   Has Xopenex HFA, albuterol caused tremor   Sleep hygiene tips discussed & provided  I specifically cautioned and emphasized to the patient absolutely no driving motorized vehicles or operating heavy machinery while fatigued, drowsy or sleepy under any conditions.   Weight loss is recommended as a long-term intervention.  Pt has cut back on soda but still drinking 3 per day.  Encouraged switching to diet.   Pathophysiology & complications of untreated BREANN were discussed to include systemic HTN, pulmonary HTN, CV morbidities, car accidents & all-cause mortality   HST, priority study  F/U after sleep study

## 2024-02-02 RX ORDER — FLUTICASONE PROPIONATE AND SALMETEROL 500; 50 UG/1; UG/1
1 POWDER RESPIRATORY (INHALATION) EVERY 12 HOURS
Qty: 60 EACH | Refills: 3 | Status: SHIPPED | OUTPATIENT
Start: 2024-02-02

## 2024-02-02 RX ORDER — TIOTROPIUM BROMIDE INHALATION SPRAY 1.56 UG/1
2 SPRAY, METERED RESPIRATORY (INHALATION) DAILY
Qty: 1 EACH | Refills: 3 | Status: SHIPPED | OUTPATIENT
Start: 2024-02-02

## 2024-02-02 NOTE — TELEPHONE ENCOUNTER
Please tell patient I sent Advair and Spiriva which should both be on formulary for her insurance and would be used together to make up the 3 types of medicines that are and Trelegy.    I recommend asking the pharmacist for teaching of how to use the devices and/or searching YouTube videos for proper use.

## 2024-02-07 ENCOUNTER — HOSPITAL ENCOUNTER (OUTPATIENT)
Dept: NON INVASIVE DIAGNOSTICS | Age: 54
Discharge: HOME | End: 2024-02-07
Payer: MEDICAID

## 2024-02-07 DIAGNOSIS — R06.02 SHORTNESS OF BREATH: ICD-10-CM

## 2024-02-07 DIAGNOSIS — R42 PERSISTENT POSTURAL-PERCEPTUAL DIZZINESS: ICD-10-CM

## 2024-02-07 DIAGNOSIS — M79.89 LEG SWELLING: ICD-10-CM

## 2024-02-07 DIAGNOSIS — M79.89 BILATERAL HAND SWELLING: ICD-10-CM

## 2024-02-07 PROCEDURE — 93306 TTE W/DOPPLER COMPLETE: CPT

## 2024-02-07 NOTE — RESULT ENCOUNTER NOTE
Heart ultrasound is normal.  How is the hand swelling?  If still having some issues we could always go see Cardio to make sure no heart issues.

## 2024-02-09 ENCOUNTER — OFFICE VISIT (OUTPATIENT)
Dept: PULMONOLOGY | Age: 54
End: 2024-02-09
Payer: MEDICAID

## 2024-02-09 VITALS
DIASTOLIC BLOOD PRESSURE: 70 MMHG | WEIGHT: 212.2 LBS | RESPIRATION RATE: 17 BRPM | BODY MASS INDEX: 42.78 KG/M2 | SYSTOLIC BLOOD PRESSURE: 118 MMHG | HEART RATE: 59 BPM | OXYGEN SATURATION: 98 % | HEIGHT: 59 IN

## 2024-02-09 DIAGNOSIS — R06.02 SHORTNESS OF BREATH: Primary | ICD-10-CM

## 2024-02-09 DIAGNOSIS — G47.19 EXCESSIVE DAYTIME SLEEPINESS: ICD-10-CM

## 2024-02-09 DIAGNOSIS — G47.33 SEVERE OBSTRUCTIVE SLEEP APNEA: Primary | ICD-10-CM

## 2024-02-09 PROBLEM — R06.83 SNORING: Status: RESOLVED | Noted: 2024-01-29 | Resolved: 2024-02-09

## 2024-02-09 PROCEDURE — 99214 OFFICE O/P EST MOD 30 MIN: CPT

## 2024-02-09 PROCEDURE — 3074F SYST BP LT 130 MM HG: CPT

## 2024-02-09 PROCEDURE — 3078F DIAST BP <80 MM HG: CPT

## 2024-02-09 ASSESSMENT — SLEEP AND FATIGUE QUESTIONNAIRES
NECK CIRCUMFERENCE (INCHES): 15.5
HOW LIKELY ARE YOU TO NOD OFF OR FALL ASLEEP WHEN YOU ARE A PASSENGER IN A CAR FOR AN HOUR WITHOUT A BREAK: 3
HOW LIKELY ARE YOU TO NOD OFF OR FALL ASLEEP WHILE SITTING AND READING: 3
HOW LIKELY ARE YOU TO NOD OFF OR FALL ASLEEP WHILE LYING DOWN TO REST IN THE AFTERNOON WHEN CIRCUMSTANCES PERMIT: 1
HOW LIKELY ARE YOU TO NOD OFF OR FALL ASLEEP WHILE SITTING INACTIVE IN A PUBLIC PLACE: 2
HOW LIKELY ARE YOU TO NOD OFF OR FALL ASLEEP IN A CAR, WHILE STOPPED FOR A FEW MINUTES IN TRAFFIC: 2
HOW LIKELY ARE YOU TO NOD OFF OR FALL ASLEEP WHILE WATCHING TV: 3
ESS TOTAL SCORE: 17
HOW LIKELY ARE YOU TO NOD OFF OR FALL ASLEEP WHILE SITTING AND TALKING TO SOMEONE: 1
HOW LIKELY ARE YOU TO NOD OFF OR FALL ASLEEP WHILE SITTING QUIETLY AFTER LUNCH WITHOUT ALCOHOL: 2
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
## 2024-02-09 NOTE — PATIENT INSTRUCTIONS
[facility]  Oxygen/[phone] [address] [place] [address]   [facility]  Oxygen/[phone]  [phone] [place] [address]   [facility]  [phone] [address] [place] [address]  **[address]  ([facility])  Oxygen/PAP/NIV   [phone]  [phone] [address] [place] [address]  **[address] [place]   [facility]  [phone] [address] [place] [address]   [facility] [place]  [phone] [address] [place] [address]  **[place] [facility]  [phone] [address] [place] [address]   [facility] [place]   Oxygen/[phone] [address] [place], [place] [address]   [facility] [place]  Oxygen/[phone]  Option 4 [phone] [address] [place] [address]   [facility] [phone] [address] [place] [address]   [facility]  Oxygen/[phone] [address] [facility] [place] [address]   [facility]  Oxygen/[phone] [address] [place] [address]  **Near [place]**   [facility]  Oxygen/[phone]  [phone] [address] [place] [address]   [facility]  Oxygen/[phone] [address] [place] [address]   [facility]  ([facility])  CPAP only [phone] [address] [place] [address]  **Near [facility] [phone]    1-800 CPAP (store) [phone] [place]   [facility]  [phone]    [place]

## 2024-02-10 DIAGNOSIS — K21.00 GASTROESOPHAGEAL REFLUX DISEASE WITH ESOPHAGITIS WITHOUT HEMORRHAGE: ICD-10-CM

## 2024-02-10 DIAGNOSIS — K27.9 PEPTIC ULCER: ICD-10-CM

## 2024-02-13 RX ORDER — SUCRALFATE 1 G/1
1 TABLET ORAL 4 TIMES DAILY
Qty: 120 TABLET | Refills: 0 | Status: SHIPPED | OUTPATIENT
Start: 2024-02-13

## 2024-02-19 ENCOUNTER — OFFICE VISIT (OUTPATIENT)
Dept: ORTHOPEDIC SURGERY | Age: 54
End: 2024-02-19
Payer: MEDICAID

## 2024-02-19 VITALS — WEIGHT: 212 LBS | HEIGHT: 59 IN | BODY MASS INDEX: 42.74 KG/M2

## 2024-02-19 DIAGNOSIS — G56.02 LEFT CARPAL TUNNEL SYNDROME: Primary | ICD-10-CM

## 2024-02-19 PROCEDURE — 99213 OFFICE O/P EST LOW 20 MIN: CPT | Performed by: STUDENT IN AN ORGANIZED HEALTH CARE EDUCATION/TRAINING PROGRAM

## 2024-02-19 NOTE — PROGRESS NOTES
Hand, Upper Extremity and Reconstructive Surgery                Fatuma Gill MD                                           Summary of Upper Extremity Problems and Interventions     Diagnosis: Left thumb pain    History of Present Illness     Leatha Campos is a 54 y.o. right hand dominant female who presents for follow-up of left thumb pain.  Patient had a steroid injection on 12/6/2023.  She had improvement of her symptoms for 3 to 4 weeks.  However since that time she has had significant left thumb pain.  She states she can barely pick anything up.  She has 7/10 pain constantly.  Occasionally gets worse when she hits it on things.  The pain wakes her up at night.  She is unable to hold or do most things with her left hand.  She does not do any kind of heavy work mostly household items.  She cares for her 6-year-old granddaughter.  She does also report some numbness and tingling in her thumb and index finger.    Allergies     Allergies   Allergen Reactions    Cymbalta [Duloxetine Hcl] Nausea Only    Albuterol Palpitations       Home Medications     Current Outpatient Medications   Medication Instructions    acetaminophen (TYLENOL) 650 mg, Oral, EVERY 4 HOURS PRN    amitriptyline (ELAVIL) 25 mg, Oral, NIGHTLY    fluticasone-salmeterol (ADVAIR DISKUS) 500-50 MCG/ACT AEPB diskus inhaler 1 puff, Inhalation, EVERY 12 HOURS, Rinse mouth after use.    pantoprazole (PROTONIX) 40 mg, Oral, 2 times daily    pregabalin (LYRICA) 25 mg, Oral, 3 TIMES DAILY    sucralfate (CARAFATE) 1 g, Oral, 4 TIMES DAILY    tiotropium (SPIRIVA RESPIMAT) 1.25 MCG/ACT AERS inhaler 2 puffs, Inhalation, DAILY       Past Medical History     Past Medical History:   Diagnosis Date    Anxiety     Chronic back pain     Cyst (solitary) of breast     RIGHT BREAST    Depression     Hypertension        Past Surgical History     Past Surgical History:   Procedure Laterality Date    BACK SURGERY  11/30/2016    L3/L4, L4/5

## 2024-03-05 ENCOUNTER — OFFICE VISIT (OUTPATIENT)
Dept: FAMILY MEDICINE CLINIC | Age: 54
End: 2024-03-05
Payer: MEDICAID

## 2024-03-05 VITALS
SYSTOLIC BLOOD PRESSURE: 128 MMHG | HEART RATE: 87 BPM | WEIGHT: 211 LBS | OXYGEN SATURATION: 96 % | BODY MASS INDEX: 42.62 KG/M2 | TEMPERATURE: 100.4 F | DIASTOLIC BLOOD PRESSURE: 86 MMHG

## 2024-03-05 DIAGNOSIS — G89.29 CHRONIC HAND PAIN, LEFT: ICD-10-CM

## 2024-03-05 DIAGNOSIS — M79.89 BILATERAL HAND SWELLING: ICD-10-CM

## 2024-03-05 DIAGNOSIS — M54.42 CHRONIC BILATERAL LOW BACK PAIN WITH BILATERAL SCIATICA: ICD-10-CM

## 2024-03-05 DIAGNOSIS — M79.642 CHRONIC HAND PAIN, LEFT: ICD-10-CM

## 2024-03-05 DIAGNOSIS — M54.41 CHRONIC BILATERAL LOW BACK PAIN WITH BILATERAL SCIATICA: ICD-10-CM

## 2024-03-05 DIAGNOSIS — G47.33 SEVERE OBSTRUCTIVE SLEEP APNEA: ICD-10-CM

## 2024-03-05 DIAGNOSIS — I10 ESSENTIAL HYPERTENSION: Chronic | ICD-10-CM

## 2024-03-05 DIAGNOSIS — G89.29 CHRONIC BILATERAL LOW BACK PAIN WITH BILATERAL SCIATICA: ICD-10-CM

## 2024-03-05 DIAGNOSIS — J40 BRONCHITIS: Primary | ICD-10-CM

## 2024-03-05 PROCEDURE — 3074F SYST BP LT 130 MM HG: CPT | Performed by: NURSE PRACTITIONER

## 2024-03-05 PROCEDURE — 99214 OFFICE O/P EST MOD 30 MIN: CPT | Performed by: NURSE PRACTITIONER

## 2024-03-05 PROCEDURE — 3079F DIAST BP 80-89 MM HG: CPT | Performed by: NURSE PRACTITIONER

## 2024-03-05 RX ORDER — GUAIFENESIN AND DEXTROMETHORPHAN HYDROBROMIDE 600; 30 MG/1; MG/1
1 TABLET, EXTENDED RELEASE ORAL 2 TIMES DAILY PRN
Qty: 28 TABLET | Refills: 0 | Status: SHIPPED | OUTPATIENT
Start: 2024-03-05

## 2024-03-05 RX ORDER — AZITHROMYCIN 250 MG/1
TABLET, FILM COATED ORAL
Qty: 6 TABLET | Refills: 0 | Status: SHIPPED | OUTPATIENT
Start: 2024-03-05 | End: 2024-03-15

## 2024-03-05 NOTE — PATIENT INSTRUCTIONS
[facility]  Service options: In-store shopping · In-store pickup · Delivery  Address: [address] [place] [address] [place] [address]  Open ? Closes 7?PM  Phone: [phone]    Insurance will not cover these medications.  Are delivered/ picked up in syringes and not auto injectable pens    Semaglutide (Ozempic 0.5mg $200 a month for 4 Doses, 1mg $250 for 4 doses, 2mg  $350 for 4 doses     Tirzepatide (Mounjaro) 2.5 mg $150 for 4 doses, 5mg $225 for 4 doses, 7.5mg $275 for 4 doses. 10mg $350 for 4 doses , 15mg $450 for 4 doses

## 2024-03-05 NOTE — PROGRESS NOTES
3/5/2024    Chief Complaint   Patient presents with    Hypertension    Anxiety    Nausea    Cough     This started after using her CPAP machine    Leg Pain     Right leg still hurting but now it is worse with tingling, numbness and burning and it hurts worse if she coughs or sneezes and pressing on the gas pedal     Edema     Hands and legs are still swelling really bad     Fever       Leatha Campos is a 54 y.o. female, presents today for:      ASSESSMENT/PLAN:    1. Bronchitis  Concern for viral vs bacterial infection but due to recent CPAP use will give Azithromycin today.  Start Mucinex DM.  Discussed nasal spray use to help with symptoms  - azithromycin (ZITHROMAX) 250 MG tablet; 500mg on day 1 followed by 250mg on days 2 - 5  Dispense: 6 tablet; Refill: 0  - Dextromethorphan-guaiFENesin (MUCINEX DM)  MG TB12; Take 1 tablet by mouth 2 times daily as needed (cough)  Dispense: 28 tablet; Refill: 0    2. Essential hypertension  Controlled today  Diet controlled  Continue diet/ exercise changes    3. Chronic bilateral low back pain with bilateral sciatica  Worse today  Encouraged HEP  Continue Lyrica (also for tinnitus).   Encouraged updated appt with Ortho Spine    4. Severe obstructive sleep apnea  Recent initiation of CPAP, continue care with Pulmonary    5. Bilateral hand swelling  Monitor for now, hopeful symptoms will improve with CPAP    6. Chronic hand pain, left  Continue care with Ortho Hand       Return in about 3 months (around 6/5/2024).    Presenting today for chronic disease follow up.      Has now started CPAP for shortness of breath and hand swelling.  Noted she began to have head congestion, rhinorrhea, sore throat.      Also reporting worsening right sided back pain.  Constantly hot \"like holding heating pad on it\".  Constant shooting pain.  Feels similar to RLS symptoms.  Pain stops at knee and has moved medially.  Noted when she drives she will use the thigh muscles which are very sore.  Implemented All Universal Safety Interventions:  [place] to call system. Call bell, personal items and telephone within reach. Instruct patient to call for assistance. Room bathroom lighting operational. Non-slip footwear when patient is off stretcher. Physically safe environment: no spills, clutter or unnecessary equipment. Stretcher in lowest position, wheels locked, appropriate side rails in place.

## 2024-03-08 ENCOUNTER — PATIENT MESSAGE (OUTPATIENT)
Dept: FAMILY MEDICINE CLINIC | Age: 54
End: 2024-03-08

## 2024-03-08 NOTE — TELEPHONE ENCOUNTER
Miranda please see patient MyChart message.  Are there suggestions you have for her to help with her?  Thanks

## 2024-03-08 NOTE — TELEPHONE ENCOUNTER
From: Leatha Campos  To: Tori Barrios  Sent: 3/8/2024 9:58 AM EST  Subject: Sickness    Goodmortresa Valle. I'm really sick I couldn't use my c-pap last night cause now my head is all stuffed up. I have the nasal mask. What do you recommend for that or what should I do? When I talk it takes all the breathe I can just to get it out. I have to stop midway to finish it out what I have to say. I worried about the insurance cause I can't wear that nasal c-pap right now.

## 2024-03-11 ASSESSMENT — ENCOUNTER SYMPTOMS
WHEEZING: 0
ABDOMINAL PAIN: 0
RHINORRHEA: 0
VOMITING: 0
BACK PAIN: 1
SHORTNESS OF BREATH: 1
SORE THROAT: 0

## 2024-03-12 ENCOUNTER — TELEPHONE (OUTPATIENT)
Dept: FAMILY MEDICINE CLINIC | Age: 54
End: 2024-03-12

## 2024-03-12 DIAGNOSIS — M54.41 CHRONIC BILATERAL LOW BACK PAIN WITH BILATERAL SCIATICA: Primary | ICD-10-CM

## 2024-03-12 DIAGNOSIS — M54.42 CHRONIC BILATERAL LOW BACK PAIN WITH BILATERAL SCIATICA: Primary | ICD-10-CM

## 2024-03-12 DIAGNOSIS — G89.29 CHRONIC BILATERAL LOW BACK PAIN WITH BILATERAL SCIATICA: Primary | ICD-10-CM

## 2024-03-12 NOTE — TELEPHONE ENCOUNTER
Pt does need a referral for [facility].  [phone].  Also,they would like for you to order an MRI or CT scan, then the patient can schedule.   Pls advise.

## 2024-03-25 ENCOUNTER — TELEMEDICINE (OUTPATIENT)
Dept: FAMILY MEDICINE CLINIC | Age: 54
End: 2024-03-25
Payer: MEDICAID

## 2024-03-25 ENCOUNTER — TELEPHONE (OUTPATIENT)
Dept: FAMILY MEDICINE CLINIC | Age: 54
End: 2024-03-25

## 2024-03-25 DIAGNOSIS — J45.909 UNCOMPLICATED ASTHMA, UNSPECIFIED ASTHMA SEVERITY, UNSPECIFIED WHETHER PERSISTENT: Chronic | ICD-10-CM

## 2024-03-25 DIAGNOSIS — U07.1 COVID: Primary | ICD-10-CM

## 2024-03-25 DIAGNOSIS — G47.33 SEVERE OBSTRUCTIVE SLEEP APNEA: ICD-10-CM

## 2024-03-25 PROCEDURE — 99213 OFFICE O/P EST LOW 20 MIN: CPT | Performed by: NURSE PRACTITIONER

## 2024-03-25 RX ORDER — LEVALBUTEROL TARTRATE 45 UG/1
1 AEROSOL, METERED ORAL EVERY 4 HOURS PRN
Qty: 1 EACH | Refills: 3 | Status: SHIPPED | OUTPATIENT
Start: 2024-03-25 | End: 2025-03-25

## 2024-03-25 RX ORDER — ONDANSETRON 4 MG/1
4 TABLET, FILM COATED ORAL 3 TIMES DAILY PRN
Qty: 30 TABLET | Refills: 0 | Status: SHIPPED | OUTPATIENT
Start: 2024-03-25

## 2024-03-25 RX ORDER — METHYLPREDNISOLONE 4 MG/1
TABLET ORAL
Qty: 1 KIT | Refills: 0 | Status: SHIPPED | OUTPATIENT
Start: 2024-03-25 | End: 2024-03-31

## 2024-03-25 NOTE — PROGRESS NOTES
Leatha Campos, was evaluated through a synchronous (real-time) audio-video encounter. The patient (or guardian if applicable) is aware that this is a billable service, which includes applicable co-pays. This Virtual Visit was conducted with patient's (and/or legal guardian's) consent. Patient identification was verified, and a caregiver was present when appropriate.   The patient was located at Home: [address] [place] [id number]  Provider was located at Facility (Appt Dept): [address] [place], [place] [id number]      Leatha Campos (:  1970) is a Established patient, presenting virtually for evaluation of the following:    Assessment & Plan   Below is the assessment and plan developed based on review of pertinent history, physical exam, labs, studies, and medications.  1. COVID  Start Paxlovid, discussed risks, benefits, side effects.  Start Prednisone due to concern for possible asthma exacerbation.  Start Xopenex inhaler- does not have abortive inhaler.  Previously attempted Albuterol but developed palpitations.  Consider nebulizer prescription if inhaler doesn't help enough.    Encouraged OTC cough/ cold medication, Tylenol/ Ibuprofen PRN pain  Discussed non pharmacologic measures: Encouraged frequent water intake.  Encouraged Gatorade/ Powerade if not drinking fluids, intake of honey/ lemon tea for sore throat, Humidifier at night for cough, hot showers, Netti Pot if needed for congestion.    If no improvement, encouraged pt to call for F/U labs/ imaging    -     nirmatrelvir/ritonavir 300/100 (PAXLOVID, 300/100,) 20 x 150 MG & 10 x 100MG TBPK; Take 3 tablets (two 150 mg nirmatrelvir and one 100 mg ritonavir tablets) by mouth every 12 hours for 5 days., Disp-30 tablet, R-0Normal  -     levalbuterol (XOPENEX HFA) 45 MCG/ACT inhaler; Inhale 1 puff into the lungs every 4 hours as needed for Wheezing or Shortness of Breath, Disp-1 each, R-3Normal  -     ondansetron (ZOFRAN) 4 MG tablet; Take 1 tablet

## 2024-03-26 NOTE — TELEPHONE ENCOUNTER
Submitted PA for Levalbuterol Tartrate 45MCG/ACT aerosol  Via CMM Key: [id number] STATUS: PENDING.    Follow up done daily; if no decision with in three days we will refax.  If another three days goes by with no decision will call the insurance for status.

## 2024-03-27 NOTE — TELEPHONE ENCOUNTER
APPROVAL for Levalbuterol Tartrate 45MCG/ACT aerosol 03/26/24-03/25/25; letter attached.    If this requires a response please respond to the pool ( P [facility] MEDICATION PRE-AUTH).      Thank you please advise patient.

## 2024-04-08 ENCOUNTER — TELEPHONE (OUTPATIENT)
Age: 54
End: 2024-04-08

## 2024-04-11 ENCOUNTER — HOSPITAL ENCOUNTER (OUTPATIENT)
Dept: MRI IMAGING | Age: 54
Discharge: HOME | End: 2024-04-11
Payer: MEDICAID

## 2024-04-11 ENCOUNTER — TELEPHONE (OUTPATIENT)
Dept: PULMONOLOGY | Age: 54
End: 2024-04-11

## 2024-04-11 DIAGNOSIS — M54.42 CHRONIC BILATERAL LOW BACK PAIN WITH BILATERAL SCIATICA: ICD-10-CM

## 2024-04-11 DIAGNOSIS — M54.41 CHRONIC BILATERAL LOW BACK PAIN WITH BILATERAL SCIATICA: ICD-10-CM

## 2024-04-11 DIAGNOSIS — G89.29 CHRONIC BILATERAL LOW BACK PAIN WITH BILATERAL SCIATICA: ICD-10-CM

## 2024-04-11 PROCEDURE — 72148 MRI LUMBAR SPINE W/O DYE: CPT

## 2024-04-11 NOTE — TELEPHONE ENCOUNTER
Pt currently having MRI. Unable to call.     Need to see if pt has been set up w/ CPAP for appt on 04-19-24.     Please r/s if pt has not rec'd machine or has not had for 31-90 day.

## 2024-04-15 ENCOUNTER — OFFICE VISIT (OUTPATIENT)
Dept: ORTHOPEDIC SURGERY | Age: 54
End: 2024-04-15

## 2024-04-15 VITALS — HEIGHT: 59 IN | BODY MASS INDEX: 42.54 KG/M2 | WEIGHT: 211 LBS

## 2024-04-15 DIAGNOSIS — M79.642 LEFT HAND PAIN: Primary | ICD-10-CM

## 2024-04-15 RX ORDER — LIDOCAINE HYDROCHLORIDE 10 MG/ML
0.5 INJECTION, SOLUTION INFILTRATION; PERINEURAL ONCE
Status: COMPLETED | OUTPATIENT
Start: 2024-04-15 | End: 2024-04-15

## 2024-04-15 RX ORDER — BETAMETHASONE SODIUM PHOSPHATE AND BETAMETHASONE ACETATE 3; 3 MG/ML; MG/ML
3 INJECTION, SUSPENSION INTRA-ARTICULAR; INTRALESIONAL; INTRAMUSCULAR; SOFT TISSUE ONCE
Status: COMPLETED | OUTPATIENT
Start: 2024-04-15 | End: 2024-04-15

## 2024-04-15 RX ORDER — MELOXICAM 15 MG/1
15 TABLET ORAL DAILY
Qty: 30 TABLET | Refills: 3 | Status: SHIPPED | OUTPATIENT
Start: 2024-04-15

## 2024-04-15 RX ADMIN — LIDOCAINE HYDROCHLORIDE 0.5 ML: 10 INJECTION, SOLUTION INFILTRATION; PERINEURAL at 10:41

## 2024-04-15 RX ADMIN — BETAMETHASONE SODIUM PHOSPHATE AND BETAMETHASONE ACETATE 3 MG: 3; 3 INJECTION, SUSPENSION INTRA-ARTICULAR; INTRALESIONAL; INTRAMUSCULAR; SOFT TISSUE at 10:40

## 2024-04-15 NOTE — PROGRESS NOTES
specialization in brace application while under the direct supervision of the treating physician.  Verbal and written instructions for the use of and application of this item were provided.   They were instructed to contact the office immediately should the brace result in increased pain, decreased sensation, increased swelling or worsening of the condition.

## 2024-04-21 DIAGNOSIS — G89.29 CHRONIC BILATERAL LOW BACK PAIN WITH BILATERAL SCIATICA: ICD-10-CM

## 2024-04-21 DIAGNOSIS — M54.41 CHRONIC BILATERAL LOW BACK PAIN WITH BILATERAL SCIATICA: ICD-10-CM

## 2024-04-21 DIAGNOSIS — M54.42 CHRONIC BILATERAL LOW BACK PAIN WITH BILATERAL SCIATICA: ICD-10-CM

## 2024-04-22 RX ORDER — PREGABALIN 25 MG/1
25 CAPSULE ORAL 3 TIMES DAILY
Qty: 270 CAPSULE | Refills: 2 | OUTPATIENT
Start: 2024-04-22 | End: 2025-01-17

## 2024-04-22 RX ORDER — PREGABALIN 25 MG/1
25 CAPSULE ORAL 3 TIMES DAILY
Qty: 90 CAPSULE | Refills: 0 | Status: SHIPPED | OUTPATIENT
Start: 2024-04-22 | End: 2024-05-22

## 2024-04-22 NOTE — TELEPHONE ENCOUNTER
[phone] (Mobile  called pt and scheduled next follow up appt , also this looks like a duplicate request as Rx was already sent in       Future appt scheduled 06/06/2024             Last appt 03/05/2024      Last Written 04/22/2024    pregabalin (LYRICA) 25 MG capsule   #90  0 RF

## 2024-04-29 DIAGNOSIS — M54.42 CHRONIC BILATERAL LOW BACK PAIN WITH BILATERAL SCIATICA: ICD-10-CM

## 2024-04-29 DIAGNOSIS — M54.41 CHRONIC BILATERAL LOW BACK PAIN WITH BILATERAL SCIATICA: ICD-10-CM

## 2024-04-29 DIAGNOSIS — G89.29 CHRONIC BILATERAL LOW BACK PAIN WITH BILATERAL SCIATICA: ICD-10-CM

## 2024-04-30 RX ORDER — PREGABALIN 25 MG/1
25 CAPSULE ORAL 3 TIMES DAILY
Qty: 270 CAPSULE | Refills: 0 | Status: SHIPPED | OUTPATIENT
Start: 2024-04-30 | End: 2024-07-29

## 2024-05-01 ENCOUNTER — TELEPHONE (OUTPATIENT)
Dept: PULMONOLOGY | Age: 54
End: 2024-05-01

## 2024-05-01 ENCOUNTER — OFFICE VISIT (OUTPATIENT)
Dept: PULMONOLOGY | Age: 54
End: 2024-05-01
Payer: MEDICAID

## 2024-05-01 VITALS
DIASTOLIC BLOOD PRESSURE: 82 MMHG | HEART RATE: 54 BPM | OXYGEN SATURATION: 98 % | SYSTOLIC BLOOD PRESSURE: 122 MMHG | BODY MASS INDEX: 45.13 KG/M2 | WEIGHT: 215 LBS | HEIGHT: 58 IN

## 2024-05-01 DIAGNOSIS — G47.33 SEVERE OBSTRUCTIVE SLEEP APNEA: Primary | ICD-10-CM

## 2024-05-01 DIAGNOSIS — R40.0 DAYTIME SLEEPINESS: ICD-10-CM

## 2024-05-01 DIAGNOSIS — Z78.9 INTOLERANCE OF CONTINUOUS POSITIVE AIRWAY PRESSURE (CPAP) VENTILATION: ICD-10-CM

## 2024-05-01 DIAGNOSIS — J45.909 UNCOMPLICATED ASTHMA, UNSPECIFIED ASTHMA SEVERITY, UNSPECIFIED WHETHER PERSISTENT: ICD-10-CM

## 2024-05-01 PROCEDURE — 3079F DIAST BP 80-89 MM HG: CPT

## 2024-05-01 PROCEDURE — 99214 OFFICE O/P EST MOD 30 MIN: CPT

## 2024-05-01 PROCEDURE — 3074F SYST BP LT 130 MM HG: CPT

## 2024-05-01 RX ORDER — ZOLPIDEM TARTRATE 5 MG/1
5 TABLET ORAL NIGHTLY PRN
Qty: 45 TABLET | Refills: 0 | Status: SHIPPED | OUTPATIENT
Start: 2024-05-01 | End: 2024-06-15

## 2024-05-01 RX ORDER — ALBUTEROL SULFATE 2.5 MG/3ML
2.5 SOLUTION RESPIRATORY (INHALATION) EVERY 4 HOURS PRN
Qty: 1080 ML | Refills: 2 | Status: SHIPPED | OUTPATIENT
Start: 2024-05-01 | End: 2025-05-01

## 2024-05-01 ASSESSMENT — SLEEP AND FATIGUE QUESTIONNAIRES
HOW LIKELY ARE YOU TO NOD OFF OR FALL ASLEEP WHILE SITTING AND TALKING TO SOMEONE: SLIGHT CHANCE OF DOZING
HOW LIKELY ARE YOU TO NOD OFF OR FALL ASLEEP WHILE SITTING QUIETLY AFTER LUNCH WITHOUT ALCOHOL: MODERATE CHANCE OF DOZING
HOW LIKELY ARE YOU TO NOD OFF OR FALL ASLEEP WHILE SITTING INACTIVE IN A PUBLIC PLACE: HIGH CHANCE OF DOZING
NECK CIRCUMFERENCE (INCHES): 14
HOW LIKELY ARE YOU TO NOD OFF OR FALL ASLEEP IN A CAR, WHILE STOPPED FOR A FEW MINUTES IN TRAFFIC: MODERATE CHANCE OF DOZING
HOW LIKELY ARE YOU TO NOD OFF OR FALL ASLEEP WHILE WATCHING TV: HIGH CHANCE OF DOZING
ESS TOTAL SCORE: 19
HOW LIKELY ARE YOU TO NOD OFF OR FALL ASLEEP WHILE LYING DOWN TO REST IN THE AFTERNOON WHEN CIRCUMSTANCES PERMIT: HIGH CHANCE OF DOZING
HOW LIKELY ARE YOU TO NOD OFF OR FALL ASLEEP WHILE SITTING AND READING: HIGH CHANCE OF DOZING
HOW LIKELY ARE YOU TO NOD OFF OR FALL ASLEEP WHEN YOU ARE A PASSENGER IN A CAR FOR AN HOUR WITHOUT A BREAK: MODERATE CHANCE OF DOZING

## 2024-05-01 NOTE — PATIENT INSTRUCTIONS
Attention:   Effective June 1, 2024, I will be leaving pulmonary and critical care medicine and no longer be seeing patients in [facility]'s pulmonary office.    I will be transitioning to a new role, focusing exclusively on sleep medicine and will have new offices at [facility] & [facility].    My new primary office will still be in this building -- [address] at [facility] [address] (shared with [facility] specialists).    My new office phone number will be [phone].         We will change Spiriva to the higher dose.  Continue to use this with Advair.  Continue to use albuterol on an as-needed basis.  We will place orders for a nebulizer & albuterol nebs.     I will increase your CPAP pressures to hopefully be more comfortable for you.  Please let me know if these changes need further adjusting.    You can adjust your tube temperature down to try to avoid the hot air feeling.  If you start having water condensation in your tubing then will either have to increase the tube temp back up OR turn the climate control from auto to manual.  Then you would adjust the humidity -- start at 4 and adjust up or down from there, balancing the humidity intensity with the tube temperature.     If you start doing any sort of complex sleep behaviors (sleep talking, sleep walking, sleep texting, sleep eating, etc.) while on Ambien, you must stop it immediately and notify me.       CPAP Titration instructions:   First, you will schedule the study with the sleep lab (call them at [phone] if you do not receive their call.)  During this study, the CPAP settings will get customized to you.  See further details about the titration beow.   Once we determine what type of machine and settings work for you, I will update prescription accordingly.        It was great to see you again and take care Leatha!  -Miranda      ______________________________________________________________________  Never

## 2024-05-01 NOTE — TELEPHONE ENCOUNTER
Faxed pressure change order, CR, and ov notes to [facility] at [phone] via RightFax.    Routed pap titration order to Tayler Murphy.

## 2024-05-01 NOTE — PROGRESS NOTES
PULMONARY, CRITICAL CARE AND SLEEP MEDICINE   Outpatient Sleep progress Note  CC: \"I'm dozing off more\"   Referring Provider: Tori Barrios CNP     Interval History 5/1/24:  31-90  -  Set up with AirSense 11 2/29/24.  Patient is tolerating CPAP very poorly per her report.  She is using a FFM and feeling very claustrophobic.  She also dislikes the warm air, has heated tubing.  Struggling with some dry mouth as well.  She tried a nasal mask and felt she wasn't able to position it correctly and wasn't getting enough air.  However, when she was able to tolerate CPAP, she felt tremendous benefit the next day and full of energy.  She did try a sleep aid one night and was able to tolerate it a little better.  She also used to be on a nightly benzodiazepine to tolerate the anxiety from CPAP nightly, however, there is not a plan to refill this.  She asked about Inspire as an option but did not realize it was an implant.   -  BREANN treated with benefit with APAP 5-15; used 4:43 hrs avg with compliance >4 hour use 21/30 (used 28) days, residual AHI 0.4.  Pressures: median 9.5, 95th% 12.8, max 13.8.  ESS: 19.       Interval History 2/8/24:  f/u HST  -  Really liked Trelegy but insurance denied; I sent Advair + Spiriva instead.  Feels the inhalers are only some helpful.   -  Had HST 1/25/24 demonstrating severe BREANN with AHI 32.  ESS is: 17.  She is receptive to CPAP and hopeful to feel better.     Presenting HPI 1/10/24:  55 yo female with a 1.5 year history of severe excessive daytime sleepiness and fatigue, associated with severe snoring, worse with 50 lb weight gain over 3 yrs, & observed apneas witnessed by her grandkids who make comments.  + choking or gasping during sleep.  + severe AM headaches.  No treatments tried so far & has not been evaluated for sleep apnea in the past.  Routine is relatively inconsistent. Gets ~ 4-6 hrs of sleep per night, shifting anywhere from not being able to fall asleep at all, to sleeping all

## 2024-05-06 DIAGNOSIS — F41.1 GAD (GENERALIZED ANXIETY DISORDER): ICD-10-CM

## 2024-05-06 DIAGNOSIS — M54.41 CHRONIC BILATERAL LOW BACK PAIN WITH BILATERAL SCIATICA: ICD-10-CM

## 2024-05-06 DIAGNOSIS — M54.42 CHRONIC BILATERAL LOW BACK PAIN WITH BILATERAL SCIATICA: ICD-10-CM

## 2024-05-06 DIAGNOSIS — G89.29 CHRONIC BILATERAL LOW BACK PAIN WITH BILATERAL SCIATICA: ICD-10-CM

## 2024-05-06 DIAGNOSIS — F33.2 SEVERE EPISODE OF RECURRENT MAJOR DEPRESSIVE DISORDER, WITHOUT PSYCHOTIC FEATURES (HCC): ICD-10-CM

## 2024-05-06 RX ORDER — AMITRIPTYLINE HYDROCHLORIDE 25 MG/1
25 TABLET, FILM COATED ORAL NIGHTLY
Qty: 90 TABLET | Refills: 0 | Status: SHIPPED | OUTPATIENT
Start: 2024-05-06 | End: 2024-05-10

## 2024-05-07 ENCOUNTER — OFFICE VISIT (OUTPATIENT)
Age: 54
End: 2024-05-07
Payer: MEDICAID

## 2024-05-07 ENCOUNTER — TELEPHONE (OUTPATIENT)
Age: 54
End: 2024-05-07

## 2024-05-07 VITALS
DIASTOLIC BLOOD PRESSURE: 70 MMHG | HEART RATE: 57 BPM | RESPIRATION RATE: 14 BRPM | HEIGHT: 58 IN | WEIGHT: 215 LBS | OXYGEN SATURATION: 97 % | BODY MASS INDEX: 45.13 KG/M2 | SYSTOLIC BLOOD PRESSURE: 122 MMHG

## 2024-05-07 DIAGNOSIS — H93.13 TINNITUS OF BOTH EARS: ICD-10-CM

## 2024-05-07 DIAGNOSIS — G57.11 MERALGIA PARESTHETICA, RIGHT: ICD-10-CM

## 2024-05-07 DIAGNOSIS — R42 DIZZINESS: Primary | ICD-10-CM

## 2024-05-07 PROCEDURE — 3078F DIAST BP <80 MM HG: CPT | Performed by: PSYCHIATRY & NEUROLOGY

## 2024-05-07 PROCEDURE — 99204 OFFICE O/P NEW MOD 45 MIN: CPT | Performed by: PSYCHIATRY & NEUROLOGY

## 2024-05-07 PROCEDURE — 3074F SYST BP LT 130 MM HG: CPT | Performed by: PSYCHIATRY & NEUROLOGY

## 2024-05-07 NOTE — TELEPHONE ENCOUNTER
Pt had NP consult with Dr. Tomlin today and he ordered carotid doppler which pt plans to complete at [facility].  I will fax order over to them today.  I asked pt to call us to let us know when she has it scheduled so we can f/u on results.  Per Dr. Tomlin - results will determine whether she needs Neuro f/u here.

## 2024-05-07 NOTE — PROGRESS NOTES
Neurology outpatient new visit    Patient name: Leatha Campos      Chief Complaint:  Recurrent spell with dizziness.    History of present illness:  This is a 54 years old right-handed female.  The patient is here for evaluation of recurrent spell with dizziness.  The onset was several years ago.  The course is slowly progressive.  The patient also reports bilateral tinnitus and loss of hearing on both ears.  The patient described the spell as sudden onset of spinning sensation and off-balance.  This spell is getting worse with head movement.  It usually last less than seconds.  The patient denies focal weakness, numbness or speech difficulty with the spell.  The patient was followed by ENT in the past.    The patient also complains burning sensation and pain over the right lateral thigh at this time.  The patient is currently on pregabalin.  The patient also reports significant weight gain from pregabalin.    Past medical history:    Past Medical History:   Diagnosis Date    Anxiety     Chronic back pain     Cyst (solitary) of breast     RIGHT BREAST    Depression     Hypertension        Past surgical history:    Past Surgical History:   Procedure Laterality Date    BACK SURGERY  11/30/2016    L3/L4, L4/5 due to injury at work, hx STNA, s/p lumbar cage    CARPAL TUNNEL RELEASE Right     2012    CHOLECYSTECTOMY  2005    EYE SURGERY  1983    right eye tumors    HYSTERECTOMY (CERVIX STATUS UNKNOWN)  2000    TUBAL LIGATION          Medication:    Current Outpatient Medications   Medication Sig Dispense Refill    amitriptyline (ELAVIL) 25 MG tablet Take 1 tablet by mouth nightly 90 tablet 0    albuterol (PROVENTIL) (2.5 MG/3ML) 0.083% nebulizer solution Take 3 mLs by nebulization every 4 hours as needed for Wheezing or Shortness of Breath 1080 mL 2    zolpidem (AMBIEN) 5 MG tablet Take 1 tablet by mouth nightly as needed for Sleep (to tolerate CPAP) for up to 45 days. 45 tablet 0    tiotropium (SPIRIVA RESPIMAT) 2.5

## 2024-05-07 NOTE — PATIENT INSTRUCTIONS

## 2024-05-07 NOTE — TELEPHONE ENCOUNTER
Doppler order faxed to [facility] Scheduling Dept at [phone].  Will wait to hear from pt as to when she gets the test scheduled.

## 2024-05-08 NOTE — TELEPHONE ENCOUNTER
Makeda with [facility] called to requested LOV so they can submit if doppler requires auth. Notes faxed as requested [phone]

## 2024-05-10 ENCOUNTER — TELEMEDICINE (OUTPATIENT)
Dept: FAMILY MEDICINE CLINIC | Age: 54
End: 2024-05-10
Payer: MEDICAID

## 2024-05-10 DIAGNOSIS — G89.29 CHRONIC BILATERAL LOW BACK PAIN WITH BILATERAL SCIATICA: ICD-10-CM

## 2024-05-10 DIAGNOSIS — M51.37 DDD (DEGENERATIVE DISC DISEASE), LUMBOSACRAL: ICD-10-CM

## 2024-05-10 DIAGNOSIS — G47.33 SEVERE OBSTRUCTIVE SLEEP APNEA: ICD-10-CM

## 2024-05-10 DIAGNOSIS — M54.42 CHRONIC BILATERAL LOW BACK PAIN WITH BILATERAL SCIATICA: ICD-10-CM

## 2024-05-10 DIAGNOSIS — H93.13 TINNITUS OF BOTH EARS: ICD-10-CM

## 2024-05-10 DIAGNOSIS — M54.41 CHRONIC BILATERAL LOW BACK PAIN WITH BILATERAL SCIATICA: ICD-10-CM

## 2024-05-10 DIAGNOSIS — F41.8 DEPRESSION WITH ANXIETY: Chronic | ICD-10-CM

## 2024-05-10 DIAGNOSIS — F41.0 GENERALIZED ANXIETY DISORDER WITH PANIC ATTACKS: Chronic | ICD-10-CM

## 2024-05-10 DIAGNOSIS — E66.01 CLASS 3 SEVERE OBESITY DUE TO EXCESS CALORIES WITH SERIOUS COMORBIDITY AND BODY MASS INDEX (BMI) OF 40.0 TO 44.9 IN ADULT (HCC): Primary | ICD-10-CM

## 2024-05-10 DIAGNOSIS — F41.1 GAD (GENERALIZED ANXIETY DISORDER): ICD-10-CM

## 2024-05-10 DIAGNOSIS — F33.2 SEVERE EPISODE OF RECURRENT MAJOR DEPRESSIVE DISORDER, WITHOUT PSYCHOTIC FEATURES (HCC): ICD-10-CM

## 2024-05-10 DIAGNOSIS — F41.1 GENERALIZED ANXIETY DISORDER WITH PANIC ATTACKS: Chronic | ICD-10-CM

## 2024-05-10 PROCEDURE — 99214 OFFICE O/P EST MOD 30 MIN: CPT | Performed by: NURSE PRACTITIONER

## 2024-05-10 RX ORDER — AMITRIPTYLINE HYDROCHLORIDE 25 MG/1
12.5 TABLET, FILM COATED ORAL NIGHTLY
Qty: 45 TABLET | Refills: 0
Start: 2024-05-10

## 2024-05-10 RX ORDER — PREGABALIN 25 MG/1
25 CAPSULE ORAL 2 TIMES DAILY
Qty: 180 CAPSULE | Refills: 0
Start: 2024-05-10 | End: 2024-08-08

## 2024-05-10 SDOH — ECONOMIC STABILITY: INCOME INSECURITY: HOW HARD IS IT FOR YOU TO PAY FOR THE VERY BASICS LIKE FOOD, HOUSING, MEDICAL CARE, AND HEATING?: NOT HARD AT ALL

## 2024-05-10 SDOH — ECONOMIC STABILITY: FOOD INSECURITY: WITHIN THE PAST 12 MONTHS, YOU WORRIED THAT YOUR FOOD WOULD RUN OUT BEFORE YOU GOT MONEY TO BUY MORE.: NEVER TRUE

## 2024-05-10 SDOH — ECONOMIC STABILITY: FOOD INSECURITY: WITHIN THE PAST 12 MONTHS, THE FOOD YOU BOUGHT JUST DIDN'T LAST AND YOU DIDN'T HAVE MONEY TO GET MORE.: NEVER TRUE

## 2024-05-10 ASSESSMENT — ENCOUNTER SYMPTOMS
DIARRHEA: 0
SHORTNESS OF BREATH: 0
COUGH: 0
BLOOD IN STOOL: 0
CONSTIPATION: 0
CHEST TIGHTNESS: 0

## 2024-05-10 NOTE — PROGRESS NOTES
see Weight Management provider regarding treatment.  Understands medication options may be limited due to insurance.        Review of Systems   Constitutional: Negative.    Respiratory:  Negative for cough, chest tightness and shortness of breath.    Cardiovascular: Negative.    Gastrointestinal:  Negative for blood in stool, constipation and diarrhea.   Skin: Negative.    Neurological:  Negative for dizziness, tremors, light-headedness and headaches.   Psychiatric/Behavioral:  Positive for decreased concentration, dysphoric mood and sleep disturbance. Negative for suicidal ideas. The patient is nervous/anxious.           Objective   Patient-Reported Vitals  No data recorded     Physical Exam  Vitals reviewed.   Constitutional:       Appearance: Normal appearance.   HENT:      Head: Normocephalic.   Neck:      Vascular: No carotid bruit.   Cardiovascular:      Rate and Rhythm: Normal rate and regular rhythm.      Pulses: Normal pulses.           Carotid pulses are 2+ on the right side and 2+ on the left side.       Dorsalis pedis pulses are 2+ on the right side and 2+ on the left side.        Posterior tibial pulses are 2+ on the right side and 2+ on the left side.      Heart sounds: Normal heart sounds. No murmur heard.     No gallop.   Pulmonary:      Effort: Pulmonary effort is normal.      Breath sounds: Normal breath sounds.   Abdominal:      General: Abdomen is flat.      Palpations: Abdomen is soft.   Musculoskeletal:         General: Normal range of motion.      Cervical back: Normal range of motion.      Right lower leg: No edema.      Left lower leg: No edema.   Lymphadenopathy:      Cervical: No cervical adenopathy.   Skin:     General: Skin is warm and dry.      Capillary Refill: Capillary refill takes less than 2 seconds.   Neurological:      General: No focal deficit present.      Mental Status: She is alert and oriented to person, place, and time.   Psychiatric:         Mood and Affect: Mood normal.

## 2024-05-14 NOTE — TELEPHONE ENCOUNTER
LM for [facility] scheduling dept asking if pt has been scheduled for carotid doppler yet.

## 2024-05-15 ENCOUNTER — TELEPHONE (OUTPATIENT)
Dept: PULMONOLOGY | Age: 54
End: 2024-05-15

## 2024-05-15 NOTE — TELEPHONE ENCOUNTER
Nasreen WANG with [place] [employer] called asking if the sleep study auth is submitted for a sleep study or titration.  Advised that it is for titration.  Nasreen asked if there is a reason this can't be done in the home, advised that titration studies are not done in the home: at pt follow up pt wasn't tolerating CPAP well so that is why the titration is being ordered. Nasreen is submitting the prior auth request and response will be faxed to our office.

## 2024-05-17 ENCOUNTER — HOSPITAL ENCOUNTER (OUTPATIENT)
Age: 54
End: 2024-05-17
Attending: PSYCHIATRY & NEUROLOGY
Payer: MEDICAID

## 2024-05-17 DIAGNOSIS — R42 DIZZINESS: ICD-10-CM

## 2024-05-17 LAB
VAS LEFT ARM BP: 182 MMHG
VAS LEFT CCA DIST EDV: 32.9 CM/S
VAS LEFT CCA DIST PSV: 83.9 CM/S
VAS LEFT CCA MID EDV: 31.1 CM/S
VAS LEFT CCA MID PSV: 92.6 CM/S
VAS LEFT CCA PROX EDV: 35.4 CM/S
VAS LEFT CCA PROX PSV: 98.8 CM/S
VAS LEFT ECA PSV: 76.4 CM/S
VAS LEFT ICA DIST EDV: 39.2 CM/S
VAS LEFT ICA DIST PSV: 114 CM/S
VAS LEFT ICA MID EDV: 44.7 CM/S
VAS LEFT ICA MID PSV: 107 CM/S
VAS LEFT ICA PROX EDV: 16.8 CM/S
VAS LEFT ICA PROX PSV: 59 CM/S
VAS LEFT ICA/CCA PSV: 1.23
VAS LEFT SUBCLAVIAN PROX PSV: 150 CM/S
VAS LEFT VERTEBRAL EDV: 29.8 CM/S
VAS LEFT VERTEBRAL PSV: 72.1 CM/S
VAS RIGHT ARM BP: 182 MMHG
VAS RIGHT CCA DIST EDV: 18.6 CM/S
VAS RIGHT CCA DIST PSV: 61.5 CM/S
VAS RIGHT CCA MID EDV: 22.4 CM/S
VAS RIGHT CCA MID PSV: 77 CM/S
VAS RIGHT CCA PROX EDV: 26.1 CM/S
VAS RIGHT CCA PROX PSV: 96.3 CM/S
VAS RIGHT ECA PSV: 88.2 CM/S
VAS RIGHT ICA DIST EDV: 52 CM/S
VAS RIGHT ICA DIST PSV: 123 CM/S
VAS RIGHT ICA MID EDV: 33.5 CM/S
VAS RIGHT ICA MID PSV: 84.5 CM/S
VAS RIGHT ICA PROX EDV: 18.6 CM/S
VAS RIGHT ICA PROX PSV: 56.2 CM/S
VAS RIGHT ICA/CCA PSV: 1.6
VAS RIGHT SUBCLAVIAN PROX PSV: 127 CM/S
VAS RIGHT VERTEBRAL EDV: 24.3 CM/S
VAS RIGHT VERTEBRAL PSV: 69.3 CM/S

## 2024-05-17 PROCEDURE — 93880 EXTRACRANIAL BILAT STUDY: CPT | Performed by: INTERNAL MEDICINE

## 2024-05-17 PROCEDURE — 93880 EXTRACRANIAL BILAT STUDY: CPT

## 2024-05-20 NOTE — TELEPHONE ENCOUNTER
I called [facility] and asked them to fax doppler results from Friday but they said pt hasn't been seen there since 2023.  I LM for pt to CB asking if she rescheduled the test to a different date or if she went some place else to have it done.

## 2024-05-20 NOTE — TELEPHONE ENCOUNTER
Per Dr. Tomlin: It is normal.  She needs appt with ENT.  Referral was already made & pt has appt with Dr. Manning on 6/5/24.

## 2024-05-20 NOTE — TELEPHONE ENCOUNTER
Call placed to patient updated on below. Patient verbalized understanding.     Pt called back and clarified that she had her doppler done at [facility].  Results visible in chart.

## 2024-05-21 ENCOUNTER — OFFICE VISIT (OUTPATIENT)
Dept: FAMILY MEDICINE CLINIC | Age: 54
End: 2024-05-21
Payer: MEDICAID

## 2024-05-21 VITALS — WEIGHT: 215 LBS | BODY MASS INDEX: 44.94 KG/M2 | DIASTOLIC BLOOD PRESSURE: 60 MMHG | SYSTOLIC BLOOD PRESSURE: 112 MMHG

## 2024-05-21 DIAGNOSIS — L03.211 FACIAL CELLULITIS: Primary | ICD-10-CM

## 2024-05-21 DIAGNOSIS — L08.9 FACIAL INFECTION: ICD-10-CM

## 2024-05-21 DIAGNOSIS — H92.02 OTALGIA OF LEFT EAR: ICD-10-CM

## 2024-05-21 PROCEDURE — 3078F DIAST BP <80 MM HG: CPT

## 2024-05-21 PROCEDURE — 99214 OFFICE O/P EST MOD 30 MIN: CPT

## 2024-05-21 PROCEDURE — 3074F SYST BP LT 130 MM HG: CPT

## 2024-05-21 RX ORDER — DOXYCYCLINE HYCLATE 100 MG
100 TABLET ORAL 2 TIMES DAILY
Qty: 14 TABLET | Refills: 0 | Status: SHIPPED | OUTPATIENT
Start: 2024-05-21 | End: 2024-05-28

## 2024-05-21 ASSESSMENT — ENCOUNTER SYMPTOMS
GASTROINTESTINAL NEGATIVE: 1
TROUBLE SWALLOWING: 0
SINUS PRESSURE: 0
VOICE CHANGE: 0

## 2024-05-21 NOTE — PROGRESS NOTES
Numbness of right anterior thigh    Osteoarthritis of spine with radiculopathy, thoracolumbar region    Panic attacks    Spondylolisthesis    Vitamin D deficiency    Tinnitus of both ears    Persistent postural-perceptual dizziness    Severe episode of recurrent major depressive disorder, without psychotic features (HCC)    Generalized anxiety disorder with panic attacks    History of postoperative nausea and vomiting    Witnessed apneic spells    Severe obstructive sleep apnea     Past Surgical History:   Procedure Laterality Date    BACK SURGERY  11/30/2016    L3/L4, L4/5 due to injury at work, hx STNA, s/p lumbar cage    CARPAL TUNNEL RELEASE Right     2012    CHOLECYSTECTOMY  2005    EYE SURGERY  1983    right eye tumors    HYSTERECTOMY (CERVIX STATUS UNKNOWN)  2000    TUBAL LIGATION       Social History     Socioeconomic History    Marital status:      Spouse name: Not on file    Number of children: Not on file    Years of education: Not on file    Highest education level: Not on file   Occupational History    Not on file   Tobacco Use    Smoking status: Former     Current packs/day: 0.00     Types: Cigarettes     Quit date: 3/6/2009     Years since quitting: 15.2    Smokeless tobacco: Never   Vaping Use    Vaping Use: Never used   Substance and Sexual Activity    Alcohol use: No     Comment: 1 beer a month    Drug use: No    Sexual activity: Yes     Partners: Male   Other Topics Concern    Not on file   Social History Narrative    Not on file     Social Determinants of Health     Financial Resource Strain: Low Risk  (5/10/2024)    Overall Financial Resource Strain (CARDIA)     Difficulty of Paying Living Expenses: Not hard at all   Food Insecurity: No Food Insecurity (5/10/2024)    Hunger Vital Sign     Worried About Running Out of Food in the Last Year: Never true     Ran Out of Food in the Last Year: Never true   Transportation Needs: Unknown (5/10/2024)    PRAPARE - Transportation     Lack of

## 2024-05-22 DIAGNOSIS — J45.909 UNCOMPLICATED ASTHMA, UNSPECIFIED ASTHMA SEVERITY, UNSPECIFIED WHETHER PERSISTENT: ICD-10-CM

## 2024-05-22 RX ORDER — FLUTICASONE PROPIONATE AND SALMETEROL 50; 500 UG/1; UG/1
POWDER RESPIRATORY (INHALATION)
Qty: 60 EACH | Refills: 3 | Status: SHIPPED | OUTPATIENT
Start: 2024-05-22

## 2024-06-04 DIAGNOSIS — Z78.9 INTOLERANCE OF CONTINUOUS POSITIVE AIRWAY PRESSURE (CPAP) VENTILATION: ICD-10-CM

## 2024-06-04 DIAGNOSIS — G47.33 SEVERE OBSTRUCTIVE SLEEP APNEA: ICD-10-CM

## 2024-06-04 RX ORDER — ZOLPIDEM TARTRATE 5 MG/1
5 TABLET ORAL NIGHTLY PRN
Qty: 45 TABLET | Refills: 0 | OUTPATIENT
Start: 2024-06-04 | End: 2024-07-19

## 2024-06-04 NOTE — TELEPHONE ENCOUNTER
Reviewed.  Will leave as is, thank you.   Still plan to f/u 1-2 months on new settings after titration

## 2024-06-05 ENCOUNTER — PROCEDURE VISIT (OUTPATIENT)
Dept: AUDIOLOGY | Age: 54
End: 2024-06-05
Payer: MEDICAID

## 2024-06-05 ENCOUNTER — OFFICE VISIT (OUTPATIENT)
Dept: ENT CLINIC | Age: 54
End: 2024-06-05
Payer: MEDICAID

## 2024-06-05 VITALS
HEART RATE: 68 BPM | BODY MASS INDEX: 45.13 KG/M2 | WEIGHT: 215 LBS | HEIGHT: 58 IN | DIASTOLIC BLOOD PRESSURE: 82 MMHG | SYSTOLIC BLOOD PRESSURE: 144 MMHG

## 2024-06-05 DIAGNOSIS — H81.91 PERIPHERAL VESTIBULOPATHY OF RIGHT EAR: Primary | ICD-10-CM

## 2024-06-05 DIAGNOSIS — H90.3 SENSORINEURAL HEARING LOSS, BILATERAL: Primary | ICD-10-CM

## 2024-06-05 DIAGNOSIS — H90.3 SENSORINEURAL HEARING LOSS (SNHL) OF BOTH EARS: ICD-10-CM

## 2024-06-05 DIAGNOSIS — H93.13 TINNITUS OF BOTH EARS: ICD-10-CM

## 2024-06-05 DIAGNOSIS — F41.9 ANXIETY: ICD-10-CM

## 2024-06-05 DIAGNOSIS — R42 DIZZINESS AND GIDDINESS: ICD-10-CM

## 2024-06-05 PROCEDURE — 3079F DIAST BP 80-89 MM HG: CPT | Performed by: OTOLARYNGOLOGY

## 2024-06-05 PROCEDURE — 99214 OFFICE O/P EST MOD 30 MIN: CPT | Performed by: OTOLARYNGOLOGY

## 2024-06-05 PROCEDURE — 92557 COMPREHENSIVE HEARING TEST: CPT | Performed by: AUDIOLOGIST

## 2024-06-05 PROCEDURE — 92567 TYMPANOMETRY: CPT | Performed by: AUDIOLOGIST

## 2024-06-05 PROCEDURE — 3077F SYST BP >= 140 MM HG: CPT | Performed by: OTOLARYNGOLOGY

## 2024-06-05 RX ORDER — FLUOXETINE HYDROCHLORIDE 20 MG/1
20 CAPSULE ORAL DAILY
Qty: 30 CAPSULE | Refills: 3 | Status: SHIPPED | OUTPATIENT
Start: 2024-06-05

## 2024-06-05 ASSESSMENT — ENCOUNTER SYMPTOMS
COUGH: 0
TROUBLE SWALLOWING: 0
FACIAL SWELLING: 0
SORE THROAT: 0
APNEA: 0
SINUS PRESSURE: 0
EYE ITCHING: 0
VOICE CHANGE: 0
SHORTNESS OF BREATH: 0

## 2024-06-05 NOTE — PROGRESS NOTES
Leatha Campos   1970, 54 y.o. female   [id number]       Referring Provider: Celestina Manning DO  Referral Type: In an order in Epic    Reason for Visit: Evaluation of suspected change in hearing, tinnitus, or balance.    ADULT AUDIOLOGIC EVALUATION      Leatha Campos is a 54 y.o. female seen today, 6/5/2024 , for a recheck audiologic evaluation.  Patient was seen by Celestina Manning DO following today's evaluation. Patient was alone.    AUDIOLOGIC AND OTHER PERTINENT MEDICAL HISTORY:      Leatha Campos noted tinnitus and dizziness.  Patient reports constant tinnitus bilaterally. She states she experiences dizziness when bending over or turning to the right side.  Previous audiologic evaluation was completed on 03/27/2023.    Leatha Campos denied otalgia and aural fullness.    Date: 6/5/2024     IMPRESSIONS:      Normal middle ear pressure and compliance, bilaterally.  Slightly abnormal hearing sensitivity, bilaterally, which can coincide with patient's tinnitus.  Word understanding was excellent presented at normal conversation level, bilaterally.  Reliability was considered fair due to inconsistent behavioral threshold levels.  Hearing loss is slightly better than previous audiologic evaluation.  Follow medical recommendations of Celestina Manning DO.     ASSESSMENT AND FINDINGS:     Otoscopy revealed: Clear ear canals bilaterally    RIGHT EAR:  Hearing Sensitivity: Minimal responses obtained in the mild to moderate sensorineural hearing range from 250-8000 Hz  Speech Recognition Threshold: 25 dB HL  Word Recognition:Excellent (90%), based on NU-6 10-word list at 55 dBHL using recorded speech stimuli.    Tympanometry: Normal peak pressure and compliance, Type A tympanogram, consistent with normal middle ear function.      LEFT EAR:  Hearing Sensitivity: Minimal responses obtained in the mild to moderate sensorineural hearing range from 250-8000 Hz  Speech Recognition Threshold: 25 dB HL  Word Recognition: Excellent (90%),

## 2024-06-05 NOTE — PROGRESS NOTES
[facility]  [address], [address]  [place] [address]  P: [phone]       Patient     Leatha Campos  1970    ChiefComplaint     Chief Complaint   Patient presents with    Dizziness     Referred anne [facility]         History of Present Illness     Patient with concern for dizziness. She states that 2 years ago when she bent over and looked up she had spinning vertigo along with \"seeing dots\" that lasted approximately 3-4 minutes, with subsequent dysequilibrium and nausea and the sensation of \"floating\". Feels more stable lying down although the transition to lying from sitting causes dysequilibrium. When she goes around a curve she feels she is still floating. Occasionally feels anxious due to this condition, and has had falls before due to this.   Has history of depression and is on amitriptyline 05/2022 - states that this has helped slightly but when she is angry/anxious she gets significant dysequilibrium.      Denies caffeine use, drinks Gatorade and lemonade only.     Interval History 10/18/2022: Continuing prozac 20mg BID, believes that the \"floating feeling\" has not improved, however the feeling of spinning when she lies down has significantly improved - no longer all the the time, has occurred 1-2x in the past week with decreased intensity. Believes that the tinnitus in her ears initially improved on prozac, but then on 9/12 she had worse tinnitus which is always in the right ear but occasionally bilateral (especially when \"I get worked up\"), with occasional ear fullness. Has been on lyrica for chronic pain which significantly improves symptoms.      Interval History 1/10/2022: On prozac 20mg BID, continues to have bilateral tinnitus. Believes that dizziness is improved 40-50% - worsens with anxiety, quick movements in dark environments. MRI 08/2022 without CPA lesions, VNG 08/2022 with right caloric weakness. Vertigo with one episode last week with ear throbbing,

## 2024-06-06 ENCOUNTER — HOSPITAL ENCOUNTER (OUTPATIENT)
Dept: SLEEP CENTER | Age: 54
Discharge: HOME | End: 2024-06-08
Payer: MEDICAID

## 2024-06-06 DIAGNOSIS — G47.33 SEVERE OBSTRUCTIVE SLEEP APNEA: ICD-10-CM

## 2024-06-06 DIAGNOSIS — J45.909 UNCOMPLICATED ASTHMA, UNSPECIFIED ASTHMA SEVERITY, UNSPECIFIED WHETHER PERSISTENT: ICD-10-CM

## 2024-06-06 DIAGNOSIS — R40.0 DAYTIME SLEEPINESS: ICD-10-CM

## 2024-06-06 PROCEDURE — 95811 POLYSOM 6/>YRS CPAP 4/> PARM: CPT

## 2024-06-10 ENCOUNTER — TELEPHONE (OUTPATIENT)
Age: 54
End: 2024-06-10

## 2024-06-10 DIAGNOSIS — G47.33 SEVERE OBSTRUCTIVE SLEEP APNEA: Primary | ICD-10-CM

## 2024-06-10 DIAGNOSIS — G47.33 SEVERE OBSTRUCTIVE SLEEP APNEA: ICD-10-CM

## 2024-06-10 DIAGNOSIS — G47.33 OSA (OBSTRUCTIVE SLEEP APNEA): Primary | ICD-10-CM

## 2024-06-10 NOTE — TELEPHONE ENCOUNTER
Pt called back and was informed of titration result.  Pt states that she was given a new mask during her sleep study and she wants an order for the N20 nasal mask to be sent to [facility].  Order pending if appropriate.

## 2024-06-12 ENCOUNTER — OFFICE VISIT (OUTPATIENT)
Dept: FAMILY MEDICINE CLINIC | Age: 54
End: 2024-06-12
Payer: MEDICAID

## 2024-06-12 VITALS
HEART RATE: 62 BPM | SYSTOLIC BLOOD PRESSURE: 153 MMHG | OXYGEN SATURATION: 97 % | TEMPERATURE: 97.6 F | WEIGHT: 211 LBS | DIASTOLIC BLOOD PRESSURE: 88 MMHG | BODY MASS INDEX: 44.1 KG/M2

## 2024-06-12 DIAGNOSIS — F41.1 GENERALIZED ANXIETY DISORDER WITH PANIC ATTACKS: ICD-10-CM

## 2024-06-12 DIAGNOSIS — F41.0 GENERALIZED ANXIETY DISORDER WITH PANIC ATTACKS: ICD-10-CM

## 2024-06-12 DIAGNOSIS — H93.13 TINNITUS OF BOTH EARS: ICD-10-CM

## 2024-06-12 DIAGNOSIS — I10 ESSENTIAL HYPERTENSION: Primary | ICD-10-CM

## 2024-06-12 DIAGNOSIS — Z12.31 SCREENING MAMMOGRAM, ENCOUNTER FOR: ICD-10-CM

## 2024-06-12 PROCEDURE — 3079F DIAST BP 80-89 MM HG: CPT | Performed by: NURSE PRACTITIONER

## 2024-06-12 PROCEDURE — 99214 OFFICE O/P EST MOD 30 MIN: CPT | Performed by: NURSE PRACTITIONER

## 2024-06-12 PROCEDURE — 3077F SYST BP >= 140 MM HG: CPT | Performed by: NURSE PRACTITIONER

## 2024-06-12 RX ORDER — TRIAMTERENE AND HYDROCHLOROTHIAZIDE 37.5; 25 MG/1; MG/1
1 CAPSULE ORAL DAILY
Qty: 30 CAPSULE | Refills: 0 | Status: SHIPPED | OUTPATIENT
Start: 2024-06-12

## 2024-06-12 RX ORDER — DIAZEPAM 2 MG/1
2 TABLET ORAL EVERY 12 HOURS PRN
Qty: 10 TABLET | Refills: 0 | Status: SHIPPED | OUTPATIENT
Start: 2024-06-12 | End: 2024-06-22

## 2024-06-12 NOTE — PROGRESS NOTES
6/12/2024    Chief Complaint   Patient presents with    Anxiety    Depression    Dizziness     She is going to see a balance doctor 6/17    Sleep Apnea       Leatha Campos is a 54 y.o. female, presents today for:      ASSESSMENT/PLAN:  1. Essential hypertension  Suboptimal today  Start Dyazide 37.5/ 25 due to concern for possible Menieres disease and uncontrolled blood pressure  Emphasized low sodium diet  Encouraged exercise for at least 30 min daily to help with stress and heart healthy  - triamterene-hydroCHLOROthiazide (DYAZIDE) 37.5-25 MG per capsule; Take 1 capsule by mouth daily Blood pressure  Dispense: 30 capsule; Refill: 0  - Basic Metabolic Panel    2. Generalized anxiety disorder with panic attacks  Suboptimal today  Recent initiation of Prozac by ENT for peripheral vestibulopathy 6/5/24.  Did not help her anxiety or dizziness in 2023 but patient willing to attempt again.  Recently stopped Lyrica and Amitriptyline due to concern for weight gain.      3. Tinnitus of both ears  Start Valium as that improved symptoms more than Prozac in 2023.  Encouraged to use sparingly.  Hoping when Prozac becomes therapeutic in 4 weeks symptoms may slightly improve.    Encouraged to participate in balance therapy.    - Basic Metabolic Panel  - diazePAM (VALIUM) 2 MG tablet; Take 1 tablet by mouth every 12 hours as needed for Anxiety for up to 10 days. Max Daily Amount: 4 mg  Dispense: 10 tablet; Refill: 0    4. Screening mammogram, encounter for  Mammogram screening ordered today.    - NICK DIGITAL SCREEN W OR WO CAD BILATERAL; Future      Return in about 6 weeks (around 7/24/2024) for RN visit in 2 weeks for BMP, with me in 6 weeks for nausea after eating, knee pain, htn follow up.    Presenting today for chronic disease follow up.     Updated OV with ENT for vertigo.  Restarted Prozac.  Unclear if will help as it did not in 2023 but she was also taking Amitriptyline at that time.  Vertigo worse over past several

## 2024-06-13 DIAGNOSIS — G47.33 SEVERE OBSTRUCTIVE SLEEP APNEA: ICD-10-CM

## 2024-06-13 DIAGNOSIS — Z78.9 INTOLERANCE OF CONTINUOUS POSITIVE AIRWAY PRESSURE (CPAP) VENTILATION: ICD-10-CM

## 2024-06-14 RX ORDER — ZOLPIDEM TARTRATE 5 MG/1
5 TABLET ORAL NIGHTLY PRN
Qty: 45 TABLET | Refills: 0 | OUTPATIENT
Start: 2024-06-14 | End: 2024-07-29

## 2024-07-08 DIAGNOSIS — I10 ESSENTIAL HYPERTENSION: ICD-10-CM

## 2024-07-08 RX ORDER — TRIAMTERENE AND HYDROCHLOROTHIAZIDE 37.5; 25 MG/1; MG/1
CAPSULE ORAL
Qty: 30 CAPSULE | Refills: 0 | Status: SHIPPED | OUTPATIENT
Start: 2024-07-08

## 2024-07-08 NOTE — TELEPHONE ENCOUNTER
Future appt scheduled 07/24/2024                   Last appt 06/12/2024      Last Written 06/12/2024      triamterene-hydroCHLOROthiazide (DYAZIDE) 37.5-25 MG per capsule   #30  0 RF

## 2024-07-10 ENCOUNTER — NURSE ONLY (OUTPATIENT)
Dept: FAMILY MEDICINE CLINIC | Age: 54
End: 2024-07-10
Payer: MEDICAID

## 2024-07-10 DIAGNOSIS — I10 ESSENTIAL HYPERTENSION: ICD-10-CM

## 2024-07-10 DIAGNOSIS — H93.13 TINNITUS OF BOTH EARS: ICD-10-CM

## 2024-07-10 LAB
ANION GAP SERPL CALCULATED.3IONS-SCNC: 12 MMOL/L (ref 3–16)
BUN SERPL-MCNC: 12 MG/DL (ref 7–20)
CALCIUM SERPL-MCNC: 9.5 MG/DL (ref 8.3–10.6)
CHLORIDE SERPL-SCNC: 104 MMOL/L (ref 99–110)
CO2 SERPL-SCNC: 25 MMOL/L (ref 21–32)
CREAT SERPL-MCNC: 0.8 MG/DL (ref 0.6–1.1)
GFR SERPLBLD CREATININE-BSD FMLA CKD-EPI: 88 ML/MIN/{1.73_M2}
GLUCOSE SERPL-MCNC: 120 MG/DL (ref 70–99)
POTASSIUM SERPL-SCNC: 4.1 MMOL/L (ref 3.5–5.1)
SODIUM SERPL-SCNC: 141 MMOL/L (ref 136–145)

## 2024-07-10 PROCEDURE — 36415 COLL VENOUS BLD VENIPUNCTURE: CPT | Performed by: NURSE PRACTITIONER

## 2024-07-11 NOTE — RESULT ENCOUNTER NOTE
Kidney function is stable after starting new blood pressure medication.  Keep next scheduled appointment

## 2024-07-19 ASSESSMENT — SLEEP AND FATIGUE QUESTIONNAIRES
HOW LIKELY ARE YOU TO NOD OFF OR FALL ASLEEP WHEN YOU ARE A PASSENGER IN A CAR FOR AN HOUR WITHOUT A BREAK: MODERATE CHANCE OF DOZING
HOW LIKELY ARE YOU TO NOD OFF OR FALL ASLEEP WHILE SITTING QUIETLY AFTER LUNCH WITHOUT ALCOHOL: MODERATE CHANCE OF DOZING
HOW LIKELY ARE YOU TO NOD OFF OR FALL ASLEEP WHILE SITTING INACTIVE IN A PUBLIC PLACE: MODERATE CHANCE OF DOZING
HOW LIKELY ARE YOU TO NOD OFF OR FALL ASLEEP WHILE WATCHING TV: MODERATE CHANCE OF DOZING
HOW LIKELY ARE YOU TO NOD OFF OR FALL ASLEEP WHILE SITTING AND READING: MODERATE CHANCE OF DOZING
HOW LIKELY ARE YOU TO NOD OFF OR FALL ASLEEP WHILE SITTING AND TALKING TO SOMEONE: MODERATE CHANCE OF DOZING
HOW LIKELY ARE YOU TO NOD OFF OR FALL ASLEEP WHILE LYING DOWN TO REST IN THE AFTERNOON WHEN CIRCUMSTANCES PERMIT: MODERATE CHANCE OF DOZING
HOW LIKELY ARE YOU TO NOD OFF OR FALL ASLEEP IN A CAR, WHILE STOPPED FOR A FEW MINUTES IN TRAFFIC: SLIGHT CHANCE OF DOZING

## 2024-07-22 ENCOUNTER — TELEPHONE (OUTPATIENT)
Dept: PULMONOLOGY | Age: 54
End: 2024-07-22

## 2024-07-22 ENCOUNTER — OFFICE VISIT (OUTPATIENT)
Age: 54
End: 2024-07-22
Payer: MEDICAID

## 2024-07-22 VITALS
OXYGEN SATURATION: 99 % | DIASTOLIC BLOOD PRESSURE: 82 MMHG | HEIGHT: 59 IN | SYSTOLIC BLOOD PRESSURE: 128 MMHG | RESPIRATION RATE: 16 BRPM | BODY MASS INDEX: 43.14 KG/M2 | HEART RATE: 62 BPM | WEIGHT: 214 LBS

## 2024-07-22 DIAGNOSIS — G47.00 INSOMNIA, UNSPECIFIED TYPE: ICD-10-CM

## 2024-07-22 DIAGNOSIS — G47.33 SEVERE OBSTRUCTIVE SLEEP APNEA: ICD-10-CM

## 2024-07-22 DIAGNOSIS — G47.33 SEVERE OBSTRUCTIVE SLEEP APNEA: Primary | ICD-10-CM

## 2024-07-22 DIAGNOSIS — Z78.9 INTOLERANCE OF CONTINUOUS POSITIVE AIRWAY PRESSURE (CPAP) VENTILATION: ICD-10-CM

## 2024-07-22 PROCEDURE — 99214 OFFICE O/P EST MOD 30 MIN: CPT

## 2024-07-22 PROCEDURE — 3074F SYST BP LT 130 MM HG: CPT

## 2024-07-22 PROCEDURE — 3079F DIAST BP 80-89 MM HG: CPT

## 2024-07-22 NOTE — PROGRESS NOTES
MA Communication:  The following orders are received by verbal communication from Miranda Syed PA-C.     Orders include:      CBTi session 1, session 2 & two sleep logs please-GIVEN  Medication agreement -pt signed in office  Ambien risk consent -pt signed in office  F/U ~7-8 wks ambien & BREANN  
Particular attention was paid to need for taking immediately before bedtime, risk of respiratory depression, risk of excessive sedation, falls, sleep walking and sleep talking, also other parasomnias possible.  Patient instructed to discontinue medication and call me with any side effects.

## 2024-07-22 NOTE — PATIENT INSTRUCTIONS
What's Next:   - Ambien as needed to tolerate CPAP; I encourage all people to research medications before starting them. Notify me of any parasomnias and discontinue immediately.   - CBTi session 1 & 2 and complete sleep logs   - Try out increased air pressures on CPAP    It was great to see you again and take care Leatha!  -Miranda      Never drive a car or operate a motorized vehicle while drowsy or sleepy.       Sleep Hygiene... Important practices for better sleep:  Avoid naps. This will ensure you are sleepy at bedtime.  If you have to take a nap, sleep 30 minutes or less before 3 pm.  Have a fixed bedtime and awakening time. The human body thrives on routines. Only deviate from these set times by no more than 1 hour, including on weekends.  Avoid exposure to electronics/screens within 2 hours of your desired sleep time. Light from screens inhibits melatonin production which stops our brain from helping us get to sleep.   Go to bed only when sleepy; this reduces the time you are awake in bed (which can lead to frustration and negative thoughts about sleep). If you can't fall asleep within 15-30 minutes, get up and do something boring until you feel sleepy again. Absolutely no electronic screens during this time.    Only use your bed for sleeping & intimacy. Do not use your bed as an office, workroom or recreation room.    Develop sleep rituals that you go through the same way every night.  Stay away from stimulants such as caffeine and nicotine. Caffeine can remain in your system for well over 10 hours, so no caffeine after lunch. Caffeine is found in tea, cola, coffee, cocoa and chocolate.    Avoid alcohol 2-4 hours before bedtime. As alcohol is metabolized, the result is a stimulant or \"wake-up\" effect and will cause fragmented sleep.   Regular exercise is recommended to help you deepen your sleep (and MANY other reasons), but timing is important--aim to exercise in the morning or early afternoon, not within

## 2024-07-24 NOTE — TELEPHONE ENCOUNTER
Patient calling stating pharmacy has not received her refill of this medication.  Patient is out.  Please send Rx and call patient to let her know this is sent.

## 2024-07-25 RX ORDER — ZOLPIDEM TARTRATE 5 MG/1
5 TABLET ORAL NIGHTLY PRN
Qty: 30 TABLET | Refills: 1 | Status: SHIPPED | OUTPATIENT
Start: 2024-07-25 | End: 2024-09-23

## 2024-07-29 DIAGNOSIS — K21.00 GASTROESOPHAGEAL REFLUX DISEASE WITH ESOPHAGITIS WITHOUT HEMORRHAGE: ICD-10-CM

## 2024-07-29 DIAGNOSIS — K27.9 PEPTIC ULCER: ICD-10-CM

## 2024-07-29 RX ORDER — SUCRALFATE 1 G/1
1 TABLET ORAL 4 TIMES DAILY
Qty: 120 TABLET | Refills: 5 | Status: SHIPPED | OUTPATIENT
Start: 2024-07-29

## 2024-07-29 NOTE — TELEPHONE ENCOUNTER
Patient returned call to the office.  Message from Miranda relayed.  Patient will call back if she has any further questions.

## 2024-07-30 ENCOUNTER — OFFICE VISIT (OUTPATIENT)
Dept: FAMILY MEDICINE CLINIC | Age: 54
End: 2024-07-30
Payer: MEDICAID

## 2024-07-30 VITALS
OXYGEN SATURATION: 98 % | WEIGHT: 215 LBS | DIASTOLIC BLOOD PRESSURE: 80 MMHG | BODY MASS INDEX: 43.42 KG/M2 | TEMPERATURE: 97.6 F | SYSTOLIC BLOOD PRESSURE: 142 MMHG | HEART RATE: 64 BPM

## 2024-07-30 DIAGNOSIS — M54.41 CHRONIC BILATERAL LOW BACK PAIN WITH BILATERAL SCIATICA: ICD-10-CM

## 2024-07-30 DIAGNOSIS — M54.42 CHRONIC BILATERAL LOW BACK PAIN WITH BILATERAL SCIATICA: ICD-10-CM

## 2024-07-30 DIAGNOSIS — E65 DORSAL CERVICAL FAT PAD: ICD-10-CM

## 2024-07-30 DIAGNOSIS — H93.13 TINNITUS OF BOTH EARS: Primary | ICD-10-CM

## 2024-07-30 DIAGNOSIS — G89.29 CHRONIC PAIN OF LEFT KNEE: ICD-10-CM

## 2024-07-30 DIAGNOSIS — H93.3X9 VESTIBULAR NERVE DISORDER, UNSPECIFIED LATERALITY: ICD-10-CM

## 2024-07-30 DIAGNOSIS — I10 ESSENTIAL HYPERTENSION: Chronic | ICD-10-CM

## 2024-07-30 DIAGNOSIS — G89.29 CHRONIC BILATERAL LOW BACK PAIN WITH BILATERAL SCIATICA: ICD-10-CM

## 2024-07-30 DIAGNOSIS — M25.562 CHRONIC PAIN OF LEFT KNEE: ICD-10-CM

## 2024-07-30 PROCEDURE — 3077F SYST BP >= 140 MM HG: CPT | Performed by: NURSE PRACTITIONER

## 2024-07-30 PROCEDURE — 3079F DIAST BP 80-89 MM HG: CPT | Performed by: NURSE PRACTITIONER

## 2024-07-30 PROCEDURE — 99214 OFFICE O/P EST MOD 30 MIN: CPT | Performed by: NURSE PRACTITIONER

## 2024-07-30 RX ORDER — PREGABALIN 25 MG/1
25 CAPSULE ORAL 3 TIMES DAILY
Qty: 270 CAPSULE | Refills: 0 | Status: SHIPPED | OUTPATIENT
Start: 2024-07-30 | End: 2024-10-28

## 2024-07-30 RX ORDER — LOSARTAN POTASSIUM 25 MG/1
25 TABLET ORAL DAILY
Qty: 30 TABLET | Refills: 5 | Status: SHIPPED | OUTPATIENT
Start: 2024-07-30

## 2024-07-30 RX ORDER — DEXAMETHASONE 1 MG
TABLET ORAL
Qty: 1 TABLET | Refills: 0 | Status: SHIPPED | OUTPATIENT
Start: 2024-07-30

## 2024-07-30 ASSESSMENT — ENCOUNTER SYMPTOMS
ABDOMINAL PAIN: 0
RHINORRHEA: 0
SORE THROAT: 0
WHEEZING: 0
SHORTNESS OF BREATH: 1
BACK PAIN: 1
VOMITING: 0

## 2024-07-30 ASSESSMENT — PATIENT HEALTH QUESTIONNAIRE - PHQ9
6. FEELING BAD ABOUT YOURSELF - OR THAT YOU ARE A FAILURE OR HAVE LET YOURSELF OR YOUR FAMILY DOWN: SEVERAL DAYS
4. FEELING TIRED OR HAVING LITTLE ENERGY: NEARLY EVERY DAY
1. LITTLE INTEREST OR PLEASURE IN DOING THINGS: NEARLY EVERY DAY
SUM OF ALL RESPONSES TO PHQ QUESTIONS 1-9: 15
5. POOR APPETITE OR OVEREATING: SEVERAL DAYS
SUM OF ALL RESPONSES TO PHQ QUESTIONS 1-9: 15
SUM OF ALL RESPONSES TO PHQ9 QUESTIONS 1 & 2: 6
SUM OF ALL RESPONSES TO PHQ QUESTIONS 1-9: 15
9. THOUGHTS THAT YOU WOULD BE BETTER OFF DEAD, OR OF HURTING YOURSELF: NOT AT ALL
3. TROUBLE FALLING OR STAYING ASLEEP: NEARLY EVERY DAY
SUM OF ALL RESPONSES TO PHQ QUESTIONS 1-9: 15
2. FEELING DOWN, DEPRESSED OR HOPELESS: NEARLY EVERY DAY
7. TROUBLE CONCENTRATING ON THINGS, SUCH AS READING THE NEWSPAPER OR WATCHING TELEVISION: SEVERAL DAYS
8. MOVING OR SPEAKING SO SLOWLY THAT OTHER PEOPLE COULD HAVE NOTICED. OR THE OPPOSITE, BEING SO FIGETY OR RESTLESS THAT YOU HAVE BEEN MOVING AROUND A LOT MORE THAN USUAL: NOT AT ALL
10. IF YOU CHECKED OFF ANY PROBLEMS, HOW DIFFICULT HAVE THESE PROBLEMS MADE IT FOR YOU TO DO YOUR WORK, TAKE CARE OF THINGS AT HOME, OR GET ALONG WITH OTHER PEOPLE: SOMEWHAT DIFFICULT

## 2024-07-30 NOTE — PROGRESS NOTES
7/30/2024    Chief Complaint   Patient presents with    Nausea     Meera after eating she gets very nauseous she is using the carafate and the protonix and that helps some     Hypertension    Knee Pain     Left knee pain seems worse lately and goes down the leg, would like to get xrays     Anxiety     Stopped the prozac because it seemed to make her angry     Insomnia     She is only getting 3 to 4 hours of sleep on the ambien        Leatha LUCERO Campos is a 54 y.o. female, presents today for:      ASSESSMENT/PLAN:  1. Chronic bilateral low back pain with bilateral sciatica  Restart Lyrica for back pain/ generalized pain. Concern for possible right sided meralgia parasthetica.  Start at low dose for now due to pt prior concern of weight gain with medication  - pregabalin (LYRICA) 25 MG capsule; Take 1 capsule by mouth 3 times daily for 90 days. Max Daily Amount: 75 mg  Dispense: 270 capsule; Refill: 0    2. Tinnitus of both ears  Recurrent tinnitus, vertigo without improvement with Prozac.  Mild improvement with Lyrica/ Amitriptyline.  Has not attempted Vestibular therapy at this time.  Referral placed to  ENT for evaluation due to recurrent severe symptoms  - Non [facility] - External Referral To ENT    3. Vestibular nerve disorder, unspecified laterality  See above  - Non [facility] - External Referral To ENT    4. Chronic pain of left knee  No improvement in pain over past several months.    - Yusef Mortensen MD, Orthopedic Surgery (Shoulder; Hip; Knee), [facility]    5. Essential hypertension  Suboptimal today.  Unclear if BP is causing worsening vertigo symptoms.  Continue Dyazide for now.  Start Losartan.    - losartan (COZAAR) 25 MG tablet; Take 1 tablet by mouth daily  Dispense: 30 tablet; Refill: 5    6. Dorsal cervical fat pad  Noted Cervical fat pad.  Will order ACTH and cortisol for Addisons/ Cushings evaluation.  Discssed need to take Decadron at 11 PM night before testing.    - dexAMETHasone (DECADRON) 1

## 2024-07-31 DIAGNOSIS — G89.29 CHRONIC BILATERAL LOW BACK PAIN WITH BILATERAL SCIATICA: ICD-10-CM

## 2024-07-31 DIAGNOSIS — F41.1 GAD (GENERALIZED ANXIETY DISORDER): ICD-10-CM

## 2024-07-31 DIAGNOSIS — M54.42 CHRONIC BILATERAL LOW BACK PAIN WITH BILATERAL SCIATICA: ICD-10-CM

## 2024-07-31 DIAGNOSIS — M54.41 CHRONIC BILATERAL LOW BACK PAIN WITH BILATERAL SCIATICA: ICD-10-CM

## 2024-07-31 DIAGNOSIS — F33.2 SEVERE EPISODE OF RECURRENT MAJOR DEPRESSIVE DISORDER, WITHOUT PSYCHOTIC FEATURES (HCC): ICD-10-CM

## 2024-08-09 ENCOUNTER — HOSPITAL ENCOUNTER (OUTPATIENT)
Dept: MAMMOGRAPHY | Age: 54
Discharge: HOME | End: 2024-08-09
Payer: MEDICAID

## 2024-08-09 VITALS — HEIGHT: 59 IN | BODY MASS INDEX: 43.14 KG/M2 | WEIGHT: 214 LBS

## 2024-08-09 DIAGNOSIS — Z12.31 SCREENING MAMMOGRAM, ENCOUNTER FOR: ICD-10-CM

## 2024-08-09 PROCEDURE — 77063 BREAST TOMOSYNTHESIS BI: CPT

## 2024-08-12 ENCOUNTER — OFFICE VISIT (OUTPATIENT)
Dept: ORTHOPEDIC SURGERY | Age: 54
End: 2024-08-12
Payer: MEDICAID

## 2024-08-12 ENCOUNTER — PREP FOR PROCEDURE (OUTPATIENT)
Dept: ORTHOPEDIC SURGERY | Age: 54
End: 2024-08-12

## 2024-08-12 VITALS — WEIGHT: 214 LBS | BODY MASS INDEX: 43.14 KG/M2 | HEIGHT: 59 IN

## 2024-08-12 DIAGNOSIS — M18.12 PRIMARY OSTEOARTHRITIS OF FIRST CARPOMETACARPAL JOINT OF LEFT HAND: ICD-10-CM

## 2024-08-12 DIAGNOSIS — G56.02 CARPAL TUNNEL SYNDROME ON LEFT: ICD-10-CM

## 2024-08-12 DIAGNOSIS — G56.02 LEFT CARPAL TUNNEL SYNDROME: ICD-10-CM

## 2024-08-12 DIAGNOSIS — M18.12 ARTHRITIS OF CARPOMETACARPAL (CMC) JOINT OF LEFT THUMB: Primary | ICD-10-CM

## 2024-08-12 PROCEDURE — 99214 OFFICE O/P EST MOD 30 MIN: CPT | Performed by: STUDENT IN AN ORGANIZED HEALTH CARE EDUCATION/TRAINING PROGRAM

## 2024-08-13 ENCOUNTER — HOSPITAL ENCOUNTER (OUTPATIENT)
Age: 54
Discharge: HOME | End: 2024-08-13
Payer: MEDICAID

## 2024-08-13 DIAGNOSIS — E65 DORSAL CERVICAL FAT PAD: ICD-10-CM

## 2024-08-13 LAB — CORTIS AM PEAK SERPL-MCNC: <0.8 UG/DL (ref 4.3–22.4)

## 2024-08-13 PROCEDURE — 36415 COLL VENOUS BLD VENIPUNCTURE: CPT

## 2024-08-13 PROCEDURE — 82024 ASSAY OF ACTH: CPT

## 2024-08-13 PROCEDURE — 82533 TOTAL CORTISOL: CPT

## 2024-08-13 NOTE — PROGRESS NOTES
Hand, Upper Extremity and Reconstructive Surgery                Fatuma Gill MD                                           Summary of Upper Extremity Problems and Interventions     Diagnosis: Left thumb pain and left carpal tunnel syndrome    History of Present Illness     Interval update 8/12/2024-patient presents today for follow-up of her left thumb and carpal tunnel syndrome.  She had a steroid injection at her last visit with me in April.  She reports improvement of her pain for approximately 3-4 weeks.  She has had recurrence of her thumb pain.  It is significantly affected her quality of life and functional capability.  She reports decreased  strength.  The pain is significant at night.  She does report some paresthesias in a median nerve distribution as well but her thumb base pain is what is bothering her predominantly. She has been taking meloxicam.     Leatha Campos is a 54 y.o. right hand dominant female who presents for follow-up of left thumb pain.  Patient had a steroid injection on 12/6/2023.  She had improvement of her symptoms for 3 to 4 weeks.  However since that time she has had significant left thumb pain.  She states she can barely pick anything up.  She has 7/10 pain constantly.  Occasionally gets worse when she hits it on things.  The pain wakes her up at night.  She is unable to hold or do most things with her left hand.  She does not do any kind of heavy work mostly household items.  She cares for her 6-year-old granddaughter.  She does also report some numbness and tingling in her thumb and index finger.    Allergies     Allergies   Allergen Reactions    Cymbalta [Duloxetine Hcl] Nausea Only    Albuterol Palpitations       Home Medications     Current Outpatient Medications   Medication Instructions    ADVAIR DISKUS 500-50 MCG/ACT AEPB diskus inhaler INHALE 1 DOSE BY MOUTH IN THE MORNING AND 1 IN THE EVENING RINSE MOUTH AFTER USE    albuterol

## 2024-08-14 ENCOUNTER — TELEPHONE (OUTPATIENT)
Dept: FAMILY MEDICINE CLINIC | Age: 54
End: 2024-08-14

## 2024-08-14 NOTE — TELEPHONE ENCOUNTER
Noted.  Waiting for ACTH level. She was supposed to take Dexamethasone last evening which can lower her cortisol level

## 2024-08-16 LAB — ACTH PLAS-MCNC: 3 PG/ML (ref 7–63)

## 2024-08-17 ENCOUNTER — PATIENT MESSAGE (OUTPATIENT)
Dept: FAMILY MEDICINE CLINIC | Age: 54
End: 2024-08-17

## 2024-08-17 DIAGNOSIS — E27.9 ADRENAL ABNORMALITY (HCC): ICD-10-CM

## 2024-08-17 DIAGNOSIS — E65 DORSAL CERVICAL FAT PAD: Primary | ICD-10-CM

## 2024-08-20 NOTE — PROGRESS NOTES
1.  Do not eat or drink anything after 12 midnight prior to surgery.  This includes no water, chewing gum or mints.  2.  Take the following pills with a small sip of water on the morning of surgery .protonix  3.  Aspirin, Ibuprofen, Advil, Naproxen, Vitamin E and other Anti-inflammatory products should be stopped for 5 days before surgery or as directed by your physician.  4.  Check with your doctor regarding stopping Plavix, Coumadin, Lovenox, Fragmin or other blood thinners.  5.  Do not smoke and do not drink alcoholic beverages 24 hours prior to surgery.  This includes NA Beer.  6.  You may brush your teeth and gargle the morning of surgery.  DO NOT SWALLOW WATER.  7.  You MUST make arrangements for a responsible adult to take you home after your surgery.  You will not be allowed to leave alone or drive yourself home.  It is strongly suggested someone stay with you the first 24 hours.  Your surgery will be cancelled if you do not have a ride home.  8.  A parent/legal guardian must accompany a child scheduled for surgery and plan to stay at the hospital until the child is discharged.  Please do not bring other children with you.  9.  Please wear simple, loose fitting clothing to the hospital.  Do not bring valuables ( money, credit cards, checkbooks, etc.)  Do not wear any makeup (including no eye makeup) or nail polish on your fingers or toes.  10.  Do not wear any jewelry or piercing on the day of surgery.  All body piercing jewelry must be removed.  11.  If you have dentures, they will be removed before going to the OR; we will provide you a container.  If you wear contact lenses or glasses, they will be removed; please bring a case for them.  12.  Please see your family doctor/pediatrician for a history & physical and/or concerning medications.  Bring any test results/reports from your physician's office the day of surgery.  PCP   Phone     H&P appt date   13.  Remember to bring Blood Bank Bracelet to the  hospital on the day of surgery.  14.  If you have a Living Will and Durable Power of  for Healthcare, please bring in a copy.  15.  Notify your Surgeon if you develop any illness between now and surgery time; cough, cold, fever, sore throat, nausea, vomiting, etc.  Please notify your surgeon if you experience dizziness, shortness of breath or blurred vision between now and the time of your surgery.  16.  DO NOT shave your operative site 96 hours (4 days) prior to surgery.  For face and neck surgery, men may use an electric razor 48 hours (2 days) prior to surgery.  17.  Shower the night before surgery and the morning of surgery with   Antibacterial soap   Hibiclens or  Chlorhexidine gluconate.      To provide excellent care, visitors will be limited to two in a room at any given time.  Please no children under the age of 14 in the surgical department.

## 2024-08-21 ENCOUNTER — HOSPITAL ENCOUNTER (OUTPATIENT)
Age: 54
Discharge: HOME | End: 2024-08-21
Payer: MEDICAID

## 2024-08-21 DIAGNOSIS — E27.9 ADRENAL ABNORMALITY (HCC): ICD-10-CM

## 2024-08-21 DIAGNOSIS — E65 DORSAL CERVICAL FAT PAD: ICD-10-CM

## 2024-08-21 PROCEDURE — 82533 TOTAL CORTISOL: CPT

## 2024-08-21 PROCEDURE — 84244 ASSAY OF RENIN: CPT

## 2024-08-21 PROCEDURE — 82088 ASSAY OF ALDOSTERONE: CPT

## 2024-08-23 DIAGNOSIS — E27.9 ADRENAL ABNORMALITY (HCC): ICD-10-CM

## 2024-08-23 DIAGNOSIS — E65 DORSAL CERVICAL FAT PAD: Primary | ICD-10-CM

## 2024-08-23 DIAGNOSIS — R53.82 CHRONIC FATIGUE: ICD-10-CM

## 2024-08-24 LAB
ALDOST SERPL-MCNC: 7.8 NG/DL
ALDOST/RENIN PLAS-RTO: 2.8 RATIO
RENIN PLAS-CCNC: 2.8 NG/ML/HR

## 2024-08-25 LAB — CORTIS SAL-MCNC: 0.11 UG/DL

## 2024-08-26 ENCOUNTER — ANESTHESIA EVENT (OUTPATIENT)
Dept: OPERATING ROOM | Age: 54
End: 2024-08-26
Payer: MEDICAID

## 2024-08-26 NOTE — ANESTHESIA PRE PROCEDURE
Department of Anesthesiology  Preprocedure Note       Name:  Leatha Campos   Age:  54 y.o.  :  1970                                          MRN:  [id number]         Date:  2024      Surgeon: Surgeon(s):  Fatuma Gill MD    Procedure: Procedure(s):  LEFT THUMB TRAPEZIECTOMY, LIGAMENT RECONSTRUCTION AND TENDON INTERPOSITION, LEFT CARPAL TUNNEL RELEASE --BLOCK--    Medications prior to admission:   Prior to Admission medications    Medication Sig Start Date End Date Taking? Authorizing Provider   losartan (COZAAR) 25 MG tablet Take 1 tablet by mouth daily 24   Tori Barrios APRN - CNP   pregabalin (LYRICA) 25 MG capsule Take 1 capsule by mouth 3 times daily for 90 days. Max Daily Amount: 75 mg 7/30/24 10/28/24  Tori Barrios APRN - CNP   dexAMETHasone (DECADRON) 1 MG tablet Take 1 tablet at 11 PM night before labs 24   Tori Barrios APRN - CNP   sucralfate (CARAFATE) 1 GM tablet Take 1 tablet by mouth 4 times daily 24   Tori Barrios APRN - CNP   zolpidem (AMBIEN) 5 MG tablet Take 1 tablet by mouth nightly as needed (to tolerate CPAP) for up to 60 days. 24  Miranda Syed PA-C   triamterene-hydroCHLOROthiazide (DYAZIDE) 37.5-25 MG per capsule TAKE 1 CAPSULE BY MOUTH ONCE DAILY FOR BLOOD PRESSURE 24   Tori Barrios APRN - CNP   ADVAIR DISKUS 500-50 MCG/ACT AEPB diskus inhaler INHALE 1 DOSE BY MOUTH IN THE MORNING AND 1 IN THE EVENING RINSE MOUTH AFTER USE 24   Miranda Syed PA-C   albuterol (PROVENTIL) (2.5 MG/3ML) 0.083% nebulizer solution Take 3 mLs by nebulization every 4 hours as needed for Wheezing or Shortness of Breath 24  Miranda Syed PA-C   tiotropium (SPIRIVA RESPIMAT) 2.5 MCG/ACT AERS inhaler Inhale 2 puffs into the lungs daily 24   Miranda Syed PA-C   meloxicam (MOBIC) 15 MG tablet Take 1 tablet by mouth daily 4/15/24   Fatuma Gill MD   levalbuterol (XOPENEX HFA) 45 MCG/ACT inhaler

## 2024-08-27 ENCOUNTER — ANESTHESIA (OUTPATIENT)
Dept: OPERATING ROOM | Age: 54
End: 2024-08-27
Payer: MEDICAID

## 2024-08-27 ENCOUNTER — HOSPITAL ENCOUNTER (OUTPATIENT)
Age: 54
Setting detail: OUTPATIENT SURGERY
Discharge: HOME | End: 2024-08-27
Attending: STUDENT IN AN ORGANIZED HEALTH CARE EDUCATION/TRAINING PROGRAM | Admitting: STUDENT IN AN ORGANIZED HEALTH CARE EDUCATION/TRAINING PROGRAM
Payer: MEDICAID

## 2024-08-27 VITALS
HEART RATE: 82 BPM | DIASTOLIC BLOOD PRESSURE: 78 MMHG | BODY MASS INDEX: 43.14 KG/M2 | SYSTOLIC BLOOD PRESSURE: 155 MMHG | OXYGEN SATURATION: 99 % | HEIGHT: 59 IN | WEIGHT: 214 LBS | TEMPERATURE: 97.8 F | RESPIRATION RATE: 13 BRPM

## 2024-08-27 DIAGNOSIS — G56.02 CARPAL TUNNEL SYNDROME ON LEFT: Primary | ICD-10-CM

## 2024-08-27 DIAGNOSIS — M18.12 PRIMARY OSTEOARTHRITIS OF FIRST CARPOMETACARPAL JOINT OF LEFT HAND: ICD-10-CM

## 2024-08-27 PROCEDURE — 6370000000 HC RX 637 (ALT 250 FOR IP): Performed by: ANESTHESIOLOGY

## 2024-08-27 PROCEDURE — 2709999900 HC NON-CHARGEABLE SUPPLY: Performed by: STUDENT IN AN ORGANIZED HEALTH CARE EDUCATION/TRAINING PROGRAM

## 2024-08-27 PROCEDURE — 25447 ARTHRP NTRCRP/CRP/MTCR NTRPS: CPT | Performed by: STUDENT IN AN ORGANIZED HEALTH CARE EDUCATION/TRAINING PROGRAM

## 2024-08-27 PROCEDURE — 64415 NJX AA&/STRD BRCH PLXS IMG: CPT | Performed by: ANESTHESIOLOGY

## 2024-08-27 PROCEDURE — 3700000001 HC ADD 15 MINUTES (ANESTHESIA): Performed by: STUDENT IN AN ORGANIZED HEALTH CARE EDUCATION/TRAINING PROGRAM

## 2024-08-27 PROCEDURE — 3700000000 HC ANESTHESIA ATTENDED CARE: Performed by: STUDENT IN AN ORGANIZED HEALTH CARE EDUCATION/TRAINING PROGRAM

## 2024-08-27 PROCEDURE — 3600000003 HC SURGERY LEVEL 3 BASE: Performed by: STUDENT IN AN ORGANIZED HEALTH CARE EDUCATION/TRAINING PROGRAM

## 2024-08-27 PROCEDURE — 2500000003 HC RX 250 WO HCPCS: Performed by: ANESTHESIOLOGY

## 2024-08-27 PROCEDURE — 2500000003 HC RX 250 WO HCPCS: Performed by: NURSE ANESTHETIST, CERTIFIED REGISTERED

## 2024-08-27 PROCEDURE — 7100000000 HC PACU RECOVERY - FIRST 15 MIN: Performed by: STUDENT IN AN ORGANIZED HEALTH CARE EDUCATION/TRAINING PROGRAM

## 2024-08-27 PROCEDURE — 2580000003 HC RX 258: Performed by: ANESTHESIOLOGY

## 2024-08-27 PROCEDURE — 6360000002 HC RX W HCPCS: Performed by: NURSE ANESTHETIST, CERTIFIED REGISTERED

## 2024-08-27 PROCEDURE — 7100000001 HC PACU RECOVERY - ADDTL 15 MIN: Performed by: STUDENT IN AN ORGANIZED HEALTH CARE EDUCATION/TRAINING PROGRAM

## 2024-08-27 PROCEDURE — 3600000013 HC SURGERY LEVEL 3 ADDTL 15MIN: Performed by: STUDENT IN AN ORGANIZED HEALTH CARE EDUCATION/TRAINING PROGRAM

## 2024-08-27 PROCEDURE — 7100000011 HC PHASE II RECOVERY - ADDTL 15 MIN: Performed by: STUDENT IN AN ORGANIZED HEALTH CARE EDUCATION/TRAINING PROGRAM

## 2024-08-27 PROCEDURE — 2580000003 HC RX 258: Performed by: STUDENT IN AN ORGANIZED HEALTH CARE EDUCATION/TRAINING PROGRAM

## 2024-08-27 PROCEDURE — 6360000002 HC RX W HCPCS: Performed by: ANESTHESIOLOGY

## 2024-08-27 PROCEDURE — 64721 CARPAL TUNNEL SURGERY: CPT | Performed by: STUDENT IN AN ORGANIZED HEALTH CARE EDUCATION/TRAINING PROGRAM

## 2024-08-27 PROCEDURE — C1713 ANCHOR/SCREW BN/BN,TIS/BN: HCPCS | Performed by: STUDENT IN AN ORGANIZED HEALTH CARE EDUCATION/TRAINING PROGRAM

## 2024-08-27 PROCEDURE — 7100000010 HC PHASE II RECOVERY - FIRST 15 MIN: Performed by: STUDENT IN AN ORGANIZED HEALTH CARE EDUCATION/TRAINING PROGRAM

## 2024-08-27 PROCEDURE — 6360000002 HC RX W HCPCS: Performed by: STUDENT IN AN ORGANIZED HEALTH CARE EDUCATION/TRAINING PROGRAM

## 2024-08-27 DEVICE — FIBERLOCK SUSPENSION SYSTEM
Type: IMPLANTABLE DEVICE | Site: HAND | Status: FUNCTIONAL
Brand: ARTHREX®

## 2024-08-27 RX ORDER — NALOXONE HYDROCHLORIDE 0.4 MG/ML
INJECTION, SOLUTION INTRAMUSCULAR; INTRAVENOUS; SUBCUTANEOUS PRN
Status: DISCONTINUED | OUTPATIENT
Start: 2024-08-27 | End: 2024-08-27 | Stop reason: HOSPADM

## 2024-08-27 RX ORDER — ONDANSETRON 2 MG/ML
INJECTION INTRAMUSCULAR; INTRAVENOUS PRN
Status: DISCONTINUED | OUTPATIENT
Start: 2024-08-27 | End: 2024-08-27 | Stop reason: SDUPTHER

## 2024-08-27 RX ORDER — LABETALOL HYDROCHLORIDE 5 MG/ML
5 INJECTION, SOLUTION INTRAVENOUS EVERY 10 MIN PRN
Status: DISCONTINUED | OUTPATIENT
Start: 2024-08-27 | End: 2024-08-27 | Stop reason: HOSPADM

## 2024-08-27 RX ORDER — SODIUM CHLORIDE 0.9 % (FLUSH) 0.9 %
5-40 SYRINGE (ML) INJECTION PRN
Status: DISCONTINUED | OUTPATIENT
Start: 2024-08-27 | End: 2024-08-27 | Stop reason: HOSPADM

## 2024-08-27 RX ORDER — MIDAZOLAM HYDROCHLORIDE 1 MG/ML
INJECTION INTRAMUSCULAR; INTRAVENOUS
Status: COMPLETED
Start: 2024-08-27 | End: 2024-08-27

## 2024-08-27 RX ORDER — APREPITANT 40 MG/1
CAPSULE ORAL
Status: DISCONTINUED
Start: 2024-08-27 | End: 2024-08-27 | Stop reason: HOSPADM

## 2024-08-27 RX ORDER — SODIUM CHLORIDE 0.9 % (FLUSH) 0.9 %
5-40 SYRINGE (ML) INJECTION EVERY 12 HOURS SCHEDULED
Status: DISCONTINUED | OUTPATIENT
Start: 2024-08-27 | End: 2024-08-27 | Stop reason: HOSPADM

## 2024-08-27 RX ORDER — OXYCODONE HYDROCHLORIDE 5 MG/1
10 TABLET ORAL PRN
Status: DISCONTINUED | OUTPATIENT
Start: 2024-08-27 | End: 2024-08-27 | Stop reason: HOSPADM

## 2024-08-27 RX ORDER — LIDOCAINE HYDROCHLORIDE 20 MG/ML
INJECTION, SOLUTION EPIDURAL; INFILTRATION; INTRACAUDAL; PERINEURAL PRN
Status: DISCONTINUED | OUTPATIENT
Start: 2024-08-27 | End: 2024-08-27 | Stop reason: SDUPTHER

## 2024-08-27 RX ORDER — EPHEDRINE SULFATE 50 MG/ML
INJECTION INTRAVENOUS PRN
Status: DISCONTINUED | OUTPATIENT
Start: 2024-08-27 | End: 2024-08-27 | Stop reason: SDUPTHER

## 2024-08-27 RX ORDER — APREPITANT 40 MG/1
40 CAPSULE ORAL ONCE
Status: COMPLETED | OUTPATIENT
Start: 2024-08-27 | End: 2024-08-27

## 2024-08-27 RX ORDER — MEPERIDINE HYDROCHLORIDE 25 MG/ML
12.5 INJECTION INTRAMUSCULAR; INTRAVENOUS; SUBCUTANEOUS EVERY 5 MIN PRN
Status: DISCONTINUED | OUTPATIENT
Start: 2024-08-27 | End: 2024-08-27 | Stop reason: HOSPADM

## 2024-08-27 RX ORDER — LIDOCAINE HYDROCHLORIDE 10 MG/ML
2 INJECTION, SOLUTION INFILTRATION; PERINEURAL
Status: DISCONTINUED | OUTPATIENT
Start: 2024-08-27 | End: 2024-08-27 | Stop reason: HOSPADM

## 2024-08-27 RX ORDER — SCOLOPAMINE TRANSDERMAL SYSTEM 1 MG/1
1 PATCH, EXTENDED RELEASE TRANSDERMAL ONCE
Status: DISCONTINUED | OUTPATIENT
Start: 2024-08-27 | End: 2024-08-27 | Stop reason: HOSPADM

## 2024-08-27 RX ORDER — SODIUM CHLORIDE, SODIUM LACTATE, POTASSIUM CHLORIDE, CALCIUM CHLORIDE 600; 310; 30; 20 MG/100ML; MG/100ML; MG/100ML; MG/100ML
INJECTION, SOLUTION INTRAVENOUS CONTINUOUS
Status: DISCONTINUED | OUTPATIENT
Start: 2024-08-27 | End: 2024-08-27 | Stop reason: HOSPADM

## 2024-08-27 RX ORDER — SCOLOPAMINE TRANSDERMAL SYSTEM 1 MG/1
PATCH, EXTENDED RELEASE TRANSDERMAL
Status: DISCONTINUED
Start: 2024-08-27 | End: 2024-08-27 | Stop reason: HOSPADM

## 2024-08-27 RX ORDER — FAMOTIDINE 10 MG/ML
INJECTION, SOLUTION INTRAVENOUS
Status: DISCONTINUED
Start: 2024-08-27 | End: 2024-08-27 | Stop reason: HOSPADM

## 2024-08-27 RX ORDER — DIPHENHYDRAMINE HYDROCHLORIDE 50 MG/ML
12.5 INJECTION INTRAMUSCULAR; INTRAVENOUS
Status: DISCONTINUED | OUTPATIENT
Start: 2024-08-27 | End: 2024-08-27 | Stop reason: HOSPADM

## 2024-08-27 RX ORDER — SODIUM CHLORIDE 9 MG/ML
INJECTION, SOLUTION INTRAVENOUS PRN
Status: DISCONTINUED | OUTPATIENT
Start: 2024-08-27 | End: 2024-08-27 | Stop reason: HOSPADM

## 2024-08-27 RX ORDER — BUPIVACAINE HYDROCHLORIDE 5 MG/ML
INJECTION, SOLUTION EPIDURAL; INTRACAUDAL
Status: DISCONTINUED
Start: 2024-08-27 | End: 2024-08-27 | Stop reason: HOSPADM

## 2024-08-27 RX ORDER — ONDANSETRON 2 MG/ML
4 INJECTION INTRAMUSCULAR; INTRAVENOUS
Status: DISCONTINUED | OUTPATIENT
Start: 2024-08-27 | End: 2024-08-27 | Stop reason: HOSPADM

## 2024-08-27 RX ORDER — HYDROCODONE BITARTRATE AND ACETAMINOPHEN 5; 325 MG/1; MG/1
1 TABLET ORAL EVERY 6 HOURS PRN
Qty: 28 TABLET | Refills: 0 | Status: SHIPPED | OUTPATIENT
Start: 2024-08-27 | End: 2024-09-03

## 2024-08-27 RX ORDER — MIDAZOLAM HYDROCHLORIDE 1 MG/ML
INJECTION INTRAMUSCULAR; INTRAVENOUS PRN
Status: DISCONTINUED | OUTPATIENT
Start: 2024-08-27 | End: 2024-08-27 | Stop reason: SDUPTHER

## 2024-08-27 RX ORDER — OXYCODONE HYDROCHLORIDE 5 MG/1
5 TABLET ORAL PRN
Status: DISCONTINUED | OUTPATIENT
Start: 2024-08-27 | End: 2024-08-27 | Stop reason: HOSPADM

## 2024-08-27 RX ORDER — DEXAMETHASONE SODIUM PHOSPHATE 4 MG/ML
INJECTION, SOLUTION INTRA-ARTICULAR; INTRALESIONAL; INTRAMUSCULAR; INTRAVENOUS; SOFT TISSUE PRN
Status: DISCONTINUED | OUTPATIENT
Start: 2024-08-27 | End: 2024-08-27 | Stop reason: SDUPTHER

## 2024-08-27 RX ORDER — GLYCOPYRROLATE 0.2 MG/ML
INJECTION INTRAMUSCULAR; INTRAVENOUS PRN
Status: DISCONTINUED | OUTPATIENT
Start: 2024-08-27 | End: 2024-08-27 | Stop reason: SDUPTHER

## 2024-08-27 RX ORDER — PROPOFOL 10 MG/ML
INJECTION, EMULSION INTRAVENOUS PRN
Status: DISCONTINUED | OUTPATIENT
Start: 2024-08-27 | End: 2024-08-27 | Stop reason: SDUPTHER

## 2024-08-27 RX ORDER — PROPOFOL 10 MG/ML
INJECTION, EMULSION INTRAVENOUS
Status: COMPLETED
Start: 2024-08-27 | End: 2024-08-27

## 2024-08-27 RX ORDER — FENTANYL CITRATE 50 UG/ML
INJECTION, SOLUTION INTRAMUSCULAR; INTRAVENOUS PRN
Status: DISCONTINUED | OUTPATIENT
Start: 2024-08-27 | End: 2024-08-27 | Stop reason: SDUPTHER

## 2024-08-27 RX ADMIN — MIDAZOLAM 2 MG: 1 INJECTION INTRAMUSCULAR; INTRAVENOUS at 07:06

## 2024-08-27 RX ADMIN — ONDANSETRON 4 MG: 2 INJECTION INTRAMUSCULAR; INTRAVENOUS at 07:35

## 2024-08-27 RX ADMIN — APREPITANT 40 MG: 40 CAPSULE ORAL at 07:00

## 2024-08-27 RX ADMIN — LIDOCAINE HYDROCHLORIDE 100 MG: 20 INJECTION, SOLUTION EPIDURAL; INFILTRATION; INTRACAUDAL; PERINEURAL at 07:37

## 2024-08-27 RX ADMIN — DEXAMETHASONE SODIUM PHOSPHATE 4 MG: 4 INJECTION, SOLUTION INTRAMUSCULAR; INTRAVENOUS at 07:35

## 2024-08-27 RX ADMIN — PROPOFOL 150 MCG/KG/MIN: 10 INJECTION, EMULSION INTRAVENOUS at 07:36

## 2024-08-27 RX ADMIN — CEFAZOLIN 2000 MG: 2 INJECTION, POWDER, FOR SOLUTION INTRAMUSCULAR; INTRAVENOUS at 07:41

## 2024-08-27 RX ADMIN — GLYCOPYRROLATE 0.1 MG: 0.2 INJECTION, SOLUTION INTRAMUSCULAR; INTRAVENOUS at 08:01

## 2024-08-27 RX ADMIN — SODIUM CHLORIDE, POTASSIUM CHLORIDE, SODIUM LACTATE AND CALCIUM CHLORIDE: 600; 310; 30; 20 INJECTION, SOLUTION INTRAVENOUS at 06:25

## 2024-08-27 RX ADMIN — FENTANYL CITRATE 25 MCG: 50 INJECTION INTRAMUSCULAR; INTRAVENOUS at 08:58

## 2024-08-27 RX ADMIN — PROPOFOL 30 MG: 10 INJECTION, EMULSION INTRAVENOUS at 07:38

## 2024-08-27 RX ADMIN — Medication 20 MG: at 10:10

## 2024-08-27 RX ADMIN — PROPOFOL 10 MG: 10 INJECTION, EMULSION INTRAVENOUS at 09:34

## 2024-08-27 RX ADMIN — EPHEDRINE SULFATE 5 MG: 50 INJECTION INTRAVENOUS at 08:16

## 2024-08-27 RX ADMIN — EPHEDRINE SULFATE 5 MG: 50 INJECTION INTRAVENOUS at 08:02

## 2024-08-27 RX ADMIN — FENTANYL CITRATE 25 MCG: 50 INJECTION INTRAMUSCULAR; INTRAVENOUS at 07:37

## 2024-08-27 RX ADMIN — EPHEDRINE SULFATE 5 MG: 50 INJECTION INTRAVENOUS at 08:37

## 2024-08-27 RX ADMIN — PROPOFOL 50 MG: 10 INJECTION, EMULSION INTRAVENOUS at 07:37

## 2024-08-27 ASSESSMENT — PAIN - FUNCTIONAL ASSESSMENT
PAIN_FUNCTIONAL_ASSESSMENT: 0-10
PAIN_FUNCTIONAL_ASSESSMENT: NONE - DENIES PAIN

## 2024-08-27 ASSESSMENT — PAIN DESCRIPTION - DESCRIPTORS: DESCRIPTORS: ACHING;SHARP

## 2024-08-27 NOTE — ANESTHESIA PROCEDURE NOTES
Peripheral Block    Patient location during procedure: pre-op  Reason for block: post-op pain management and at surgeon's request  Start time: 8/27/2024 7:06 AM  End time: 8/27/2024 7:09 AM  Staffing  Performed: anesthesiologist   Anesthesiologist: Silvino Palomares MD  Performed by: Silvino Palomares MD  Authorized by: Silvino Palomares MD    Preanesthetic Checklist  Completed: patient identified, IV checked, site marked, risks and benefits discussed, surgical/procedural consents, equipment checked, pre-op evaluation, timeout performed, anesthesia consent given, oxygen available and monitors applied/VS acknowledged  Peripheral Block   Patient position: sitting  Prep: ChloraPrep  Provider prep: mask and sterile gloves  Patient monitoring: cardiac monitor, continuous pulse ox, frequent blood pressure checks and IV access  Block type: Brachial plexus  Supraclavicular  Laterality: left  Injection technique: single-shot  Guidance: ultrasound guided  Local infiltration: lidocaine  Infiltration strength: 1 %  Local infiltration: lidocaine  Dose: 3 mL    Needle   Needle gauge: 21 G  Needle localization: ultrasound guidance  Needle length: 10 cm  Assessment   Injection assessment: negative aspiration for heme, no paresthesia on injection and local visualized surrounding nerve on ultrasound  Paresthesia pain: none  Slow fractionated injection: yes  Hemodynamics: stable  Outcomes: uncomplicated and patient tolerated procedure well    Additional Notes  Immediately prior to procedure a \"time out\" was called to verify the correct patient, allergies, laterality, procedure and equipment. Time out performed with  RN    Local Anesthetic: 0.5 %  Bupivacaine   Amount: 20 ml  in 5 ml increments after negative aspiration each time.    Anterior scalene and middle scalene muscles, 1st rib and pleura, brachial plexus (upper, middle and lower trunk) and Subclavian artery are identified, the tip of the needle and the spread of the local anesthetic

## 2024-08-27 NOTE — DISCHARGE INSTRUCTIONS
medicine will be less effective. It is better to treat pain in advance than to try and catch up.     3. General Anesthesia:   If you did not receive a nerve block during your surgery, you will need to start taking your pain medication shortly after your surgery and should continue to do so as prescribed by your surgeon.      ICE AND ELEVATION  You may use ice for the first 48-72 hours, but it is not critical.      2. Motion of your fingers is very important s to decrease the swelling. Please follow the finger range of motion exercises below to assist you in regaining your motion. This should be done at least 10 repetitions 3-4 times a day.     3. Elevation, as much as possible for the next 48 hours, is critical for decreasing swelling as well as for pain relief. Elevation means when you are seated or lying down, you hand should be at or above your heart. When walking, the hand needs to be at or above the level of your elbow.     4. If the bandage gets too tight, it may need to be loosened. Please contact our office and we will instruct you in how to do this.     SURGICAL BANDAGES:     Your bandages will be removed at your first postoperative visit.   You have a splint on your hand or arm after surgery. Please follow the instructions for splint care below.    SPLINT INSTRUCTIONS:    Do not stick anything inside your cast or splint to scratch.  Using a  or sharp object to scratch under a cast can be harmful and irritating to the skin.  This can cause an infection with long-term problems.  Never get your cast or splint wet.  If you have any type of incision, sores or pins under the cast this can lead to an infection.  Cover your splint with a plastic bag before showering and seal the bag with tape  Cast protectors for showering are available in our office as well as most medical supply [employer] and some pharmacies.  3. If your cast or splint gets wet call our office.  4. Casts are never completely comfortable  Use only medicines prescribed by your doctor.    ? Do not drink alcohol.  ? If you have a dental device to assist you while at rest, use it at all times for the first 24 hours.  For patients using CPAP machines:  ? Use your CPAP machine during all periods of sleep as usual.  ? Use your CPAP machine during all periods of daytime rest while on pain medicines.  ** Follow up with your primary care doctor for continued care.    IF YOU DO NOT TAKE ALL OF YOUR NARCOTIC PAIN MEDICATION, please dispose of them responsibly. There are drop off boxes in the Emergency Departments 24/7 at both [facility] and [facility]. If these locations are not convenient, other options for discarding them can be found at:  [email or web address]    Hospital or office staff may NOT accept any medications to drop off in the cabinet for you.  PERIPHERAL NERVE BLOCK INSTRUCTIONS     Please remember while having a nerve block you are at an increased risk for BALANCE ISSUES AND FALLS!!  You were given a nerve block today from the anesthesiologist. Most nerve blocks last anywhere from 6-36 hours.  You should start taking your pain medication before the block wears off or when you first begin feeling discomfort. It takes at least 30-60 minutes for a pain pill to take effect. Pain medications should be taken with food.   Consider setting an alarm through the night to help manage your pain level so you do not wake up with too much pain.   Pain medicines can cause more sedation and decrease your breathing so ONLY take as directed. If you have Sleep Apnea, you definitely need to use your C-Pap machine.   What to expect after a nerve block:   Numbness, tingling- arm or leg feels heavy or asleep  Weakness or inability to move or control your arm or leg   Inability to feel temperature changes to your arm or leg  Usually the weakness wears off first, followed by the tingling or heaviness. You may notice more pain at this point and should start taking

## 2024-08-27 NOTE — H&P
Hand, Upper Extremity and Reconstructive Surgery                Fatuma Gill MD                                           Summary of Upper Extremity Problems and Interventions     Diagnosis: Left thumb pain and left carpal tunnel syndrome    History of Present Illness     Leatah Campos is a 54 y.o. right hand dominant female who presents for surgical treatment of her left thumb CMC arthritis and left carpal tunnel syndrome.  She has had no changes in her medical history since she was last seen.  She has been in good health.  She has been taking her hypertension medications.  She is not a smoker.    Review of Systems - Negative except left hand pain     Allergies     Allergies   Allergen Reactions    Cymbalta [Duloxetine Hcl] Nausea Only    Albuterol Palpitations       Home Medications     Current Outpatient Medications   Medication Instructions    ADVAIR DISKUS 500-50 MCG/ACT AEPB diskus inhaler INHALE 1 DOSE BY MOUTH IN THE MORNING AND 1 IN THE EVENING RINSE MOUTH AFTER USE    albuterol (PROVENTIL) 2.5 mg, Nebulization, EVERY 4 HOURS PRN    dexAMETHasone (DECADRON) 1 MG tablet Take 1 tablet at 11 PM night before labs    levalbuterol (XOPENEX HFA) 45 MCG/ACT inhaler 1 puff, Inhalation, EVERY 4 HOURS PRN    losartan (COZAAR) 25 mg, Oral, DAILY    meloxicam (MOBIC) 15 mg, Oral, DAILY    pantoprazole (PROTONIX) 40 mg, Oral, 2 times daily    pregabalin (LYRICA) 25 mg, Oral, 3 TIMES DAILY    sucralfate (CARAFATE) 1 g, Oral, 4 TIMES DAILY    tiotropium (SPIRIVA RESPIMAT) 2.5 MCG/ACT AERS inhaler 2 puffs, Inhalation, DAILY RESP    triamterene-hydroCHLOROthiazide (DYAZIDE) 37.5-25 MG per capsule TAKE 1 CAPSULE BY MOUTH ONCE DAILY FOR BLOOD PRESSURE    zolpidem (AMBIEN) 5 mg, Oral, NIGHTLY PRN       Past Medical History     Past Medical History:   Diagnosis Date    Anxiety     Chronic back pain     Cyst (solitary) of breast     RIGHT BREAST    Depression     Hypertension        Past  15 degrees of MP hyperextension     Reduced sensation light touch in the thumb and index finger, positive compression test over the median nerve carpal tunnel, negative Tinel's over the median nerve at the carpal tunnel, 6mm two point discrimination in all digits     Patient has full digital flexion and extension, wrist flexion extension and prosupination are within normal limits     All fingertips are pink with good capillary refill and of normal temperature.  No edema.  No areas of ischemia       Radiographs & Imaging     3 view X-rays of the left hand dated 12/6/2023 were reviewed independently and discussed with the patient.  The films revealed: Thumb CMC joint space narrowing and osteophyte deposition consistent with osteoarthritis, subchondral cyst present in the distal scaphoid and in the index metacarpal base    EMG left upper extremity performed by Claritza Sauceda on 3/21/2024:  Impression moderate left carpal tunnel syndrome: Sensory latency 3.3 ms, motor latency 4.3 ms, no changes on needle exam    Other Diagnostic Studies and Scales     None      Assessment and plan     Leatha Campos is a 54 y.o. female with left thumb CMC arthritis and moderate left carpal tunnel syndrome, presenting today for left trapeziectomy and suspensioplasty and left carpal tunnel release.  Plan for surgery and risks of surgery were previously discussed.  All questions were answered and patient agrees with the plan.

## 2024-08-27 NOTE — ANESTHESIA POSTPROCEDURE EVALUATION
Department of Anesthesiology  Postprocedure Note    Patient: Leatha Campos  MRN: [id number]  YOB: 1970  Date of evaluation: 8/27/2024    Procedure Summary       Date: 08/27/24 Room / Location: [address]    Anesthesia Start: 0729 Anesthesia Stop: 0946    Procedure: LEFT THUMB TRAPEZIECTOMY, LIGAMENT RECONSTRUCTION AND TENDON INTERPOSITION, LEFT CARPAL TUNNEL RELEASE (Left: Hand) Diagnosis:       Primary osteoarthritis of first carpometacarpal joint of left hand      Carpal tunnel syndrome on left      (Primary osteoarthritis of first carpometacarpal joint of left hand [M18.12])      (Carpal tunnel syndrome on left [G56.02])    Surgeons: Fatuma Gill MD Responsible Provider: Silvino Palomares MD    Anesthesia Type: general ASA Status: 3            Anesthesia Type: No value filed.    Carlos Phase I: Carlos Score: 10    Carlos Phase II: Carlos Score: 10    Anesthesia Post Evaluation    Comments: Postoperative Anesthesia Note    Name:    Leatha Campos  MRN:      [id number]    Patient Vitals in the past 12 hrs:  08/27/24 1020, BP:(!) 155/78, Temp:97.8 °F (36.6 °C), Temp src:Temporal, Pulse:82, Resp:13, SpO2:99 %  08/27/24 1001, BP:(!) 145/74, Pulse:79, Resp:12, SpO2:100 %  08/27/24 0953, BP:(!) 162/78, Pulse:84, Resp:16, SpO2:95 %  08/27/24 0947, BP:(!) 147/92, Pulse:79, Resp:16, SpO2:100 %  08/27/24 0942, BP:(!) 148/90, Temp:97.6 °F (36.4 °C), Pulse:79, Resp:16, SpO2:100 %  08/27/24 0718, BP:(!) 147/89, Pulse:73, Resp:17, SpO2:96 %  08/27/24 0713, BP:(!) 147/91, Pulse:72, Resp:18, SpO2:99 %  08/27/24 0708, BP:(!) 172/88, Pulse:83, Resp:12, SpO2:100 %  08/27/24 0604, BP:131/88, Temp:97.3 °F (36.3 °C), Temp src:Temporal, Pulse:60, Resp:16, SpO2:98 %, Height:1.499 m (4' 11\"), Weight:97.1 kg (214 lb)     LABS:    CBC  Lab Results       Component                Value               Date/Time                  WBC                      8.1                 10/19/2023 08:26 AM

## 2024-09-11 ENCOUNTER — OFFICE VISIT (OUTPATIENT)
Dept: ORTHOPEDIC SURGERY | Age: 54
End: 2024-09-11

## 2024-09-11 ENCOUNTER — HOSPITAL ENCOUNTER (OUTPATIENT)
Dept: OCCUPATIONAL THERAPY | Age: 54
Setting detail: THERAPIES SERIES
Discharge: HOME | End: 2024-09-11
Payer: MEDICAID

## 2024-09-11 VITALS — WEIGHT: 214 LBS | BODY MASS INDEX: 43.14 KG/M2 | HEIGHT: 59 IN

## 2024-09-11 DIAGNOSIS — M79.645 THUMB PAIN, LEFT: Primary | ICD-10-CM

## 2024-09-11 PROCEDURE — L3808 WHFO, RIGID W/O JOINTS: HCPCS | Performed by: OCCUPATIONAL THERAPIST

## 2024-09-11 PROCEDURE — 99024 POSTOP FOLLOW-UP VISIT: CPT | Performed by: STUDENT IN AN ORGANIZED HEALTH CARE EDUCATION/TRAINING PROGRAM

## 2024-09-17 DIAGNOSIS — J45.909 UNCOMPLICATED ASTHMA, UNSPECIFIED ASTHMA SEVERITY, UNSPECIFIED WHETHER PERSISTENT: ICD-10-CM

## 2024-09-17 RX ORDER — FLUTICASONE PROPIONATE AND SALMETEROL 50; 500 UG/1; UG/1
POWDER RESPIRATORY (INHALATION)
Qty: 60 EACH | Refills: 0 | OUTPATIENT
Start: 2024-09-17

## 2024-09-24 ENCOUNTER — HOSPITAL ENCOUNTER (OUTPATIENT)
Dept: OCCUPATIONAL THERAPY | Age: 54
Setting detail: THERAPIES SERIES
Discharge: HOME | End: 2024-09-24
Payer: MEDICAID

## 2024-09-24 PROCEDURE — 97112 NEUROMUSCULAR REEDUCATION: CPT | Performed by: OCCUPATIONAL THERAPIST

## 2024-09-24 PROCEDURE — 97014 ELECTRIC STIMULATION THERAPY: CPT | Performed by: OCCUPATIONAL THERAPIST

## 2024-09-24 PROCEDURE — 97166 OT EVAL MOD COMPLEX 45 MIN: CPT | Performed by: OCCUPATIONAL THERAPIST

## 2024-09-25 RX ORDER — ONDANSETRON 4 MG/1
4 TABLET, ORALLY DISINTEGRATING ORAL 3 TIMES DAILY PRN
Qty: 21 TABLET | Refills: 0 | Status: SHIPPED | OUTPATIENT
Start: 2024-09-25

## 2024-10-01 ENCOUNTER — HOSPITAL ENCOUNTER (OUTPATIENT)
Dept: OCCUPATIONAL THERAPY | Age: 54
Setting detail: THERAPIES SERIES
End: 2024-10-01
Payer: COMMERCIAL

## 2024-10-08 ENCOUNTER — APPOINTMENT (OUTPATIENT)
Dept: OCCUPATIONAL THERAPY | Age: 54
End: 2024-10-08
Payer: COMMERCIAL

## 2024-10-08 ENCOUNTER — HOSPITAL ENCOUNTER (OUTPATIENT)
Age: 54
Discharge: HOME | End: 2024-10-08
Payer: MEDICAID

## 2024-10-08 DIAGNOSIS — G47.33 SEVERE OBSTRUCTIVE SLEEP APNEA: ICD-10-CM

## 2024-10-08 DIAGNOSIS — Z78.9 INTOLERANCE OF CONTINUOUS POSITIVE AIRWAY PRESSURE (CPAP) VENTILATION: ICD-10-CM

## 2024-10-08 DIAGNOSIS — M18.12 ARTHRITIS OF CARPOMETACARPAL (CMC) JOINT OF LEFT THUMB: Primary | ICD-10-CM

## 2024-10-08 DIAGNOSIS — I10 ESSENTIAL HYPERTENSION: ICD-10-CM

## 2024-10-08 LAB
ALBUMIN SERPL-MCNC: 4.2 G/DL (ref 3.4–5)
ALBUMIN/GLOB SERPL: 1.4 {RATIO} (ref 1.1–2.2)
ALP SERPL-CCNC: 127 U/L (ref 40–129)
ALT SERPL-CCNC: 29 U/L (ref 10–40)
ANION GAP SERPL CALCULATED.3IONS-SCNC: 11 MMOL/L (ref 3–16)
AST SERPL-CCNC: 35 U/L (ref 15–37)
BILIRUB SERPL-MCNC: <0.2 MG/DL (ref 0–1)
BUN SERPL-MCNC: 11 MG/DL (ref 7–20)
CALCIUM SERPL-MCNC: 9.6 MG/DL (ref 8.3–10.6)
CHLORIDE SERPL-SCNC: 103 MMOL/L (ref 99–110)
CO2 SERPL-SCNC: 23 MMOL/L (ref 21–32)
CORTIS SERPL-MCNC: 6 UG/DL
CREAT SERPL-MCNC: 0.7 MG/DL (ref 0.6–1.1)
FSH SERPL-ACNC: 69.9 MIU/ML
GFR SERPLBLD CREATININE-BSD FMLA CKD-EPI: >90 ML/MIN/{1.73_M2}
GLUCOSE SERPL-MCNC: 92 MG/DL (ref 70–99)
LH SERPL-ACNC: 34.5 MIU/ML
PHOSPHATE SERPL-MCNC: 3.1 MG/DL (ref 2.5–4.9)
POTASSIUM SERPL-SCNC: 4.1 MMOL/L (ref 3.5–5.1)
SODIUM SERPL-SCNC: 137 MMOL/L (ref 136–145)
T3 SERPL-MCNC: 1.37 NG/ML (ref 0.8–2)
T4 FREE SERPL-MCNC: 1.2 NG/DL (ref 0.9–1.8)
TSH SERPL DL<=0.005 MIU/L-ACNC: 1.31 UIU/ML (ref 0.27–4.2)

## 2024-10-08 PROCEDURE — 84480 ASSAY TRIIODOTHYRONINE (T3): CPT

## 2024-10-08 PROCEDURE — 83002 ASSAY OF GONADOTROPIN (LH): CPT

## 2024-10-08 PROCEDURE — 84155 ASSAY OF PROTEIN SERUM: CPT

## 2024-10-08 PROCEDURE — 84403 ASSAY OF TOTAL TESTOSTERONE: CPT

## 2024-10-08 PROCEDURE — 82627 DEHYDROEPIANDROSTERONE: CPT

## 2024-10-08 PROCEDURE — 84100 ASSAY OF PHOSPHORUS: CPT

## 2024-10-08 PROCEDURE — 82024 ASSAY OF ACTH: CPT

## 2024-10-08 PROCEDURE — 84305 ASSAY OF SOMATOMEDIN: CPT

## 2024-10-08 PROCEDURE — 83001 ASSAY OF GONADOTROPIN (FSH): CPT

## 2024-10-08 PROCEDURE — 84443 ASSAY THYROID STIM HORMONE: CPT

## 2024-10-08 PROCEDURE — 84165 PROTEIN E-PHORESIS SERUM: CPT

## 2024-10-08 PROCEDURE — 82533 TOTAL CORTISOL: CPT

## 2024-10-08 PROCEDURE — 80053 COMPREHEN METABOLIC PANEL: CPT

## 2024-10-08 PROCEDURE — 84439 ASSAY OF FREE THYROXINE: CPT

## 2024-10-09 LAB
ALBUMIN SERPL ELPH-MCNC: 3.3 G/DL (ref 3.1–4.9)
ALPHA1 GLOB SERPL ELPH-MCNC: 0.3 G/DL (ref 0.2–0.4)
ALPHA2 GLOB SERPL ELPH-MCNC: 0.8 G/DL (ref 0.4–1.1)
B-GLOBULIN SERPL ELPH-MCNC: 1.4 G/DL (ref 0.9–1.6)
GAMMA GLOB SERPL ELPH-MCNC: 1.5 G/DL (ref 0.6–1.8)
PROT SERPL-MCNC: 7.3 G/DL (ref 6.4–8.2)
SPE/IFE INTERPRETATION: NORMAL

## 2024-10-09 RX ORDER — MELOXICAM 15 MG/1
15 TABLET ORAL DAILY
Qty: 30 TABLET | Refills: 0 | Status: SHIPPED | OUTPATIENT
Start: 2024-10-09

## 2024-10-09 RX ORDER — ZOLPIDEM TARTRATE 5 MG/1
TABLET ORAL
Qty: 30 TABLET | Refills: 0 | Status: SHIPPED | OUTPATIENT
Start: 2024-10-09 | End: 2024-11-08

## 2024-10-09 RX ORDER — TRIAMTERENE AND HYDROCHLOROTHIAZIDE 37.5; 25 MG/1; MG/1
CAPSULE ORAL
Qty: 30 CAPSULE | Refills: 0 | Status: SHIPPED | OUTPATIENT
Start: 2024-10-09

## 2024-10-10 ENCOUNTER — HOSPITAL ENCOUNTER (OUTPATIENT)
Age: 54
Setting detail: SPECIMEN
Discharge: HOME | End: 2024-10-10
Payer: MEDICAID

## 2024-10-10 LAB
CREAT 24H UR-MRATE: 0.9 G/24HR (ref 0.6–1.5)
IGF-I SERPL-MCNC: 99 NG/ML (ref 52–233)
IGF-I Z-SCORE SERPL: -0.5
SPECIMEN VOL 24H UR: ABNORMAL ML

## 2024-10-10 PROCEDURE — 82570 ASSAY OF URINE CREATININE: CPT

## 2024-10-10 PROCEDURE — 82533 TOTAL CORTISOL: CPT

## 2024-10-10 PROCEDURE — 82530 CORTISOL FREE: CPT

## 2024-10-11 ENCOUNTER — APPOINTMENT (OUTPATIENT)
Dept: OCCUPATIONAL THERAPY | Age: 54
End: 2024-10-11
Payer: COMMERCIAL

## 2024-10-11 ENCOUNTER — HOSPITAL ENCOUNTER (OUTPATIENT)
Age: 54
Setting detail: SPECIMEN
Discharge: HOME | End: 2024-10-11
Payer: MEDICAID

## 2024-10-11 LAB
ACTH PLAS-MCNC: 8 PG/ML (ref 7–63)
DHEA-S SERPL-MCNC: 39 UG/DL (ref 35.4–256)
TESTOST SERPL-MCNC: 15 NG/DL (ref 3–41)

## 2024-10-11 PROCEDURE — 82533 TOTAL CORTISOL: CPT

## 2024-10-13 LAB
COLLECT DURATION TIME SPEC: NORMAL H
CORTIS F 24H UR HPLC-MCNC: 24.7 UG/L
CORTIS F 24H UR-MRATE: 14.8 UG/D
CORTIS F/CREAT 24H UR: 16.47 UG/G CRT
CREAT 24H UR-MCNC: 150 MG/DL
CREAT 24H UR-MRATE: 900 MG/D (ref 500–1400)
IMP & REVIEW OF LAB RESULTS: NORMAL
SPECIMEN VOL ?TM UR: NORMAL ML

## 2024-10-14 ENCOUNTER — OFFICE VISIT (OUTPATIENT)
Dept: ORTHOPEDIC SURGERY | Age: 54
End: 2024-10-14

## 2024-10-14 ENCOUNTER — HOSPITAL ENCOUNTER (OUTPATIENT)
Dept: OCCUPATIONAL THERAPY | Age: 54
Setting detail: THERAPIES SERIES
Discharge: HOME | End: 2024-10-14
Payer: COMMERCIAL

## 2024-10-14 VITALS — BODY MASS INDEX: 43.14 KG/M2 | WEIGHT: 214 LBS | HEIGHT: 59 IN

## 2024-10-14 DIAGNOSIS — M18.12 ARTHRITIS OF CARPOMETACARPAL (CMC) JOINT OF LEFT THUMB: Primary | ICD-10-CM

## 2024-10-14 PROCEDURE — 97018 PARAFFIN BATH THERAPY: CPT | Performed by: OCCUPATIONAL THERAPIST

## 2024-10-14 PROCEDURE — 99024 POSTOP FOLLOW-UP VISIT: CPT | Performed by: STUDENT IN AN ORGANIZED HEALTH CARE EDUCATION/TRAINING PROGRAM

## 2024-10-14 PROCEDURE — 97140 MANUAL THERAPY 1/> REGIONS: CPT | Performed by: OCCUPATIONAL THERAPIST

## 2024-10-14 PROCEDURE — 97110 THERAPEUTIC EXERCISES: CPT | Performed by: OCCUPATIONAL THERAPIST

## 2024-10-14 PROCEDURE — 97112 NEUROMUSCULAR REEDUCATION: CPT | Performed by: OCCUPATIONAL THERAPIST

## 2024-10-14 NOTE — PROGRESS NOTES
Hand, Upper Extremity and Reconstructive Surgery                Fatuma Gill MD                                           Summary of Upper Extremity Problems and Interventions     Diagnosis: Left thumb pain and left carpal tunnel syndrome  Surgery-left trapeziectomy and suspension plasty using Arthrex fiber lock and left carpal tunnel release, 8/27/2024    History of Present Illness     Interval update 10/14/2024-patient presents for follow-up of above surgery.  She is currently 7 weeks postoperatively.  She is working with therapy.  Her pain is been relatively well-controlled.  She has been doing her and hand exercises regularly.    Leatha Campos is a 54 y.o. right hand dominant female who presents for follow-up of left thumb pain.  Patient had a steroid injection on 12/6/2023.  She had improvement of her symptoms for 3 to 4 weeks.  However since that time she has had significant left thumb pain.  She states she can barely pick anything up.  She has 7/10 pain constantly.  Occasionally gets worse when she hits it on things.  The pain wakes her up at night.  She is unable to hold or do most things with her left hand.  She does not do any kind of heavy work mostly household items.  She cares for her 6-year-old granddaughter.  She does also report some numbness and tingling in her thumb and index finger.    Allergies     Allergies   Allergen Reactions    Cymbalta [Duloxetine Hcl] Nausea Only    Albuterol Palpitations       Home Medications     Current Outpatient Medications   Medication Instructions    ADVAIR DISKUS 500-50 MCG/ACT AEPB diskus inhaler INHALE 1 DOSE BY MOUTH IN THE MORNING AND 1 IN THE EVENING RINSE MOUTH AFTER USE    albuterol (PROVENTIL) 2.5 mg, Nebulization, EVERY 4 HOURS PRN    dexAMETHasone (DECADRON) 1 MG tablet Take 1 tablet at 11 PM night before labs    levalbuterol (XOPENEX HFA) 45 MCG/ACT inhaler 1 puff, Inhalation, EVERY 4 HOURS PRN    losartan (COZAAR)

## 2024-10-14 NOTE — PLAN OF CARE
[facility] [address], [place] [address] office: [phone] fax: [phone]     Occupational Therapy Initial Evaluation Certification      Dear Fatuma Gill MD,    We had the pleasure of evaluating the following patient for occupational therapy services at [facility].  A summary of our findings can be found in the initial assessment below.  This includes our plan of care.  If you have any questions or concerns regarding these findings, please do not hesitate to contact me at the office phone number listed above.  Thank you for the referral.     Physician Signature:_______________________________Date:__________________  By signing above (or electronic signature), therapist’s plan is approved by physician       Occupational Therapy: TREATMENT/PROGRESS NOTE   Patient: Leatha Campos (54 y.o. female)   Examination Date: 10/14/2024   :  1970 MRN: [id number]   Visit #: Visit count could not be calculated. Make sure you are using a visit which is associated with an episode.  Insurance Allowable Auth Needed   3/30 []Yes    [x]No    Insurance: Payor: [employer] MEDICAID / Plan: [facility] [facility] MEDICAID / Product Type: *No Product type* /   Insurance ID: [id number] - (Medicaid Managed)  Secondary Insurance (if applicable):    Treatment Diagnosis:   Chronic thumb pain, right [M79.644, G89.29]   Medical Diagnosis:  After care [Z51.89]  Chronic thumb pain, right [M79.644, G89.29]  Status post carpal tunnel release [Z98.890]   Referring Physician: Fatuma Gill MD  PCP: Tori Barrios APRN - CNP     DOI:    DOS:24    Plan of care signed (Y/N):     Date of Patient follow up with Physician:      Roberto Carlos Date: 24  Progress Report/POC: EVAL today  POC update due: (10 visits /OR AUTH LIMITS, whichever is less)  2024                                             Precautions/

## 2024-10-15 ENCOUNTER — APPOINTMENT (OUTPATIENT)
Dept: OCCUPATIONAL THERAPY | Age: 54
End: 2024-10-15
Payer: COMMERCIAL

## 2024-10-15 LAB — CORTIS SAL-MCNC: 0.07 UG/DL

## 2024-10-16 LAB — CORTIS SAL-MCNC: 0.06 UG/DL

## 2024-12-12 DIAGNOSIS — G47.33 SEVERE OBSTRUCTIVE SLEEP APNEA: ICD-10-CM

## 2024-12-12 DIAGNOSIS — Z78.9 INTOLERANCE OF CONTINUOUS POSITIVE AIRWAY PRESSURE (CPAP) VENTILATION: ICD-10-CM

## 2024-12-12 DIAGNOSIS — M18.12 ARTHRITIS OF CARPOMETACARPAL (CMC) JOINT OF LEFT THUMB: ICD-10-CM

## 2024-12-13 RX ORDER — ZOLPIDEM TARTRATE 5 MG/1
TABLET ORAL
Qty: 30 TABLET | Refills: 0 | OUTPATIENT
Start: 2024-12-13 | End: 2025-01-12

## 2024-12-13 RX ORDER — MELOXICAM 15 MG/1
15 TABLET ORAL DAILY
Qty: 30 TABLET | Refills: 0 | Status: SHIPPED | OUTPATIENT
Start: 2024-12-13

## 2024-12-16 NOTE — TELEPHONE ENCOUNTER
Attempted to call patient and while leaving  the call was dropped. Patient cancelled appt on 12/10/24 due to transportation. She is aware she needs to be seen for meds.

## 2025-01-09 DIAGNOSIS — M18.12 ARTHRITIS OF CARPOMETACARPAL (CMC) JOINT OF LEFT THUMB: ICD-10-CM

## 2025-01-09 RX ORDER — MELOXICAM 15 MG/1
15 TABLET ORAL DAILY
Qty: 30 TABLET | Refills: 0 | Status: SHIPPED | OUTPATIENT
Start: 2025-01-09

## 2025-02-03 DIAGNOSIS — M18.12 ARTHRITIS OF CARPOMETACARPAL (CMC) JOINT OF LEFT THUMB: ICD-10-CM

## 2025-02-03 DIAGNOSIS — I10 ESSENTIAL HYPERTENSION: ICD-10-CM

## 2025-02-03 RX ORDER — TRIAMTERENE AND HYDROCHLOROTHIAZIDE 37.5; 25 MG/1; MG/1
CAPSULE ORAL
Qty: 30 CAPSULE | Refills: 0 | Status: SHIPPED | OUTPATIENT
Start: 2025-02-03

## 2025-02-03 RX ORDER — MELOXICAM 15 MG/1
15 TABLET ORAL DAILY
Qty: 30 TABLET | Refills: 0 | Status: SHIPPED | OUTPATIENT
Start: 2025-02-03

## 2025-02-05 ENCOUNTER — TELEPHONE (OUTPATIENT)
Dept: FAMILY MEDICINE CLINIC | Age: 55
End: 2025-02-05

## 2025-02-11 ENCOUNTER — TELEPHONE (OUTPATIENT)
Dept: PULMONOLOGY | Age: 55
End: 2025-02-11

## 2025-02-11 NOTE — TELEPHONE ENCOUNTER
Spoke with pt offering to r/s.  Pt states that [facility] is [facility] right now and will need to call back to r/s.

## 2025-02-11 NOTE — TELEPHONE ENCOUNTER
Patient did not show for 7-8 week med/Insomnia/BREANN appointment  with Miranda Syed on 2/11/25    Same Day Cancellation: No    Patient rescheduled:  No

## 2025-02-18 NOTE — PROGRESS NOTES
Blood drawn per order. Needle size: 23 g butterfly   Site: L Antecubital.  First attempt successful Yes    Second attempt na    Pressure applied until bleeding stopped. Cotton ball and band aid  applied. Patient informed to call office or return if bleeding reoccurs and unable to stop.     Tubes drawn: 1 purple     1 red    4 white top tubes with urine No Vaccines Administered.

## 2025-02-20 NOTE — TELEPHONE ENCOUNTER
Spoke with pt whom states that she has still been in the ICU with her granddaughter at [facility] and should know more in the coming days as pt gets moved out of the ICU.  Will follow up with pt in approx 2 weeks.

## 2025-03-11 NOTE — TELEPHONE ENCOUNTER
Spoke with pt whom states that she just got her granddaughter home from Hospital on Friday and needs another week or 2.  Pt also hasn't been using PAP due to being in the hospital.

## 2025-03-13 DIAGNOSIS — M54.41 CHRONIC BILATERAL LOW BACK PAIN WITH BILATERAL SCIATICA: ICD-10-CM

## 2025-03-13 DIAGNOSIS — G89.29 CHRONIC BILATERAL LOW BACK PAIN WITH BILATERAL SCIATICA: ICD-10-CM

## 2025-03-13 DIAGNOSIS — M54.42 CHRONIC BILATERAL LOW BACK PAIN WITH BILATERAL SCIATICA: ICD-10-CM

## 2025-03-13 RX ORDER — PREGABALIN 25 MG/1
CAPSULE ORAL
Qty: 90 CAPSULE | Refills: 2 | Status: SHIPPED | OUTPATIENT
Start: 2025-03-13 | End: 2025-04-12

## 2025-03-13 RX ORDER — ONDANSETRON 4 MG/1
TABLET, ORALLY DISINTEGRATING ORAL
Qty: 21 TABLET | Refills: 0 | Status: SHIPPED | OUTPATIENT
Start: 2025-03-13

## 2025-07-24 DIAGNOSIS — M18.12 ARTHRITIS OF CARPOMETACARPAL (CMC) JOINT OF LEFT THUMB: ICD-10-CM

## 2025-07-24 DIAGNOSIS — J45.909 UNCOMPLICATED ASTHMA, UNSPECIFIED ASTHMA SEVERITY, UNSPECIFIED WHETHER PERSISTENT: ICD-10-CM

## 2025-07-24 RX ORDER — FLUTICASONE PROPIONATE AND SALMETEROL 50; 500 UG/1; UG/1
POWDER RESPIRATORY (INHALATION)
Qty: 60 EACH | Refills: 0 | Status: SHIPPED | OUTPATIENT
Start: 2025-07-24

## 2025-07-24 RX ORDER — ALBUTEROL SULFATE 0.83 MG/ML
2.5 SOLUTION RESPIRATORY (INHALATION) EVERY 4 HOURS PRN
Qty: 1080 ML | Refills: 1 | Status: SHIPPED | OUTPATIENT
Start: 2025-07-24 | End: 2026-07-24

## 2025-07-25 RX ORDER — MELOXICAM 15 MG/1
15 TABLET ORAL DAILY
Qty: 30 TABLET | Refills: 0 | OUTPATIENT
Start: 2025-07-25

## 2025-07-25 NOTE — TELEPHONE ENCOUNTER
LM informing her that PCP needs to start filling these for her and that she should notify her pharmacy.  Miranda will not fill these again.

## (undated) DEVICE — SPLINT ORTH W4XL15IN LAYERED FBRGLS FOAM PD BRTH BK MOLD

## (undated) DEVICE — STERILE LATEX POWDER-FREE SURGICAL GLOVESWITH NITRILE COATING: Brand: PROTEXIS

## (undated) DEVICE — BANDAGE COMPR W2INXL5.5YD BGE POLY COT BLEND YARN HNY STRP

## (undated) DEVICE — COTTON UNDERCAST PADDING,CRIMPED FINISH: Brand: WEBRIL

## (undated) DEVICE — CURITY ABDOMINAL PAD: Brand: CURITY

## (undated) DEVICE — Device

## (undated) DEVICE — SUTURE MONOCRYL SZ 4-0 L18IN ABSRB UD L19MM PS-2 3/8 CIR PRIM Y496G

## (undated) DEVICE — TOOL TRAPEZIECTOMY CRPL W HNDL FOR BNE EXCISION

## (undated) DEVICE — 3M™ COBAN™ NL STERILE NON-LATEX SELF-ADHERENT WRAP, 2084S, 4 IN X 5 YD (10 CM X 4,5 M), 18 ROLLS/CASE: Brand: 3M™ COBAN™

## (undated) DEVICE — ROYAL SILK SURGICAL GOWN, XL: Brand: CONVERTORS

## (undated) DEVICE — 3M™ IOBAN™ 2 ANTIMICROBIAL INCISE DRAPE 6640EZ: Brand: IOBAN™ 2

## (undated) DEVICE — SUTURE VICRYL SZ 3-0 L18IN ABSRB UD PS-2 L19MM 1/2 CIR J497G

## (undated) DEVICE — DRAPE 33X23IN INCISE ANTIMICROB IOBAN 2

## (undated) DEVICE — Z CONVERTED USE 2273164 BANDAGE COMPR W4INXL4 1/2YD E EC SGL LAYERED CLP CLSR ECONO

## (undated) DEVICE — DRESSING,GAUZE,XEROFORM,CURAD,1"X8",ST: Brand: CURAD

## (undated) DEVICE — SOLUTION,SALINE,IRRGATION,500ML,STRL: Brand: MEDLINE

## (undated) DEVICE — SUTURE VICRYL COAT SZ 4-0 L18IN ABSRB UD L19MM PS-2 1/2 CIR J496G

## (undated) DEVICE — COTTON UNDERCAST PADDING,REGULAR FINISH: Brand: WEBRIL

## (undated) DEVICE — SUTURE N ABSRB L 18 IN SZ 4-0 NDL L 19 MM NYL MONOFILAMENT